# Patient Record
Sex: FEMALE | Race: WHITE
[De-identification: names, ages, dates, MRNs, and addresses within clinical notes are randomized per-mention and may not be internally consistent; named-entity substitution may affect disease eponyms.]

---

## 2020-08-05 ENCOUNTER — HOSPITAL ENCOUNTER (INPATIENT)
Dept: HOSPITAL 61 - 1 WEST ICU | Age: 70
LOS: 14 days | Discharge: HOME HEALTH | DRG: 177 | End: 2020-08-19
Attending: INTERNAL MEDICINE | Admitting: INTERNAL MEDICINE
Payer: COMMERCIAL

## 2020-08-05 VITALS — DIASTOLIC BLOOD PRESSURE: 53 MMHG | SYSTOLIC BLOOD PRESSURE: 90 MMHG

## 2020-08-05 VITALS — DIASTOLIC BLOOD PRESSURE: 51 MMHG | SYSTOLIC BLOOD PRESSURE: 109 MMHG

## 2020-08-05 VITALS — DIASTOLIC BLOOD PRESSURE: 51 MMHG | SYSTOLIC BLOOD PRESSURE: 98 MMHG

## 2020-08-05 VITALS — DIASTOLIC BLOOD PRESSURE: 52 MMHG | SYSTOLIC BLOOD PRESSURE: 100 MMHG

## 2020-08-05 VITALS — DIASTOLIC BLOOD PRESSURE: 56 MMHG | SYSTOLIC BLOOD PRESSURE: 93 MMHG

## 2020-08-05 VITALS — DIASTOLIC BLOOD PRESSURE: 55 MMHG | SYSTOLIC BLOOD PRESSURE: 107 MMHG

## 2020-08-05 VITALS — DIASTOLIC BLOOD PRESSURE: 67 MMHG | SYSTOLIC BLOOD PRESSURE: 93 MMHG

## 2020-08-05 VITALS — SYSTOLIC BLOOD PRESSURE: 102 MMHG | DIASTOLIC BLOOD PRESSURE: 50 MMHG

## 2020-08-05 VITALS — SYSTOLIC BLOOD PRESSURE: 111 MMHG | DIASTOLIC BLOOD PRESSURE: 59 MMHG

## 2020-08-05 VITALS — DIASTOLIC BLOOD PRESSURE: 54 MMHG | SYSTOLIC BLOOD PRESSURE: 100 MMHG

## 2020-08-05 VITALS — DIASTOLIC BLOOD PRESSURE: 56 MMHG | SYSTOLIC BLOOD PRESSURE: 107 MMHG

## 2020-08-05 VITALS — DIASTOLIC BLOOD PRESSURE: 57 MMHG | SYSTOLIC BLOOD PRESSURE: 109 MMHG

## 2020-08-05 VITALS — SYSTOLIC BLOOD PRESSURE: 84 MMHG | DIASTOLIC BLOOD PRESSURE: 56 MMHG

## 2020-08-05 VITALS — DIASTOLIC BLOOD PRESSURE: 54 MMHG | SYSTOLIC BLOOD PRESSURE: 106 MMHG

## 2020-08-05 VITALS — SYSTOLIC BLOOD PRESSURE: 106 MMHG | DIASTOLIC BLOOD PRESSURE: 50 MMHG

## 2020-08-05 VITALS — DIASTOLIC BLOOD PRESSURE: 57 MMHG | SYSTOLIC BLOOD PRESSURE: 114 MMHG

## 2020-08-05 VITALS — DIASTOLIC BLOOD PRESSURE: 52 MMHG | SYSTOLIC BLOOD PRESSURE: 103 MMHG

## 2020-08-05 VITALS — WEIGHT: 199.74 LBS | BODY MASS INDEX: 32.1 KG/M2 | HEIGHT: 66 IN

## 2020-08-05 VITALS — DIASTOLIC BLOOD PRESSURE: 47 MMHG | SYSTOLIC BLOOD PRESSURE: 97 MMHG

## 2020-08-05 VITALS — DIASTOLIC BLOOD PRESSURE: 58 MMHG | SYSTOLIC BLOOD PRESSURE: 111 MMHG

## 2020-08-05 DIAGNOSIS — U07.1: Primary | ICD-10-CM

## 2020-08-05 DIAGNOSIS — Z87.891: ICD-10-CM

## 2020-08-05 DIAGNOSIS — E66.9: ICD-10-CM

## 2020-08-05 DIAGNOSIS — E87.6: ICD-10-CM

## 2020-08-05 DIAGNOSIS — Z87.11: ICD-10-CM

## 2020-08-05 DIAGNOSIS — E43: ICD-10-CM

## 2020-08-05 DIAGNOSIS — J96.01: ICD-10-CM

## 2020-08-05 DIAGNOSIS — J12.89: ICD-10-CM

## 2020-08-05 DIAGNOSIS — B37.0: ICD-10-CM

## 2020-08-05 DIAGNOSIS — Z98.51: ICD-10-CM

## 2020-08-05 LAB
ALBUMIN SERPL-MCNC: 2.7 G/DL (ref 3.4–5)
ALBUMIN/GLOB SERPL: 0.7 {RATIO} (ref 1–1.7)
ALP SERPL-CCNC: 50 U/L (ref 46–116)
ALT SERPL-CCNC: 44 U/L (ref 14–59)
ANION GAP SERPL CALC-SCNC: 7 MMOL/L (ref 6–14)
AST SERPL-CCNC: 63 U/L (ref 15–37)
BASE EXCESS ABG: -3 MMOL/L (ref -3–3)
BASOPHILS # BLD AUTO: 0 X10^3/UL (ref 0–0.2)
BASOPHILS NFR BLD: 0 % (ref 0–3)
BILIRUB SERPL-MCNC: 0.3 MG/DL (ref 0.2–1)
BUN SERPL-MCNC: 21 MG/DL (ref 7–20)
BUN/CREAT SERPL: 23 (ref 6–20)
CALCIUM SERPL-MCNC: 8 MG/DL (ref 8.5–10.1)
CHLORIDE SERPL-SCNC: 108 MMOL/L (ref 98–107)
CO2 SERPL-SCNC: 27 MMOL/L (ref 21–32)
CREAT SERPL-MCNC: 0.9 MG/DL (ref 0.6–1)
EOSINOPHIL NFR BLD: 0 % (ref 0–3)
EOSINOPHIL NFR BLD: 0 X10^3/UL (ref 0–0.7)
ERYTHROCYTE [DISTWIDTH] IN BLOOD BY AUTOMATED COUNT: 13.9 % (ref 11.5–14.5)
GFR SERPLBLD BASED ON 1.73 SQ M-ARVRAT: 62.1 ML/MIN
GLOBULIN SER-MCNC: 3.7 G/DL (ref 2.2–3.8)
GLUCOSE SERPL-MCNC: 143 MG/DL (ref 70–99)
HCO3 BLDA-SCNC: 21 MMOL/L (ref 21–28)
HCT VFR BLD CALC: 38.5 % (ref 36–47)
HGB BLD-MCNC: 13 G/DL (ref 12–15.5)
LYMPHOCYTES # BLD: 0.6 X10^3/UL (ref 1–4.8)
LYMPHOCYTES NFR BLD AUTO: 14 % (ref 24–48)
MCH RBC QN AUTO: 30 PG (ref 25–35)
MCHC RBC AUTO-ENTMCNC: 34 G/DL (ref 31–37)
MCV RBC AUTO: 89 FL (ref 79–100)
MONO #: 0.3 X10^3/UL (ref 0–1.1)
MONOCYTES NFR BLD: 6 % (ref 0–9)
NEUT #: 3.7 X10^3/UL (ref 1.8–7.7)
NEUTROPHILS NFR BLD AUTO: 81 % (ref 31–73)
PCO2 BLDA: 33 MMHG (ref 35–46)
PLATELET # BLD AUTO: 297 X10^3/UL (ref 140–400)
PO2 BLDA: 64 MMHG (ref 65–108)
POTASSIUM SERPL-SCNC: 3.8 MMOL/L (ref 3.5–5.1)
PROT SERPL-MCNC: 6.4 G/DL (ref 6.4–8.2)
PROTHROMBIN TIME: 13.9 SEC (ref 11.7–14)
RBC # BLD AUTO: 4.34 X10^6/UL (ref 3.5–5.4)
SAO2 % BLDA: 92 % (ref 92–99)
SODIUM SERPL-SCNC: 142 MMOL/L (ref 136–145)
WBC # BLD AUTO: 4.6 X10^3/UL (ref 4–11)

## 2020-08-05 PROCEDURE — 86927 PLASMA FRESH FROZEN: CPT

## 2020-08-05 PROCEDURE — G0238 OTH RESP PROC, INDIV: HCPCS

## 2020-08-05 PROCEDURE — 99285 EMERGENCY DEPT VISIT HI MDM: CPT

## 2020-08-05 PROCEDURE — 81001 URINALYSIS AUTO W/SCOPE: CPT

## 2020-08-05 PROCEDURE — 85610 PROTHROMBIN TIME: CPT

## 2020-08-05 PROCEDURE — 94618 PULMONARY STRESS TESTING: CPT

## 2020-08-05 PROCEDURE — 86900 BLOOD TYPING SEROLOGIC ABO: CPT

## 2020-08-05 PROCEDURE — 83880 ASSAY OF NATRIURETIC PEPTIDE: CPT

## 2020-08-05 PROCEDURE — 80048 BASIC METABOLIC PNL TOTAL CA: CPT

## 2020-08-05 PROCEDURE — 36415 COLL VENOUS BLD VENIPUNCTURE: CPT

## 2020-08-05 PROCEDURE — 86850 RBC ANTIBODY SCREEN: CPT

## 2020-08-05 PROCEDURE — 82805 BLOOD GASES W/O2 SATURATION: CPT

## 2020-08-05 PROCEDURE — 86901 BLOOD TYPING SEROLOGIC RH(D): CPT

## 2020-08-05 PROCEDURE — 94760 N-INVAS EAR/PLS OXIMETRY 1: CPT

## 2020-08-05 PROCEDURE — XW13325 TRANSFUSION OF CONVALESCENT PLASMA (NONAUTOLOGOUS) INTO PERIPHERAL VEIN, PERCUTANEOUS APPROACH, NEW TECHNOLOGY GROUP 5: ICD-10-PCS | Performed by: INTERNAL MEDICINE

## 2020-08-05 PROCEDURE — 85379 FIBRIN DEGRADATION QUANT: CPT

## 2020-08-05 PROCEDURE — G0378 HOSPITAL OBSERVATION PER HR: HCPCS

## 2020-08-05 PROCEDURE — 85007 BL SMEAR W/DIFF WBC COUNT: CPT

## 2020-08-05 PROCEDURE — 87106 FUNGI IDENTIFICATION YEAST: CPT

## 2020-08-05 PROCEDURE — P9017 PLASMA 1 DONOR FRZ W/IN 8 HR: HCPCS

## 2020-08-05 PROCEDURE — 87040 BLOOD CULTURE FOR BACTERIA: CPT

## 2020-08-05 PROCEDURE — 94660 CPAP INITIATION&MGMT: CPT

## 2020-08-05 PROCEDURE — 85025 COMPLETE CBC W/AUTO DIFF WBC: CPT

## 2020-08-05 PROCEDURE — 71045 X-RAY EXAM CHEST 1 VIEW: CPT

## 2020-08-05 PROCEDURE — 87086 URINE CULTURE/COLONY COUNT: CPT

## 2020-08-05 PROCEDURE — 36600 WITHDRAWAL OF ARTERIAL BLOOD: CPT

## 2020-08-05 PROCEDURE — 80053 COMPREHEN METABOLIC PANEL: CPT

## 2020-08-05 PROCEDURE — XW033E5 INTRODUCTION OF REMDESIVIR ANTI-INFECTIVE INTO PERIPHERAL VEIN, PERCUTANEOUS APPROACH, NEW TECHNOLOGY GROUP 5: ICD-10-PCS | Performed by: INTERNAL MEDICINE

## 2020-08-05 RX ADMIN — METHYLPREDNISOLONE SODIUM SUCCINATE SCH MG: 40 INJECTION, POWDER, FOR SOLUTION INTRAMUSCULAR; INTRAVENOUS at 14:00

## 2020-08-05 RX ADMIN — WATER PRN ML: 1 INJECTION INTRAVENOUS at 16:44

## 2020-08-05 RX ADMIN — METHYLPREDNISOLONE SODIUM SUCCINATE SCH MG: 40 INJECTION, POWDER, FOR SOLUTION INTRAMUSCULAR; INTRAVENOUS at 12:12

## 2020-08-05 RX ADMIN — PIPERACILLIN SODIUM AND TAZOBACTAM SODIUM SCH MLS/HR: 3; .375 INJECTION, POWDER, LYOPHILIZED, FOR SOLUTION INTRAVENOUS at 18:00

## 2020-08-05 RX ADMIN — METHYLPREDNISOLONE SODIUM SUCCINATE SCH MG: 40 INJECTION, POWDER, FOR SOLUTION INTRAMUSCULAR; INTRAVENOUS at 21:51

## 2020-08-05 RX ADMIN — ZINC SULFATE CAP 220 MG (50 MG ELEMENTAL ZN) SCH MG: 220 (50 ZN) CAP at 12:12

## 2020-08-05 RX ADMIN — ENOXAPARIN SODIUM SCH MG: 40 INJECTION SUBCUTANEOUS at 21:51

## 2020-08-05 RX ADMIN — PIPERACILLIN SODIUM AND TAZOBACTAM SODIUM SCH MLS/HR: 3; .375 INJECTION, POWDER, LYOPHILIZED, FOR SOLUTION INTRAVENOUS at 12:12

## 2020-08-05 RX ADMIN — Medication SCH MLS/HR: at 13:03

## 2020-08-05 RX ADMIN — ENOXAPARIN SODIUM SCH MG: 40 INJECTION SUBCUTANEOUS at 12:12

## 2020-08-05 NOTE — CONS
DATE OF CONSULTATION:  08/05/2020



PULMONARY CONSULTATION



REASON FOR CONSULTATION:  Hypoxic respiratory failure, COVID pneumonia.



HISTORY OF PRESENT ILLNESS:  The patient is a 70-year-old female who works at

[employer] [facility] [employer] with no known past medical history.  She was admitted

at [facility] for worsening hypoxia after she was diagnosed with

COVID-19 pneumonia.  The patient was notified about the results on 07/31/2020

and was asked to be home quarantine for 14 days.  However, the last 5 days

before hospitalization at [facility], she had become

progressively weak, had fever and had chills as well.  The patient was placed on

a nonrebreather mask and she was hypoxic.  Her chest x-ray has shown diffuse

bilateral infiltrates.  Since [facility] ran out of resources and she

was waiting in the ER on a nonrebreather mask, they requested to accept her as a

transfer and Dr. Cope who is the hospitalist accepted her as a transfer.  At

present, she is comfortable.  She was transferred on nonrebreather mask.  I have

placed her on Vapotherm at 40 liters flow and 100% FiO2.  X-rays have been

ordered.  I have reviewed labs from outside facility.  Her lactic acid was 1.9. 

AST was elevated at 103.  Her chest x-ray reports were reviewed as well.



PAST MEDICAL HISTORY:  No chronic medical problem.



PAST SURGICAL HISTORY:  Perforated peptic ulcer surgery in 2006, history of

tubal ligation surgery.



FAMILY HISTORY:  Dyslipidemia.



SOCIAL HISTORY:  She no longer smokes.  She had minimal tobacco history.



ALLERGIES:  None.



CURRENT MEDICATIONS:  Reviewed as listed in the MRAD including Lovenox and IV

steroids.



REVIEW OF SYSTEMS:  Pertinent positive discussed in my present illness,

otherwise noncontributory.  A 12-point system review was obtained.



PHYSICAL EXAMINATION:  On examination, which was done visual due to COVID

pandemia.  Blood pressure stable, pulse ox 95% on current Vapotherm at 40 liters

and 100% FiO2.  She is in no obvious respiratory distress.  She is comfortable. 

Trace edema on the legs.



IMAGING:  Chest x-ray has been ordered by the x-rays from outside facility shows

bilateral patchy infiltrates.



LABORATORY DATA:  Labs from [facility] have been reviewed.



IMPRESSION:

1.  Acute hypoxic respiratory failure secondary to COVID-19 pneumonia and acute

lung injury.

2.  No significant tobacco history.

3.  Abnormal chest x-ray consistent with COVID-19 pneumonia.

4.  Mildly increased liver function tests.



RECOMMENDATIONS:

1.  Continue present Vapotherm at 40 liters flow and 100% FiO2.

2.  Monitor the course of treatment.  If needed, we may use BiPAP and a negative

flow room.

3.  Add empiric antibiotics.

4.  IV steroids since her A-a gradient is widened.

5.  Monitor for inflammatory markers.

6.  High dose DVT prophylaxis.

7.  Consider plasma 

8.  Discussed with Dr. Cope and discussed with RN and RT.  We will follow

along with you.  Discussed with the patient.



Critical care time 37 minutes, which includes review of the chart, labs and

data.

 



______________________________

MAURI GALLAGHER MD



DR:  DAYSI/jah  JOB#:  [id number] / [id number]

DD:  08/05/2020 10:48  DT:  08/05/2020 12:32

LEDY

## 2020-08-05 NOTE — CONS
DATE OF CONSULTATION:  



REQUESTING PHYSICIAN:  John Cope MD.



REASON FOR CONSULTATION:  COVID-19 positive.



HISTORY OF PRESENT ILLNESS:  This is a 70-year-old  female with a

history of peptic ulcer disease.  Other than that, she is essentially healthy,

she says.  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  The patient was diagnosed with

COVID-19 on 07/30/2020.  The patient was sent home and was quarantined, but then

she started having shortness of breath.  She had fever and hypoxia, hence came

in.  The patient came to the [place].  The patient was admitted and the oxygen

requirement went significantly up, hence she was transferred here for further

management.  The patient denies any nausea, vomiting or diarrhea.  Denies any

chest pain, abdominal pain, urinary symptoms or bowel symptoms.  The patient has

been put on steroids and Zosyn.



PAST MEDICAL HISTORY:  Positive for perforated ulcer surgery done in the past,

tubal ligation done.  Otherwise, she is healthy, she says.



SOCIAL HISTORY:  Negative for smoking, alcohol or illicit drug use.



ALLERGIES:  No known drug allergies.



CURRENT MEDICATIONS:  Reviewed.



REVIEW OF SYSTEMS:  As per HPI, all other systems reviewed and are negative.



PHYSICAL EXAMINATION:

GENERAL:  Alert and oriented female, not in distress.

VITAL SIGNS:  Stable, temperature 97.8, pulse 86, respirations 30, blood

pressure 84/56.  The patient is requiring 40% FiO2 with Vapotherm.

HEENT:  NAD.

NECK:  Supple, no JVP, no lymphadenopathy.

LUNGS:  Clear.

HEART:  S1, S2 regular.

ABDOMEN:  Benign.

EXTREMITIES:  No edema or cyanosis.

SKIN:  Unremarkable.

NEUROLOGIC:  The patient is alert, awake and appropriate.  No focal neurologic

deficit.



LABORATORY DATA:  Her AST is 103, ALT is 58.  WBC 7.1.  BUN and creatinine is

normal.  Chest x-ray showed bilateral pulmonary infiltrate.



IMPRESSION:

1.  COVID-19 positive.

2.  Bilateral pulmonary infiltrate.

3.  Hypoxemia.

4.  Obesity.



RECOMMENDATIONS:  Continue current management.  I did discuss with the patient

about convalescent plasma and remdesivir.  The patient is in agreement with

that.  We will start with the convalescent plasma and remdesivir.



Thank you very much, Dr. Cope, for giving me the opportunity to participate in

this patient's care.

 



______________________________

LING AYALA MD



DR:  DANA/jah  JOB#:  [id number] / [id number]

DD:  08/05/2020 11:49  DT:  08/05/2020 12:09

## 2020-08-05 NOTE — RAD
EXAM: CHEST AP ONLY

 

INDICATION: Reason: COVID 19 / Spl. Instructions:  / History: .

 

TECHNIQUE: Single view 

 

COMPARISON: None

 

FINDINGS:

 

The heart size is normal.

 

The great vessels appear unremarkable.

 

There is no hilar or mediastinal mass.

 

Lungs show multifocal bilateral airspace opacities, most confluent in the 

right upper lobe.

 

There is no pleural effusion or pneumothorax.

 

There are no significant osseous abnormalities.

 

IMPRESSION:

 

Bilateral pneumonia, more confluent in the right upper lobe.

 

 

Electronically signed by: Kashmir Devine MD (8/5/2020 11:06 AM) 

[id number]

## 2020-08-05 NOTE — PDOC1
History and Physical


Date of Admission


Date of Admission


DATE: 8/5/20 


TIME: 10:09





History of Present Illness


History of Present Illness


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 no known past medical history who is been transferred from [facility] for worsening hypoxia after diagnosis of SARSCOV2. 


On 7/30/2020 she began having symptoms of diarrhea and shortness of breath and 

went to the ED to have COVID-19 testing, she was notified of the results on 

7/31/2020 and asked to quarantine at home for 14 days.  Over the course the next

5 days she became progressively more weak had fever and chills and began having 

myalgias and worsening diarrhea with little bit of abdominal pain.  She returned

to the ED on 8/4/2020 at [facility] and was found to be hypoxic.


ABG on 8/4/2020 7.472/30 5.1/94.3 with HCO3 of 26.2 on nonrebreather 10 L labs 

on 8 4 sodium 135, K3.2, chloride 99, CO2 25, BUN 17, creatinine 1, glucose 115,

lactate 1.9, calcium 8, ferritin 1491, bilirubin 0.4, , ALT 5 8, AL K 

phosphatase 62 troponin negative, .6, total protein 6.3, albumin 2.7 

prealbumin 9, lipase 185, B12 995, procalcitonin 0.40, TSH 1.427, d-dimer 1.16, 

WBC 7.1, Hb 14.1, platelets 302 chest x-ray on 8 4 read as worsening 

interstitial and airspace opacities in the right upper lobe and left lung base 

and worsening interstitial opacities in the right lung base compatible with 

multifocal pneumonia


EKG appears normal sinus rhythm with heart rate approximately 85 bpm with normal

axis some inferior T wave changes that are nonspecific no ST segment changes.


Seen in our ICU transitioning from nonrebreather to Vapotherm with a significant

desaturation into the 70s during transition with quick improvement to 94% on 

Vapotherm.  She is still complaining of weakness and diarrhea to me





Past Medical History


Cardiovascular:  No pertinent hx





Past Surgical History


Past Surgical History


2006 perforated peptic ulcer surgery.  Tubal ligation surgery


Past Surgical History:  Tubal Ligation, Other (Perforated peptic ulcer)





Family History


Family History:  High Cholestrol





Social History


Smoke:  Quit (2006)


ALCOHOL:  none


Drugs:  None





Allergies


Allergies:  


Coded Allergies:  


     No Known Drug Allergies (Unverified , 8/5/20)





ROS


General:  YES: Chills, Fatigue, Malaise; 


   No: Night Sweats, Appetite, Other


PSYCHOLOGICAL ROS:  No: Anxiety, Behavioral Disorder, Concentration difficultie,

Decreased libido, Depression, Disorientation, Hallucinations, Hostility, 

Irritablity, Memory difficulties, Mood Swings, Obsessive thoughts, Physical 

abuse, Sexual abuse, Sleep disturbances, Suicidal ideation, Other


Eyes:  No Blurry vision, No Decreased vision, No Double vision, No Dry eyes, No 

Excessive tearing, No Eye Pain, No Itchy Eyes, No Loss of vision, No 

Photophobia, No Scotomata, No Uses contacts, No Uses glasses, No Other


HEENT:  No: Heacaches, Visual Changes, Hearing change, Nasal congestion, Nasal 

discharge, Oral lesions, Sinus pain, Sore Throat, Epistaxis, Sneezing, Snoring, 

Tinnitus, Vertigo, Vocal changes, Other


ALLERGY AND IMMUNOLOGY:  No: Hives, Insect Bite Sensitivity, Itchy/Watery Eyes, 

Nasal Congestion, Post Nasal Drip, Seasonal Allergies, Other


Hematological and Lymphatic:  YES: Blood Transfusions; 


   No: Bleeding Problems, Blood Clots, Brusing, Night Sweats, Pallor, Swollen 

Lymph Nodes, Other


ENDOCRINE:  No: Breast Changes, Galactorrhea, Hair Pattern Changes, Hot Flashes,

Malaise/lethargy, Mood Swings, Palpitations, Polydipsia/polyuria, Skin Changes, 

Temperature Intolerance, Unexpected Weight Changes, Other


Breast:  No New/Changing Breast Lumps, No Nipple changes, No Nipple discharge, 

No Other


Respiratory:  YES: Cough, Shortness of breath, SOB with excertion, Tachypnea, 

Wheezing; 


   No: Hemoptysis, Orthopnea, Pleuritic Pain, Sputum Changes, Stridor, Other


Cardiovascular:  No Chest Pain, No Palpitations, No Orthopnea, No Paroxysmal 

Noc. Dyspnea, No Edema, No Lt Headedness, No Other


Gastrointestinal:  Yes Nausea, Yes Abdominal Pain, Yes Diarrhea; 


   No Vomiting, No Constipation, No Melena, No Hematochezia, No Other


Genitourinary:  No Dysuria, No Frequency, No Incontinence, No Hematuria, No 

Retention, No Discharge, No Urgency, No Pain, No Flank Pain, No Other, No , No ,

No , No , No , No , No 


Musculoskeletal:  Yes Muscular Weakness; 


   No Gait Disturbance, No Joint Pain, No Joint Stiffness, No Joint Swelling, No

Muscle Pain, No Pain In:, No Swelling In:, No Other


Neurological:  No Behavorial Changes, No Bowel/Bladder ControlChng, No 

Confusion, No Dizziness, No Gait Disturbance, No Headaches, No Impaired 

Coord/balance, No Memory Loss, No Numbness/Tingling, No Seizures, No Speech 

Problems, No Tremors, No Visual Changes, No Weakness, No Other


Skin:  No Dry Skin, No Eczema, No Hair Changes, No Lumps, No Mole Changes, No 

Mottling, No Nail Changes, No Pruritus, No Rash, No Skin Lesion Changes, No 

Other, No Acne





Physical Exam


General:  Alert, Oriented X3, Cooperative, moderate distress


HEENT:  Atraumatic, PERRLA, EOMI, Mucous membr. moist/pink


Lungs:  Other (Bilateral rhonchi)


Heart:  S1S2, RRR, no thrills, no rubs, no gallops, no murmurs


Abdomen:  Normal bowel sounds, Soft, No tenderness, No hepatosplenomegaly, No 

masses


Rectal Exam:  not examined


Extremities:  No clubbing, No cyanosis, No edema, Normal pulses, No 

tenderness/swelling


Skin:  No rashes, No breakdown, No significant lesion


Neuro:  Normal gait, Normal speech, Strength at 5/5 X4 ext, Normal tone, 

Sensation intact, Cranial nerves 3-12 NL, Reflexes 2+


Psych/Mental Status:  Mental status NL, Mood NL





Images


Images


CXR: 8/4/2020 at [facility]





VTE Prophylaxis Ordered


VTE Prophylaxis Devices:  No


VTE Pharmacological Prophylaxi:  Yes





Assessment/Plan


Assessment/Plan


A/P:


Acute hypoxic respiratory failuresecondary to SARSCo.2 pneumonia.  Continue 

supportive therapy, steroids room to severe protocol initiated on 8 4 will 

continue here.  Consult ID and pulmonology


SARS- COV2 pneumonia -continue IV Solu-Medrol wean O2 as tolerated I discussed 

with pulmonology to move to a negative pressure room if her desaturations worsen

and she may need BiPAP.. Will try experimental drug to severe and convalescent 

plasma therapies with ID and pulmonology.


Hypokalemiawill replace, check magnesium level


Diarrhealikely COVID-19 related.  Will check for C. difficile per protocol if 

more than 3 bowel movements in a day


Transaminitis also likely related COVID-19, will monitor


Severe protein calorie malnutritionlikely related to above





FEN - General diet


PPX - lovenox


FULL CODE


Dispo - ICU for above


CC time 38 minutes





Justicifation of Admission Dx:


Justifications for Admission:


Justification of Admission Dx:  Yes


Respiratory Failure:  Severe Resp Distress











BLAIR ERICKSON MD         Aug 5, 2020 10:18

## 2020-08-05 NOTE — NUR
Pt arrived to  112 via EMS from [place] for covid +. Pt placed on bed and placed on ICU 
monitors. VSS. Vapotherm applied at 40L/100%. Pt does not seem to be in any distress. Dr. Wilson and Dr. Cope at bedside placing orders. Pt received Solumedrol and Zosyn. Pt 
consented to receiving Covid plasma. All questions answered. Call light at bedside for pt.

## 2020-08-06 VITALS — SYSTOLIC BLOOD PRESSURE: 107 MMHG | DIASTOLIC BLOOD PRESSURE: 44 MMHG

## 2020-08-06 VITALS — SYSTOLIC BLOOD PRESSURE: 119 MMHG | DIASTOLIC BLOOD PRESSURE: 61 MMHG

## 2020-08-06 VITALS — DIASTOLIC BLOOD PRESSURE: 55 MMHG | SYSTOLIC BLOOD PRESSURE: 101 MMHG

## 2020-08-06 VITALS — DIASTOLIC BLOOD PRESSURE: 60 MMHG | SYSTOLIC BLOOD PRESSURE: 117 MMHG

## 2020-08-06 VITALS — DIASTOLIC BLOOD PRESSURE: 50 MMHG | SYSTOLIC BLOOD PRESSURE: 108 MMHG

## 2020-08-06 VITALS — DIASTOLIC BLOOD PRESSURE: 42 MMHG | SYSTOLIC BLOOD PRESSURE: 97 MMHG

## 2020-08-06 VITALS — SYSTOLIC BLOOD PRESSURE: 112 MMHG | DIASTOLIC BLOOD PRESSURE: 56 MMHG

## 2020-08-06 VITALS — DIASTOLIC BLOOD PRESSURE: 50 MMHG | SYSTOLIC BLOOD PRESSURE: 105 MMHG

## 2020-08-06 VITALS — DIASTOLIC BLOOD PRESSURE: 55 MMHG | SYSTOLIC BLOOD PRESSURE: 113 MMHG

## 2020-08-06 VITALS — DIASTOLIC BLOOD PRESSURE: 47 MMHG | SYSTOLIC BLOOD PRESSURE: 110 MMHG

## 2020-08-06 VITALS — SYSTOLIC BLOOD PRESSURE: 116 MMHG | DIASTOLIC BLOOD PRESSURE: 58 MMHG

## 2020-08-06 VITALS — DIASTOLIC BLOOD PRESSURE: 67 MMHG | SYSTOLIC BLOOD PRESSURE: 94 MMHG

## 2020-08-06 VITALS — DIASTOLIC BLOOD PRESSURE: 52 MMHG | SYSTOLIC BLOOD PRESSURE: 110 MMHG

## 2020-08-06 VITALS — DIASTOLIC BLOOD PRESSURE: 47 MMHG | SYSTOLIC BLOOD PRESSURE: 113 MMHG

## 2020-08-06 VITALS — SYSTOLIC BLOOD PRESSURE: 109 MMHG | DIASTOLIC BLOOD PRESSURE: 49 MMHG

## 2020-08-06 VITALS — DIASTOLIC BLOOD PRESSURE: 60 MMHG | SYSTOLIC BLOOD PRESSURE: 129 MMHG

## 2020-08-06 VITALS — SYSTOLIC BLOOD PRESSURE: 110 MMHG | DIASTOLIC BLOOD PRESSURE: 49 MMHG

## 2020-08-06 VITALS — SYSTOLIC BLOOD PRESSURE: 107 MMHG | DIASTOLIC BLOOD PRESSURE: 56 MMHG

## 2020-08-06 VITALS — SYSTOLIC BLOOD PRESSURE: 109 MMHG | DIASTOLIC BLOOD PRESSURE: 52 MMHG

## 2020-08-06 VITALS — DIASTOLIC BLOOD PRESSURE: 60 MMHG | SYSTOLIC BLOOD PRESSURE: 119 MMHG

## 2020-08-06 VITALS — DIASTOLIC BLOOD PRESSURE: 49 MMHG | SYSTOLIC BLOOD PRESSURE: 95 MMHG

## 2020-08-06 VITALS — SYSTOLIC BLOOD PRESSURE: 112 MMHG | DIASTOLIC BLOOD PRESSURE: 59 MMHG

## 2020-08-06 VITALS — DIASTOLIC BLOOD PRESSURE: 51 MMHG | SYSTOLIC BLOOD PRESSURE: 103 MMHG

## 2020-08-06 LAB
BASE EXCESS STD BLDA CALC-SCNC: 0 MMOL/L (ref -3–3)
BASOPHILS # BLD AUTO: 0 X10^3/UL (ref 0–0.2)
BASOPHILS NFR BLD: 0 % (ref 0–3)
EOSINOPHIL NFR BLD: 0 % (ref 0–3)
EOSINOPHIL NFR BLD: 0 X10^3/UL (ref 0–0.7)
ERYTHROCYTE [DISTWIDTH] IN BLOOD BY AUTOMATED COUNT: 14.3 % (ref 11.5–14.5)
HCO3 BLDA-SCNC: 25 MMOL/L (ref 21–28)
HCT VFR BLD CALC: 37.9 % (ref 36–47)
HGB BLD-MCNC: 12.7 G/DL (ref 12–15.5)
LYMPHOCYTES # BLD: 0.7 X10^3/UL (ref 1–4.8)
LYMPHOCYTES NFR BLD AUTO: 8 % (ref 24–48)
MCH RBC QN AUTO: 30 PG (ref 25–35)
MCHC RBC AUTO-ENTMCNC: 34 G/DL (ref 31–37)
MCV RBC AUTO: 89 FL (ref 79–100)
METHGB MFR BLD: 0.3 % (ref 0–1.9)
MONO #: 0.5 X10^3/UL (ref 0–1.1)
MONOCYTES NFR BLD: 5 % (ref 0–9)
NEUT #: 7.5 X10^3/UL (ref 1.8–7.7)
NEUTROPHILS NFR BLD AUTO: 87 % (ref 31–73)
OXYHGB MFR BLD: 86.9 %
PCO2 BLDA: 40 MMHG (ref 35–46)
PLATELET # BLD AUTO: 355 X10^3/UL (ref 140–400)
PO2 BLDA: 53 MMHG (ref 65–108)
RBC # BLD AUTO: 4.27 X10^6/UL (ref 3.5–5.4)
SAO2 % BLDA: 87 % (ref 92–99)
WBC # BLD AUTO: 8.6 X10^3/UL (ref 4–11)

## 2020-08-06 RX ADMIN — METHYLPREDNISOLONE SODIUM SUCCINATE SCH MG: 40 INJECTION, POWDER, FOR SOLUTION INTRAMUSCULAR; INTRAVENOUS at 05:50

## 2020-08-06 RX ADMIN — Medication SCH CAP: at 20:29

## 2020-08-06 RX ADMIN — METHYLPREDNISOLONE SODIUM SUCCINATE SCH MG: 40 INJECTION, POWDER, FOR SOLUTION INTRAMUSCULAR; INTRAVENOUS at 14:42

## 2020-08-06 RX ADMIN — ENOXAPARIN SODIUM SCH MG: 40 INJECTION SUBCUTANEOUS at 09:28

## 2020-08-06 RX ADMIN — Medication SCH MLS/HR: at 13:27

## 2020-08-06 RX ADMIN — ZINC SULFATE CAP 220 MG (50 MG ELEMENTAL ZN) SCH MG: 220 (50 ZN) CAP at 09:28

## 2020-08-06 RX ADMIN — ENOXAPARIN SODIUM SCH MG: 40 INJECTION SUBCUTANEOUS at 20:31

## 2020-08-06 RX ADMIN — PIPERACILLIN SODIUM AND TAZOBACTAM SODIUM SCH MLS/HR: 3; .375 INJECTION, POWDER, LYOPHILIZED, FOR SOLUTION INTRAVENOUS at 05:50

## 2020-08-06 RX ADMIN — PIPERACILLIN SODIUM AND TAZOBACTAM SODIUM SCH MLS/HR: 3; .375 INJECTION, POWDER, LYOPHILIZED, FOR SOLUTION INTRAVENOUS at 17:33

## 2020-08-06 RX ADMIN — WATER PRN ML: 1 INJECTION INTRAVENOUS at 16:11

## 2020-08-06 RX ADMIN — METHYLPREDNISOLONE SODIUM SUCCINATE SCH MG: 40 INJECTION, POWDER, FOR SOLUTION INTRAMUSCULAR; INTRAVENOUS at 21:39

## 2020-08-06 RX ADMIN — PIPERACILLIN SODIUM AND TAZOBACTAM SODIUM SCH MLS/HR: 3; .375 INJECTION, POWDER, LYOPHILIZED, FOR SOLUTION INTRAVENOUS at 00:02

## 2020-08-06 RX ADMIN — PIPERACILLIN SODIUM AND TAZOBACTAM SODIUM SCH MLS/HR: 3; .375 INJECTION, POWDER, LYOPHILIZED, FOR SOLUTION INTRAVENOUS at 12:25

## 2020-08-06 RX ADMIN — WATER PRN ML: 1 INJECTION INTRAVENOUS at 04:47

## 2020-08-06 NOTE — NUR
SS following for discharge planning. SS reviewed pt chart and discussed with pt RN. Pt is 
from home with spouse and is currently on Vapotherm. COVID19 positive. Pt on IV Zosyn and 
Remdesivir. SS will continue to follow for discharge planning.

## 2020-08-06 NOTE — PDOC
PULMONARY PROGRESS NOTES


DATE: 8/6/20 


TIME: 09:26


Subjective


reports SOB at rest, non-productive cough 


nursing reports oxygen desaturation with minimal activity


Vitals





Vital Signs








  Date Time  Temp Pulse Resp B/P (MAP) Pulse Ox O2 Delivery O2 Flow Rate FiO2


 


8/6/20 07:48     96 Vapotherm 40.0 


 


8/6/20 07:00  50 30 101/55 (70)    


 


8/6/20 04:00 98.3       





 98.3       








Comments


Visual exam preformed pt. seen during COVID 19 Pandemic


Vapotherm 100% 


RRR, no edema 


No rash


ROS:  No Nausea, No Chest Pain, No Abdominal Pain, No Increase Cough


Labs





Laboratory Tests








Test


 8/5/20


11:10 8/5/20


11:25 8/6/20


05:30 8/6/20


07:55


 


White Blood Count


 4.6 x10^3/uL


(4.0-11.0) 


 8.6 x10^3/uL


(4.0-11.0) 





 


Red Blood Count


 4.34 x10^6/uL


(3.50-5.40) 


 4.27 x10^6/uL


(3.50-5.40) 





 


Hemoglobin


 13.0 g/dL


(12.0-15.5) 


 12.7 g/dL


(12.0-15.5) 





 


Hematocrit


 38.5 %


(36.0-47.0) 


 37.9 %


(36.0-47.0) 





 


Mean Corpuscular Volume 89 fL ()   89 fL ()  


 


Mean Corpuscular Hemoglobin 30 pg (25-35)   30 pg (25-35)  


 


Mean Corpuscular Hemoglobin


Concent 34 g/dL


(31-37) 


 34 g/dL


(31-37) 





 


Red Cell Distribution Width


 13.9 %


(11.5-14.5) 


 14.3 %


(11.5-14.5) 





 


Platelet Count


 297 x10^3/uL


(140-400) 


 355 x10^3/uL


(140-400) 





 


Neutrophils (%) (Auto) 81 % (31-73)   87 % (31-73)  


 


Lymphocytes (%) (Auto) 14 % (24-48)   8 % (24-48)  


 


Monocytes (%) (Auto) 6 % (0-9)   5 % (0-9)  


 


Eosinophils (%) (Auto) 0 % (0-3)   0 % (0-3)  


 


Basophils (%) (Auto) 0 % (0-3)   0 % (0-3)  


 


Neutrophils # (Auto)


 3.7 x10^3/uL


(1.8-7.7) 


 7.5 x10^3/uL


(1.8-7.7) 





 


Lymphocytes # (Auto)


 0.6 x10^3/uL


(1.0-4.8) 


 0.7 x10^3/uL


(1.0-4.8) 





 


Monocytes # (Auto)


 0.3 x10^3/uL


(0.0-1.1) 


 0.5 x10^3/uL


(0.0-1.1) 





 


Eosinophils # (Auto)


 0.0 x10^3/uL


(0.0-0.7) 


 0.0 x10^3/uL


(0.0-0.7) 





 


Basophils # (Auto)


 0.0 x10^3/uL


(0.0-0.2) 


 0.0 x10^3/uL


(0.0-0.2) 





 


Prothrombin Time


 13.9 SEC


(11.7-14.0) 


 


 





 


Prothromb Time International


Ratio 1.1 (0.8-1.1) 


 


 


 





 


Sodium Level


 142 mmol/L


(136-145) 


 


 





 


Potassium Level


 3.8 mmol/L


(3.5-5.1) 


 


 





 


Chloride Level


 108 mmol/L


() 


 


 





 


Carbon Dioxide Level


 27 mmol/L


(21-32) 


 


 





 


Anion Gap 7 (6-14)    


 


Blood Urea Nitrogen


 21 mg/dL


(7-20) 


 


 





 


Creatinine


 0.9 mg/dL


(0.6-1.0) 


 


 





 


Estimated GFR


(Cockcroft-Gault) 62.1 


 


 


 





 


BUN/Creatinine Ratio 23 (6-20)    


 


Glucose Level


 143 mg/dL


(70-99) 


 


 





 


Calcium Level


 8.0 mg/dL


(8.5-10.1) 


 


 





 


Total Bilirubin


 0.3 mg/dL


(0.2-1.0) 


 


 





 


Aspartate Amino Transf


(AST/SGOT) 63 U/L (15-37) 


 


 


 





 


Alanine Aminotransferase


(ALT/SGPT) 44 U/L (14-59) 


 


 


 





 


Alkaline Phosphatase


 50 U/L


() 


 


 





 


NT-Pro-B-Type Natriuretic


Peptide 968 pg/mL


(0-124) 


 


 





 


Total Protein


 6.4 g/dL


(6.4-8.2) 


 


 





 


Albumin


 2.7 g/dL


(3.4-5.0) 


 


 





 


Albumin/Globulin Ratio 0.7 (1.0-1.7)    


 


O2 Saturation  92 % (92-99)   87 % (92-99) 


 


Arterial Blood pH


 


 7.42


(7.35-7.45) 


 7.41


(7.35-7.45)


 


Arterial Blood pCO2 at


Patient Temp 


 33 mmHg


(35-46) 


 40 mmHg


(35-46)


 


Arterial Blood pO2 at Patient


Temp 


 64 mmHg


() 


 53 mmHg


()


 


Arterial Blood HCO3


 


 21 mmol/L


(21-28) 


 25 mmol/L


(21-28)


 


Arterial Blood Base Excess


 


 -3 mmol/L


(-3-3) 


 0 mmol/L


(-3-3)


 


FiO2  100% vapotherm   100% 


 


Oxyhemoglobin    86.9 % 


 


Methemoglobin


 


 


 


 0.3 %


(0.0-1.9)


 


Carbon Monoxide, Quantitative


 


 


 


 0.3 %


(0.0-1.9)








Laboratory Tests








Test


 8/5/20


11:10 8/5/20


11:25 8/6/20


05:30 8/6/20


07:55


 


White Blood Count


 4.6 x10^3/uL


(4.0-11.0) 


 8.6 x10^3/uL


(4.0-11.0) 





 


Red Blood Count


 4.34 x10^6/uL


(3.50-5.40) 


 4.27 x10^6/uL


(3.50-5.40) 





 


Hemoglobin


 13.0 g/dL


(12.0-15.5) 


 12.7 g/dL


(12.0-15.5) 





 


Hematocrit


 38.5 %


(36.0-47.0) 


 37.9 %


(36.0-47.0) 





 


Mean Corpuscular Volume 89 fL ()   89 fL ()  


 


Mean Corpuscular Hemoglobin 30 pg (25-35)   30 pg (25-35)  


 


Mean Corpuscular Hemoglobin


Concent 34 g/dL


(31-37) 


 34 g/dL


(31-37) 





 


Red Cell Distribution Width


 13.9 %


(11.5-14.5) 


 14.3 %


(11.5-14.5) 





 


Platelet Count


 297 x10^3/uL


(140-400) 


 355 x10^3/uL


(140-400) 





 


Neutrophils (%) (Auto) 81 % (31-73)   87 % (31-73)  


 


Lymphocytes (%) (Auto) 14 % (24-48)   8 % (24-48)  


 


Monocytes (%) (Auto) 6 % (0-9)   5 % (0-9)  


 


Eosinophils (%) (Auto) 0 % (0-3)   0 % (0-3)  


 


Basophils (%) (Auto) 0 % (0-3)   0 % (0-3)  


 


Neutrophils # (Auto)


 3.7 x10^3/uL


(1.8-7.7) 


 7.5 x10^3/uL


(1.8-7.7) 





 


Lymphocytes # (Auto)


 0.6 x10^3/uL


(1.0-4.8) 


 0.7 x10^3/uL


(1.0-4.8) 





 


Monocytes # (Auto)


 0.3 x10^3/uL


(0.0-1.1) 


 0.5 x10^3/uL


(0.0-1.1) 





 


Eosinophils # (Auto)


 0.0 x10^3/uL


(0.0-0.7) 


 0.0 x10^3/uL


(0.0-0.7) 





 


Basophils # (Auto)


 0.0 x10^3/uL


(0.0-0.2) 


 0.0 x10^3/uL


(0.0-0.2) 





 


Prothrombin Time


 13.9 SEC


(11.7-14.0) 


 


 





 


Prothromb Time International


Ratio 1.1 (0.8-1.1) 


 


 


 





 


Sodium Level


 142 mmol/L


(136-145) 


 


 





 


Potassium Level


 3.8 mmol/L


(3.5-5.1) 


 


 





 


Chloride Level


 108 mmol/L


() 


 


 





 


Carbon Dioxide Level


 27 mmol/L


(21-32) 


 


 





 


Anion Gap 7 (6-14)    


 


Blood Urea Nitrogen


 21 mg/dL


(7-20) 


 


 





 


Creatinine


 0.9 mg/dL


(0.6-1.0) 


 


 





 


Estimated GFR


(Cockcroft-Gault) 62.1 


 


 


 





 


BUN/Creatinine Ratio 23 (6-20)    


 


Glucose Level


 143 mg/dL


(70-99) 


 


 





 


Calcium Level


 8.0 mg/dL


(8.5-10.1) 


 


 





 


Total Bilirubin


 0.3 mg/dL


(0.2-1.0) 


 


 





 


Aspartate Amino Transf


(AST/SGOT) 63 U/L (15-37) 


 


 


 





 


Alanine Aminotransferase


(ALT/SGPT) 44 U/L (14-59) 


 


 


 





 


Alkaline Phosphatase


 50 U/L


() 


 


 





 


NT-Pro-B-Type Natriuretic


Peptide 968 pg/mL


(0-124) 


 


 





 


Total Protein


 6.4 g/dL


(6.4-8.2) 


 


 





 


Albumin


 2.7 g/dL


(3.4-5.0) 


 


 





 


Albumin/Globulin Ratio 0.7 (1.0-1.7)    


 


O2 Saturation  92 % (92-99)   87 % (92-99) 


 


Arterial Blood pH


 


 7.42


(7.35-7.45) 


 7.41


(7.35-7.45)


 


Arterial Blood pCO2 at


Patient Temp 


 33 mmHg


(35-46) 


 40 mmHg


(35-46)


 


Arterial Blood pO2 at Patient


Temp 


 64 mmHg


() 


 53 mmHg


()


 


Arterial Blood HCO3


 


 21 mmol/L


(21-28) 


 25 mmol/L


(21-28)


 


Arterial Blood Base Excess


 


 -3 mmol/L


(-3-3) 


 0 mmol/L


(-3-3)


 


FiO2  100% vapotherm   100% 


 


Oxyhemoglobin    86.9 % 


 


Methemoglobin


 


 


 


 0.3 %


(0.0-1.9)


 


Carbon Monoxide, Quantitative


 


 


 


 0.3 %


(0.0-1.9)








Comments


CXR


 


IMPRESSION:


 


Bilateral pneumonia, more confluent in the right upper lobe.





Impression


.


IMPRESSION:


1.  Acute hypoxic respiratory failure secondary to COVID-19 pneumonia and acute


lung injury.


2.  No significant tobacco history.


3.  Abnormal chest x-ray consistent with COVID-19 pneumonia.


4.  Mildly increased liver function tests.





Plan


.


RECOMMENDATIONS:


1.  Continue present Vapotherm at 40 liters flow and 100% FiO2.


2.  Monitor the course of treatment.  If needed, we may use BiPAP and a negative

flow room.


3.  Cont. antibiotics.


4. cont. IV steroids 


5.  Monitor for inflammatory markers, will check DDimer today 


6.  High dose DVT prophylaxis.


7.  S/P convalescent plasma on 8/5/2020 and remdesivir.


8.  Discussed with RN and RT.  





Critical care time 37 minutes, which includes review of the chart, labs and


data.











MAURI GALLAGHER MD                  Aug 6, 2020 09:31

## 2020-08-06 NOTE — PDOC
[employer] PROGRESS NOTE


Date of Service


DOS:


DATE: 8/6/20 


TIME: 07:54





Chief Complaint


Chief Complaint


A/P:


Acute hypoxic respiratory failuresecondary to SARSCo.2 pneumonia.  Continue 

supportive therapy, steroids room to severe protocol initiated on 8 4 will 

continue here.  Consult ID and pulmonology


SARS- COV2 pneumonia -continue IV Solu-Medrol wean O2 as tolerated I discussed 

with pulmonology to move to a negative pressure room if her desaturations worsen

and she may need BiPAP.. Will try experimental drug to severe and convalescent 

plasma therapies with ID and pulmonology.


Hypokalemiawill replace, check magnesium level


Diarrhealikely COVID-19 related.  Will check for C. difficile per protocol if 

more than 3 bowel movements in a day


Transaminitis also likely related COVID-19, will monitor


Severe protein calorie malnutritionlikely related to above





FEN - General diet


PPX - lovenox


FULL CODE


Dispo - ICU for above


CC time 38 minutes





History of Present Illness


History of Present Illness


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 


On 7/30/2020 she began having symptoms of diarrhea and shortness of breath and 

went to the ED to have COVID-19 testing, she was notified of the results on 

7/31/2020 and asked to quarantine at home for 14 days.  Over the course the next

5 days she became progressively more weak had fever and chills and began having 

myalgias and worsening diarrhea with little bit of abdominal pain.  She returned

to the ED on 8/4/2020 at [facility] and was found to be hypoxic.


ABG on 8/4/2020 7.472/30 5.1/94.3 with HCO3 of 26.2 on nonrebreather 10 L labs 

on 8 4 sodium 135, K3.2, chloride 99, CO2 25, BUN 17, creatinine 1, glucose 115,

lactate 1.9, calcium 8, ferritin 1491, bilirubin 0.4, , ALT 5 8, AL K 

phosphatase 62 troponin negative, .6, total protein 6.3, albumin 2.7 

prealbumin 9, lipase 185, B12 995, procalcitonin 0.40, TSH 1.427, d-dimer 1.16, 

WBC 7.1, Hb 14.1, platelets 302 chest x-ray on 8 4 read as worsening 

interstitial and airspace opacities in the right upper lobe and left lung base 

and worsening interstitial opacities in the right lung base compatible with 

multifocal pneumonia


EKG appears normal sinus rhythm with heart rate approximately 85 bpm with normal

axis some inferior T wave changes that are nonspecific no ST segment changes.





Seen in our ICU on Vapotherm.  She is still complaining of weakness and diarrhea

to me, 2 BM this morning. Afebrile. Still with cough today.





Vitals/I&O


Vitals/I&O:





                                   Vital Signs








  Date Time  Temp Pulse Resp B/P (MAP) Pulse Ox O2 Delivery O2 Flow Rate FiO2


 


8/6/20 06:00  61 26 112/56 (74) 92 Vapotherm  40.0 


 


8/6/20 04:00 98.3       





 98.3       














                                    I & O   


 


 8/5/20 8/5/20 8/6/20





 15:00 23:00 07:00


 


Intake Total 580 ml 748 ml 300 ml


 


Balance 580 ml 748 ml 300 ml











Physical Exam


General:  Alert, Oriented X3, Cooperative, moderate distress


Abdomen:  Normal bowel sounds, Soft, No tenderness, No hepatosplenomegaly, No 

masses


Extremities:  No clubbing, No cyanosis, No edema, Normal pulses, No 

tenderness/swelling


Skin:  No rashes, No breakdown, No significant lesion





Labs


Labs:





Laboratory Tests








Test


 8/5/20


11:10 8/5/20


11:25 8/6/20


05:30


 


White Blood Count


 4.6 x10^3/uL


(4.0-11.0) 


 8.6 x10^3/uL


(4.0-11.0)


 


Red Blood Count


 4.34 x10^6/uL


(3.50-5.40) 


 4.27 x10^6/uL


(3.50-5.40)


 


Hemoglobin


 13.0 g/dL


(12.0-15.5) 


 12.7 g/dL


(12.0-15.5)


 


Hematocrit


 38.5 %


(36.0-47.0) 


 37.9 %


(36.0-47.0)


 


Mean Corpuscular Volume 89 fL ()   89 fL () 


 


Mean Corpuscular Hemoglobin 30 pg (25-35)   30 pg (25-35) 


 


Mean Corpuscular Hemoglobin


Concent 34 g/dL


(31-37) 


 34 g/dL


(31-37)


 


Red Cell Distribution Width


 13.9 %


(11.5-14.5) 


 14.3 %


(11.5-14.5)


 


Platelet Count


 297 x10^3/uL


(140-400) 


 355 x10^3/uL


(140-400)


 


Neutrophils (%) (Auto) 81 % (31-73)   87 % (31-73) 


 


Lymphocytes (%) (Auto) 14 % (24-48)   8 % (24-48) 


 


Monocytes (%) (Auto) 6 % (0-9)   5 % (0-9) 


 


Eosinophils (%) (Auto) 0 % (0-3)   0 % (0-3) 


 


Basophils (%) (Auto) 0 % (0-3)   0 % (0-3) 


 


Neutrophils # (Auto)


 3.7 x10^3/uL


(1.8-7.7) 


 7.5 x10^3/uL


(1.8-7.7)


 


Lymphocytes # (Auto)


 0.6 x10^3/uL


(1.0-4.8) 


 0.7 x10^3/uL


(1.0-4.8)


 


Monocytes # (Auto)


 0.3 x10^3/uL


(0.0-1.1) 


 0.5 x10^3/uL


(0.0-1.1)


 


Eosinophils # (Auto)


 0.0 x10^3/uL


(0.0-0.7) 


 0.0 x10^3/uL


(0.0-0.7)


 


Basophils # (Auto)


 0.0 x10^3/uL


(0.0-0.2) 


 0.0 x10^3/uL


(0.0-0.2)


 


Prothrombin Time


 13.9 SEC


(11.7-14.0) 


 





 


Prothromb Time International


Ratio 1.1 (0.8-1.1) 


 


 





 


Sodium Level


 142 mmol/L


(136-145) 


 





 


Potassium Level


 3.8 mmol/L


(3.5-5.1) 


 





 


Chloride Level


 108 mmol/L


() 


 





 


Carbon Dioxide Level


 27 mmol/L


(21-32) 


 





 


Anion Gap 7 (6-14)   


 


Blood Urea Nitrogen


 21 mg/dL


(7-20) 


 





 


Creatinine


 0.9 mg/dL


(0.6-1.0) 


 





 


Estimated GFR


(Cockcroft-Gault) 62.1 


 


 





 


BUN/Creatinine Ratio 23 (6-20)   


 


Glucose Level


 143 mg/dL


(70-99) 


 





 


Calcium Level


 8.0 mg/dL


(8.5-10.1) 


 





 


Total Bilirubin


 0.3 mg/dL


(0.2-1.0) 


 





 


Aspartate Amino Transf


(AST/SGOT) 63 U/L (15-37) 


 


 





 


Alanine Aminotransferase


(ALT/SGPT) 44 U/L (14-59) 


 


 





 


Alkaline Phosphatase


 50 U/L


() 


 





 


NT-Pro-B-Type Natriuretic


Peptide 968 pg/mL


(0-124) 


 





 


Total Protein


 6.4 g/dL


(6.4-8.2) 


 





 


Albumin


 2.7 g/dL


(3.4-5.0) 


 





 


Albumin/Globulin Ratio 0.7 (1.0-1.7)   


 


O2 Saturation  92 % (92-99)  


 


Arterial Blood pH


 


 7.42


(7.35-7.45) 





 


Arterial Blood pCO2 at


Patient Temp 


 33 mmHg


(35-46) 





 


Arterial Blood pO2 at Patient


Temp 


 64 mmHg


() 





 


Arterial Blood HCO3


 


 21 mmol/L


(21-28) 





 


Arterial Blood Base Excess


 


 -3 mmol/L


(-3-3) 





 


FiO2  100% vapotherm  











Comment


Review of Relevant


I have reviewed the following items anum (where applicable) has been applied.


Medications:





Current Medications








 Medications


  (Trade)  Dose


 Ordered  Sig/Gaby


 Route


 PRN Reason  Start Time


 Stop Time Status Last Admin


Dose Admin


 


 Enoxaparin Sodium


  (Lovenox 40mg


 Syringe)  40 mg  Q12HR


 SQ


   8/5/20 10:30


    8/5/20 21:51





 


 Methylprednisolone


 Sodium Succinate


  (SOLU-Medrol


 40MG VIAL)  40 mg  Q8HRS


 IV


   8/5/20 10:30


    8/6/20 05:50





 


 Zinc Sulfate


  (Orazinc)  220 mg  DAILY


 PO


   8/5/20 11:00


    8/5/20 12:12





 


 Piperacillin Sod/


 Tazobactam Sod


 3.375 gm/Sodium


 Chloride  50 ml @ 


 100 mls/hr  Q6HRS


 IV


   8/5/20 12:00


    8/6/20 05:50





 


 Non-Formulary


 Medication 1 ea/


 Sodium Chloride  230 ml @ 


 460 mls/hr  Q24H


 IV


   8/5/20 13:00


 8/8/20 13:29  8/5/20 13:03





 


 Sterile Water


  (WATER for RESP)  1,000 ml  CONT  PRN


 INH


 VIA VAPOTHERM DEVICE  8/5/20 16:15


    8/6/20 04:47














Justicifation of Admission Dx:


Justifications for Admission:


Justification of Admission Dx:  Yes


Respiratory Failure:  Severe Resp Distress











BLAIR ERICKSON MD         Aug 6, 2020 07:55

## 2020-08-06 NOTE — PDOC
Infectious Disease Note


Subjective


Subjective


pt is feeling ok, still requiring a lot of o2





ROS


ROS


no n/v/d/fever





Vital Sign


Vital Signs





Vital Signs








  Date Time  Temp Pulse Resp B/P (MAP) Pulse Ox O2 Delivery O2 Flow Rate FiO2


 


8/6/20 07:48     96 Vapotherm 40.0 


 


8/6/20 06:00  61 26 112/56 (74)    


 


8/6/20 04:00 98.3       





 98.3       











Physical Exam


PHYSICAL EXAM


GENERAL:  Alert and oriented female, not in distress.


VITAL SIGNS:  Stable, 


 


HEENT:  NAD.


NECK:  Supple, no JVP, no lymphadenopathy.


LUNGS:  Clear.


HEART:  S1, S2 regular.


ABDOMEN:  Benign.


EXTREMITIES:  No edema or cyanosis.


SKIN:  Unremarkable.


NEUROLOGIC:  The patient is alert, awake and appropriate.  No focal neurologic


deficit.





Labs


Lab





Laboratory Tests








Test


 8/5/20


11:10 8/5/20


11:25 8/6/20


05:30 8/6/20


07:55


 


White Blood Count


 4.6 x10^3/uL


(4.0-11.0) 


 8.6 x10^3/uL


(4.0-11.0) 





 


Red Blood Count


 4.34 x10^6/uL


(3.50-5.40) 


 4.27 x10^6/uL


(3.50-5.40) 





 


Hemoglobin


 13.0 g/dL


(12.0-15.5) 


 12.7 g/dL


(12.0-15.5) 





 


Hematocrit


 38.5 %


(36.0-47.0) 


 37.9 %


(36.0-47.0) 





 


Mean Corpuscular Volume 89 fL ()   89 fL ()  


 


Mean Corpuscular Hemoglobin 30 pg (25-35)   30 pg (25-35)  


 


Mean Corpuscular Hemoglobin


Concent 34 g/dL


(31-37) 


 34 g/dL


(31-37) 





 


Red Cell Distribution Width


 13.9 %


(11.5-14.5) 


 14.3 %


(11.5-14.5) 





 


Platelet Count


 297 x10^3/uL


(140-400) 


 355 x10^3/uL


(140-400) 





 


Neutrophils (%) (Auto) 81 % (31-73)   87 % (31-73)  


 


Lymphocytes (%) (Auto) 14 % (24-48)   8 % (24-48)  


 


Monocytes (%) (Auto) 6 % (0-9)   5 % (0-9)  


 


Eosinophils (%) (Auto) 0 % (0-3)   0 % (0-3)  


 


Basophils (%) (Auto) 0 % (0-3)   0 % (0-3)  


 


Neutrophils # (Auto)


 3.7 x10^3/uL


(1.8-7.7) 


 7.5 x10^3/uL


(1.8-7.7) 





 


Lymphocytes # (Auto)


 0.6 x10^3/uL


(1.0-4.8) 


 0.7 x10^3/uL


(1.0-4.8) 





 


Monocytes # (Auto)


 0.3 x10^3/uL


(0.0-1.1) 


 0.5 x10^3/uL


(0.0-1.1) 





 


Eosinophils # (Auto)


 0.0 x10^3/uL


(0.0-0.7) 


 0.0 x10^3/uL


(0.0-0.7) 





 


Basophils # (Auto)


 0.0 x10^3/uL


(0.0-0.2) 


 0.0 x10^3/uL


(0.0-0.2) 





 


Prothrombin Time


 13.9 SEC


(11.7-14.0) 


 


 





 


Prothromb Time International


Ratio 1.1 (0.8-1.1) 


 


 


 





 


Sodium Level


 142 mmol/L


(136-145) 


 


 





 


Potassium Level


 3.8 mmol/L


(3.5-5.1) 


 


 





 


Chloride Level


 108 mmol/L


() 


 


 





 


Carbon Dioxide Level


 27 mmol/L


(21-32) 


 


 





 


Anion Gap 7 (6-14)    


 


Blood Urea Nitrogen


 21 mg/dL


(7-20) 


 


 





 


Creatinine


 0.9 mg/dL


(0.6-1.0) 


 


 





 


Estimated GFR


(Cockcroft-Gault) 62.1 


 


 


 





 


BUN/Creatinine Ratio 23 (6-20)    


 


Glucose Level


 143 mg/dL


(70-99) 


 


 





 


Calcium Level


 8.0 mg/dL


(8.5-10.1) 


 


 





 


Total Bilirubin


 0.3 mg/dL


(0.2-1.0) 


 


 





 


Aspartate Amino Transf


(AST/SGOT) 63 U/L (15-37) 


 


 


 





 


Alanine Aminotransferase


(ALT/SGPT) 44 U/L (14-59) 


 


 


 





 


Alkaline Phosphatase


 50 U/L


() 


 


 





 


NT-Pro-B-Type Natriuretic


Peptide 968 pg/mL


(0-124) 


 


 





 


Total Protein


 6.4 g/dL


(6.4-8.2) 


 


 





 


Albumin


 2.7 g/dL


(3.4-5.0) 


 


 





 


Albumin/Globulin Ratio 0.7 (1.0-1.7)    


 


O2 Saturation  92 % (92-99)   87 % (92-99) 


 


Arterial Blood pH


 


 7.42


(7.35-7.45) 


 7.41


(7.35-7.45)


 


Arterial Blood pCO2 at


Patient Temp 


 33 mmHg


(35-46) 


 40 mmHg


(35-46)


 


Arterial Blood pO2 at Patient


Temp 


 64 mmHg


() 


 53 mmHg


()


 


Arterial Blood HCO3


 


 21 mmol/L


(21-28) 


 25 mmol/L


(21-28)


 


Arterial Blood Base Excess


 


 -3 mmol/L


(-3-3) 


 0 mmol/L


(-3-3)


 


FiO2  100% vapotherm   100% 


 


Oxyhemoglobin    86.9 % 


 


Methemoglobin


 


 


 


 0.3 %


(0.0-1.9)


 


Carbon Monoxide, Quantitative


 


 


 


 0.3 %


(0.0-1.9)











Objective


Assessment


IMPRESSION:





1.  COVID-19 positive.


2.  Bilateral pulmonary infiltrate.


3.  Hypoxemia.


4.  Obesity.





Plan


Plan of Care


cont supportive care


covid con plasma given


on Remdesivir











LING AYALA MD                Aug 6, 2020 08:08

## 2020-08-07 VITALS — DIASTOLIC BLOOD PRESSURE: 57 MMHG | SYSTOLIC BLOOD PRESSURE: 119 MMHG

## 2020-08-07 VITALS — SYSTOLIC BLOOD PRESSURE: 105 MMHG | DIASTOLIC BLOOD PRESSURE: 52 MMHG

## 2020-08-07 VITALS — SYSTOLIC BLOOD PRESSURE: 114 MMHG | DIASTOLIC BLOOD PRESSURE: 54 MMHG

## 2020-08-07 VITALS — SYSTOLIC BLOOD PRESSURE: 117 MMHG | DIASTOLIC BLOOD PRESSURE: 60 MMHG

## 2020-08-07 VITALS — DIASTOLIC BLOOD PRESSURE: 67 MMHG | SYSTOLIC BLOOD PRESSURE: 132 MMHG

## 2020-08-07 VITALS — DIASTOLIC BLOOD PRESSURE: 50 MMHG | SYSTOLIC BLOOD PRESSURE: 112 MMHG

## 2020-08-07 VITALS — SYSTOLIC BLOOD PRESSURE: 115 MMHG | DIASTOLIC BLOOD PRESSURE: 54 MMHG

## 2020-08-07 VITALS — DIASTOLIC BLOOD PRESSURE: 68 MMHG | SYSTOLIC BLOOD PRESSURE: 140 MMHG

## 2020-08-07 VITALS — DIASTOLIC BLOOD PRESSURE: 69 MMHG | SYSTOLIC BLOOD PRESSURE: 139 MMHG

## 2020-08-07 VITALS — SYSTOLIC BLOOD PRESSURE: 120 MMHG | DIASTOLIC BLOOD PRESSURE: 62 MMHG

## 2020-08-07 VITALS — SYSTOLIC BLOOD PRESSURE: 125 MMHG | DIASTOLIC BLOOD PRESSURE: 63 MMHG

## 2020-08-07 VITALS — SYSTOLIC BLOOD PRESSURE: 112 MMHG | DIASTOLIC BLOOD PRESSURE: 53 MMHG

## 2020-08-07 VITALS — SYSTOLIC BLOOD PRESSURE: 130 MMHG | DIASTOLIC BLOOD PRESSURE: 63 MMHG

## 2020-08-07 VITALS — SYSTOLIC BLOOD PRESSURE: 115 MMHG | DIASTOLIC BLOOD PRESSURE: 56 MMHG

## 2020-08-07 VITALS — DIASTOLIC BLOOD PRESSURE: 60 MMHG | SYSTOLIC BLOOD PRESSURE: 119 MMHG

## 2020-08-07 VITALS — DIASTOLIC BLOOD PRESSURE: 57 MMHG | SYSTOLIC BLOOD PRESSURE: 126 MMHG

## 2020-08-07 VITALS — SYSTOLIC BLOOD PRESSURE: 122 MMHG | DIASTOLIC BLOOD PRESSURE: 53 MMHG

## 2020-08-07 VITALS — SYSTOLIC BLOOD PRESSURE: 125 MMHG | DIASTOLIC BLOOD PRESSURE: 66 MMHG

## 2020-08-07 VITALS — DIASTOLIC BLOOD PRESSURE: 53 MMHG | SYSTOLIC BLOOD PRESSURE: 107 MMHG

## 2020-08-07 VITALS — DIASTOLIC BLOOD PRESSURE: 62 MMHG | SYSTOLIC BLOOD PRESSURE: 132 MMHG

## 2020-08-07 VITALS — DIASTOLIC BLOOD PRESSURE: 61 MMHG | SYSTOLIC BLOOD PRESSURE: 112 MMHG

## 2020-08-07 VITALS — SYSTOLIC BLOOD PRESSURE: 119 MMHG | DIASTOLIC BLOOD PRESSURE: 56 MMHG

## 2020-08-07 VITALS — DIASTOLIC BLOOD PRESSURE: 67 MMHG | SYSTOLIC BLOOD PRESSURE: 119 MMHG

## 2020-08-07 VITALS — DIASTOLIC BLOOD PRESSURE: 52 MMHG | SYSTOLIC BLOOD PRESSURE: 118 MMHG

## 2020-08-07 LAB
% BANDS: 2 % (ref 0–9)
% LYMPHS: 4 % (ref 24–48)
% MONOS: 7 % (ref 0–10)
% SEGS: 87 % (ref 35–66)
ALBUMIN SERPL-MCNC: 2.4 G/DL (ref 3.4–5)
ALBUMIN/GLOB SERPL: 0.7 {RATIO} (ref 1–1.7)
ALP SERPL-CCNC: 67 U/L (ref 46–116)
ALT SERPL-CCNC: 40 U/L (ref 14–59)
ANION GAP SERPL CALC-SCNC: 9 MMOL/L (ref 6–14)
AST SERPL-CCNC: 41 U/L (ref 15–37)
BASE EXCESS ABG: 1 MMOL/L (ref -3–3)
BASOPHILS # BLD AUTO: 0 X10^3/UL (ref 0–0.2)
BASOPHILS NFR BLD: 0 % (ref 0–3)
BILIRUB SERPL-MCNC: 0.5 MG/DL (ref 0.2–1)
BUN SERPL-MCNC: 24 MG/DL (ref 7–20)
BUN/CREAT SERPL: 30 (ref 6–20)
CALCIUM SERPL-MCNC: 8 MG/DL (ref 8.5–10.1)
CHLORIDE SERPL-SCNC: 111 MMOL/L (ref 98–107)
CO2 SERPL-SCNC: 28 MMOL/L (ref 21–32)
CREAT SERPL-MCNC: 0.8 MG/DL (ref 0.6–1)
EOSINOPHIL NFR BLD: 0 % (ref 0–3)
EOSINOPHIL NFR BLD: 0 X10^3/UL (ref 0–0.7)
ERYTHROCYTE [DISTWIDTH] IN BLOOD BY AUTOMATED COUNT: 14.2 % (ref 11.5–14.5)
GFR SERPLBLD BASED ON 1.73 SQ M-ARVRAT: 71.1 ML/MIN
GLOBULIN SER-MCNC: 3.4 G/DL (ref 2.2–3.8)
GLUCOSE SERPL-MCNC: 120 MG/DL (ref 70–99)
HCO3 BLDA-SCNC: 25 MMOL/L (ref 21–28)
HCT VFR BLD CALC: 38.7 % (ref 36–47)
HGB BLD-MCNC: 12.9 G/DL (ref 12–15.5)
INSPIRATION SETTING TIME VENT: (no result)
LYMPHOCYTES # BLD: 0.6 X10^3/UL (ref 1–4.8)
LYMPHOCYTES NFR BLD AUTO: 6 % (ref 24–48)
MCH RBC QN AUTO: 30 PG (ref 25–35)
MCHC RBC AUTO-ENTMCNC: 33 G/DL (ref 31–37)
MCV RBC AUTO: 89 FL (ref 79–100)
MONO #: 0.6 X10^3/UL (ref 0–1.1)
MONOCYTES NFR BLD: 6 % (ref 0–9)
NEUT #: 8.8 X10^3/UL (ref 1.8–7.7)
NEUTROPHILS NFR BLD AUTO: 88 % (ref 31–73)
NRBC # BLD MANUAL: 1 10*3/UL
PCO2 BLDA: 37 MMHG (ref 35–46)
PLATELET # BLD AUTO: 377 X10^3/UL (ref 140–400)
PLATELET # BLD EST: ADEQUATE 10*3/UL
PO2 BLDA: 63 MMHG (ref 65–108)
POTASSIUM SERPL-SCNC: 3.6 MMOL/L (ref 3.5–5.1)
PROT SERPL-MCNC: 5.8 G/DL (ref 6.4–8.2)
RBC # BLD AUTO: 4.35 X10^6/UL (ref 3.5–5.4)
SAO2 % BLDA: 92 % (ref 92–99)
SODIUM SERPL-SCNC: 148 MMOL/L (ref 136–145)
WBC # BLD AUTO: 9.9 X10^3/UL (ref 4–11)

## 2020-08-07 PROCEDURE — 5A09457 ASSISTANCE WITH RESPIRATORY VENTILATION, 24-96 CONSECUTIVE HOURS, CONTINUOUS POSITIVE AIRWAY PRESSURE: ICD-10-PCS | Performed by: INTERNAL MEDICINE

## 2020-08-07 RX ADMIN — PIPERACILLIN SODIUM AND TAZOBACTAM SODIUM SCH MLS/HR: 3; .375 INJECTION, POWDER, LYOPHILIZED, FOR SOLUTION INTRAVENOUS at 00:12

## 2020-08-07 RX ADMIN — Medication SCH MLS/HR: at 14:11

## 2020-08-07 RX ADMIN — METHYLPREDNISOLONE SODIUM SUCCINATE SCH MG: 40 INJECTION, POWDER, FOR SOLUTION INTRAMUSCULAR; INTRAVENOUS at 14:14

## 2020-08-07 RX ADMIN — Medication SCH CAP: at 20:15

## 2020-08-07 RX ADMIN — Medication SCH CAP: at 08:26

## 2020-08-07 RX ADMIN — METHYLPREDNISOLONE SODIUM SUCCINATE SCH MG: 40 INJECTION, POWDER, FOR SOLUTION INTRAMUSCULAR; INTRAVENOUS at 21:33

## 2020-08-07 RX ADMIN — METHYLPREDNISOLONE SODIUM SUCCINATE SCH MG: 40 INJECTION, POWDER, FOR SOLUTION INTRAMUSCULAR; INTRAVENOUS at 06:12

## 2020-08-07 RX ADMIN — ENOXAPARIN SODIUM SCH MG: 40 INJECTION SUBCUTANEOUS at 08:27

## 2020-08-07 RX ADMIN — ZINC SULFATE CAP 220 MG (50 MG ELEMENTAL ZN) SCH MG: 220 (50 ZN) CAP at 08:26

## 2020-08-07 RX ADMIN — PIPERACILLIN SODIUM AND TAZOBACTAM SODIUM SCH MLS/HR: 3; .375 INJECTION, POWDER, LYOPHILIZED, FOR SOLUTION INTRAVENOUS at 06:10

## 2020-08-07 RX ADMIN — PIPERACILLIN SODIUM AND TAZOBACTAM SODIUM SCH MLS/HR: 3; .375 INJECTION, POWDER, LYOPHILIZED, FOR SOLUTION INTRAVENOUS at 19:11

## 2020-08-07 RX ADMIN — ENOXAPARIN SODIUM SCH MG: 40 INJECTION SUBCUTANEOUS at 20:15

## 2020-08-07 RX ADMIN — PIPERACILLIN SODIUM AND TAZOBACTAM SODIUM SCH MLS/HR: 3; .375 INJECTION, POWDER, LYOPHILIZED, FOR SOLUTION INTRAVENOUS at 12:54

## 2020-08-07 NOTE — PDOC
Infectious Disease Note


Subjective


Subjective


pt is feeling ok, still requiring a lot of o2





ROS


ROS


no n/v/d/fever





Vital Sign


Vital Signs





Vital Signs








  Date Time  Temp Pulse Resp B/P (MAP) Pulse Ox O2 Delivery O2 Flow Rate FiO2


 


8/7/20 07:00  55 22 112/53 (72) 96 BiPAP/CPAP  


 


8/7/20 04:00 97.4       





 97.4       


 


8/6/20 23:00       40.0 











Physical Exam


PHYSICAL EXAM


GENERAL:  Alert and oriented female, not in distress.


VITAL SIGNS:  Stable, 


 


HEENT:  NAD.


NECK:  Supple, no JVP, no lymphadenopathy.


LUNGS:  Clear.


HEART:  S1, S2 regular.


ABDOMEN:  Benign.


EXTREMITIES:  No edema or cyanosis.


SKIN:  Unremarkable.


NEUROLOGIC:  The patient is alert, awake and appropriate.  No focal neurologic


deficit.





Labs


Lab





Laboratory Tests








Test


 8/6/20


10:43 8/7/20


07:25


 


D-Dimer (Gabby)


 0.87 ug/mlFEU


(0.00-0.50) 





 


White Blood Count


 


 9.9 x10^3/uL


(4.0-11.0)


 


Red Blood Count


 


 4.35 x10^6/uL


(3.50-5.40)


 


Hemoglobin


 


 12.9 g/dL


(12.0-15.5)


 


Hematocrit


 


 38.7 %


(36.0-47.0)


 


Mean Corpuscular Volume  89 fL () 


 


Mean Corpuscular Hemoglobin  30 pg (25-35) 


 


Mean Corpuscular Hemoglobin


Concent 


 33 g/dL


(31-37)


 


Red Cell Distribution Width


 


 14.2 %


(11.5-14.5)


 


Platelet Count


 


 377 x10^3/uL


(140-400)


 


Neutrophils (%) (Auto)  88 % (31-73) 


 


Lymphocytes (%) (Auto)  6 % (24-48) 


 


Monocytes (%) (Auto)  6 % (0-9) 


 


Eosinophils (%) (Auto)  0 % (0-3) 


 


Basophils (%) (Auto)  0 % (0-3) 


 


Neutrophils # (Auto)


 


 8.8 x10^3/uL


(1.8-7.7)


 


Lymphocytes # (Auto)


 


 0.6 x10^3/uL


(1.0-4.8)


 


Monocytes # (Auto)


 


 0.6 x10^3/uL


(0.0-1.1)


 


Eosinophils # (Auto)


 


 0.0 x10^3/uL


(0.0-0.7)


 


Basophils # (Auto)


 


 0.0 x10^3/uL


(0.0-0.2)











Objective


Assessment


IMPRESSION:





1.  COVID-19 positive.


2.  Bilateral pulmonary infiltrate.


3.  Hypoxemia.


4.  Obesity.





Plan


Plan of Care


cont supportive care


covid con plasma given


on Remdesivir


steroids











LING AYALA MD                Aug 7, 2020 08:33

## 2020-08-07 NOTE — PDOC
[employer] PROGRESS NOTE


Date of Service


DOS:


DATE: 8/7/20 


TIME: 08:02





Chief Complaint


Chief Complaint


A/P:


Acute hypoxic respiratory failuresecondary to SARSCo.2 pneumonia.  Continue 

supportive therapy, steroids room to severe protocol initiated on 8 4 will 

continue here.  Consult ID and pulmonology


SARS- COV2 pneumonia -continue IV Solu-Medrol wean O2 as tolerated I discussed 

with pulmonology to move to a negative pressure room if her desaturations worsen

and she may need BiPAP.. Will try experimental drug to severe and convalescent 

plasma therapies with ID and pulmonology.


Hypokalemiawill replace, check magnesium level


Diarrhealikely COVID-19 related.  Will check for C. difficile per protocol if 

more than 3 bowel movements in a day


Transaminitis also likely related COVID-19, will monitor


Severe protein calorie malnutritionlikely related to above





FEN - General diet


PPX - lovenox


FULL CODE


Dispo - ICU for above


CC time 38 minutes





History of Present Illness


History of Present Illness


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 


On 7/30/2020 she began having symptoms of diarrhea and shortness of breath and 

went to the ED to have COVID-19 testing, she was notified of the results on 

7/31/2020 and asked to quarantine at home for 14 days.  Over the course the next

5 days she became progressively more weak had fever and chills and began having 

myalgias and worsening diarrhea with little bit of abdominal pain.  She returned

to the ED on 8/4/2020 at [facility] and was found to be hypoxic.


ABG on 8/4/2020 7.472/30 5.1/94.3 with HCO3 of 26.2 on nonrebreather 10 L labs 

on 8 4 sodium 135, K3.2, chloride 99, CO2 25, BUN 17, creatinine 1, glucose 115,

lactate 1.9, calcium 8, ferritin 1491, bilirubin 0.4, , ALT 5 8, AL K 

phosphatase 62 troponin negative, .6, total protein 6.3, albumin 2.7 

prealbumin 9, lipase 185, B12 995, procalcitonin 0.40, TSH 1.427, d-dimer 1.16, 

WBC 7.1, Hb 14.1, platelets 302 chest x-ray on 8 4 read as worsening 

interstitial and airspace opacities in the right upper lobe and left lung base 

and worsening interstitial opacities in the right lung base compatible with 

multifocal pneumonia


EKG appears normal sinus rhythm with heart rate approximately 85 bpm with normal

axis some inferior T wave changes that are nonspecific no ST segment changes.





8/6: Seen in our ICU on Vapotherm.  She is still complaining of weakness and 

diarrhea to me, 2 BM this morning. Afebrile. Still with cough today. O2 53 on 

Vapotherm. Remdesivir and convalescent FFP given





O2 63 on ABG this morning after BIPAP overnight. She feels a bit improved, 

appetite improved. No diarrhea yet today. Still weak with myalgias.





Vitals/I&O


Vitals/I&O:





                                   Vital Signs








  Date Time  Temp Pulse Resp B/P (MAP) Pulse Ox O2 Delivery O2 Flow Rate FiO2


 


8/7/20 07:00  55 22 112/53 (72) 96 BiPAP/CPAP  


 


8/7/20 04:00 97.4       





 97.4       


 


8/6/20 23:00       40.0 














                                    I & O   


 


 8/6/20 8/6/20 8/7/20





 15:00 23:00 07:00


 


Intake Total 720 ml 480 ml 0 ml


 


Output Total 440 ml 250 ml 0 ml


 


Balance 280 ml 230 ml 0 ml











Physical Exam


Physical Exam:


GENERAL:  Alert and oriented female, not in distress.


VITAL SIGNS:  Stable, 


 


HEENT:  NAD.


NECK:  Supple, no JVP, no lymphadenopathy.


LUNGS:  Clear.


HEART:  S1, S2 regular.


ABDOMEN:  Benign.


EXTREMITIES:  No edema or cyanosis.


SKIN:  Unremarkable.


NEUROLOGIC:  The patient is alert, awake and appropriate.  No focal neurologic


deficit.


General:  Alert, Oriented X3, Cooperative, moderate distress


Abdomen:  Normal bowel sounds, Soft, No tenderness, No hepatosplenomegaly, No 

masses


Extremities:  No clubbing, No cyanosis, No edema, Normal pulses, No 

tenderness/swelling


Skin:  No rashes, No breakdown, No significant lesion





Labs


Labs:





Laboratory Tests








Test


 8/6/20


10:43


 


D-Dimer (Gabby)


 0.87 ug/mlFEU


(0.00-0.50)











Comment


Review of Relevant


I have reviewed the following items anum (where applicable) has been applied.


Medications:





Current Medications








 Medications


  (Trade)  Dose


 Ordered  Sig/Gaby


 Route


 PRN Reason  Start Time


 Stop Time Status Last Admin


Dose Admin


 


 Lactobacillus


 Rhamnosus


  (Culturelle)  1 cap  BID


 PO


   8/6/20 21:00


    8/6/20 20:29














Justicifation of Admission Dx:


Justifications for Admission:


Justification of Admission Dx:  Yes


Respiratory Failure:  Severe Resp Distress











BLAIR ERICKSON MD         Aug 7, 2020 08:03

## 2020-08-07 NOTE — PDOC
PULMONARY PROGRESS NOTES


DATE: 8/7/20 


TIME: 11:55


Subjective


on BIPAP, 100%FIO2


nursing reports oxygen desaturation with minimal activity


Vitals





Vital Signs








  Date Time  Temp Pulse Resp B/P (MAP) Pulse Ox O2 Delivery O2 Flow Rate FiO2


 


8/7/20 11:50     98 BiPAP/CPAP  


 


8/7/20 11:00  63 26 105/52 (69)    


 


8/7/20 08:00 97.6       





 97.6       


 


8/6/20 23:00       40.0 








Comments


Visual exam preformed pt. seen during COVID 19 Pandemic


Vapotherm 100% 


RRR, no edema 


No rash


ROS:  No Nausea, No Chest Pain, No Abdominal Pain, No Increase Cough


Labs





Laboratory Tests








Test


 8/6/20


05:30 8/6/20


07:55 8/6/20


10:43 8/7/20


07:25


 


White Blood Count


 8.6 x10^3/uL


(4.0-11.0) 


 


 9.9 x10^3/uL


(4.0-11.0)


 


Red Blood Count


 4.27 x10^6/uL


(3.50-5.40) 


 


 4.35 x10^6/uL


(3.50-5.40)


 


Hemoglobin


 12.7 g/dL


(12.0-15.5) 


 


 12.9 g/dL


(12.0-15.5)


 


Hematocrit


 37.9 %


(36.0-47.0) 


 


 38.7 %


(36.0-47.0)


 


Mean Corpuscular Volume 89 fL ()    89 fL () 


 


Mean Corpuscular Hemoglobin 30 pg (25-35)    30 pg (25-35) 


 


Mean Corpuscular Hemoglobin


Concent 34 g/dL


(31-37) 


 


 33 g/dL


(31-37)


 


Red Cell Distribution Width


 14.3 %


(11.5-14.5) 


 


 14.2 %


(11.5-14.5)


 


Platelet Count


 355 x10^3/uL


(140-400) 


 


 377 x10^3/uL


(140-400)


 


Neutrophils (%) (Auto) 87 % (31-73)    88 % (31-73) 


 


Lymphocytes (%) (Auto) 8 % (24-48)    6 % (24-48) 


 


Monocytes (%) (Auto) 5 % (0-9)    6 % (0-9) 


 


Eosinophils (%) (Auto) 0 % (0-3)    0 % (0-3) 


 


Basophils (%) (Auto) 0 % (0-3)    0 % (0-3) 


 


Neutrophils # (Auto)


 7.5 x10^3/uL


(1.8-7.7) 


 


 8.8 x10^3/uL


(1.8-7.7)


 


Lymphocytes # (Auto)


 0.7 x10^3/uL


(1.0-4.8) 


 


 0.6 x10^3/uL


(1.0-4.8)


 


Monocytes # (Auto)


 0.5 x10^3/uL


(0.0-1.1) 


 


 0.6 x10^3/uL


(0.0-1.1)


 


Eosinophils # (Auto)


 0.0 x10^3/uL


(0.0-0.7) 


 


 0.0 x10^3/uL


(0.0-0.7)


 


Basophils # (Auto)


 0.0 x10^3/uL


(0.0-0.2) 


 


 0.0 x10^3/uL


(0.0-0.2)


 


O2 Saturation  87 % (92-99)   


 


Arterial Blood pH


 


 7.41


(7.35-7.45) 


 





 


Arterial Blood pCO2 at


Patient Temp 


 40 mmHg


(35-46) 


 





 


Arterial Blood pO2 at Patient


Temp 


 53 mmHg


() 


 





 


Arterial Blood HCO3


 


 25 mmol/L


(21-28) 


 





 


Arterial Blood Base Excess


 


 0 mmol/L


(-3-3) 


 





 


Oxyhemoglobin  86.9 %   


 


Methemoglobin


 


 0.3 %


(0.0-1.9) 


 





 


Carbon Monoxide, Quantitative


 


 0.3 %


(0.0-1.9) 


 





 


FiO2  100%   


 


D-Dimer (Gabby)


 


 


 0.87 ug/mlFEU


(0.00-0.50) 





 


Segmented Neutrophils %    87 % (35-66) 


 


Band Neutrophils %    2 % (0-9) 


 


Lymphocytes %    4 % (24-48) 


 


Monocytes %    7 % (0-10) 


 


Nucleated Red Blood Cells    1 


 


Platelet Estimate


 


 


 


 Adequate


(ADEQUATE)


 


Sodium Level


 


 


 


 148 mmol/L


(136-145)


 


Potassium Level


 


 


 


 3.6 mmol/L


(3.5-5.1)


 


Chloride Level


 


 


 


 111 mmol/L


()


 


Carbon Dioxide Level


 


 


 


 28 mmol/L


(21-32)


 


Anion Gap    9 (6-14) 


 


Blood Urea Nitrogen


 


 


 


 24 mg/dL


(7-20)


 


Creatinine


 


 


 


 0.8 mg/dL


(0.6-1.0)


 


Estimated GFR


(Cockcroft-Gault) 


 


 


 71.1 





 


BUN/Creatinine Ratio    30 (6-20) 


 


Glucose Level


 


 


 


 120 mg/dL


(70-99)


 


Calcium Level


 


 


 


 8.0 mg/dL


(8.5-10.1)


 


Total Bilirubin


 


 


 


 0.5 mg/dL


(0.2-1.0)


 


Aspartate Amino Transf


(AST/SGOT) 


 


 


 41 U/L (15-37) 





 


Alanine Aminotransferase


(ALT/SGPT) 


 


 


 40 U/L (14-59) 





 


Alkaline Phosphatase


 


 


 


 67 U/L


()


 


Total Protein


 


 


 


 5.8 g/dL


(6.4-8.2)


 


Albumin


 


 


 


 2.4 g/dL


(3.4-5.0)


 


Albumin/Globulin Ratio    0.7 (1.0-1.7) 


 


Test


 8/7/20


08:00 


 


 





 


O2 Saturation 92 % (92-99)    


 


Arterial Blood pH


 7.44


(7.35-7.45) 


 


 





 


Arterial Blood pCO2 at


Patient Temp 37 mmHg


(35-46) 


 


 





 


Arterial Blood pO2 at Patient


Temp 63 mmHg


() 


 


 





 


Arterial Blood HCO3


 25 mmol/L


(21-28) 


 


 





 


Arterial Blood Base Excess


 1 mmol/L


(-3-3) 


 


 





 


FiO2 Bipap 90%    








Laboratory Tests








Test


 8/7/20


07:25 8/7/20


08:00


 


White Blood Count


 9.9 x10^3/uL


(4.0-11.0) 





 


Red Blood Count


 4.35 x10^6/uL


(3.50-5.40) 





 


Hemoglobin


 12.9 g/dL


(12.0-15.5) 





 


Hematocrit


 38.7 %


(36.0-47.0) 





 


Mean Corpuscular Volume 89 fL ()  


 


Mean Corpuscular Hemoglobin 30 pg (25-35)  


 


Mean Corpuscular Hemoglobin


Concent 33 g/dL


(31-37) 





 


Red Cell Distribution Width


 14.2 %


(11.5-14.5) 





 


Platelet Count


 377 x10^3/uL


(140-400) 





 


Neutrophils (%) (Auto) 88 % (31-73)  


 


Lymphocytes (%) (Auto) 6 % (24-48)  


 


Monocytes (%) (Auto) 6 % (0-9)  


 


Eosinophils (%) (Auto) 0 % (0-3)  


 


Basophils (%) (Auto) 0 % (0-3)  


 


Neutrophils # (Auto)


 8.8 x10^3/uL


(1.8-7.7) 





 


Lymphocytes # (Auto)


 0.6 x10^3/uL


(1.0-4.8) 





 


Monocytes # (Auto)


 0.6 x10^3/uL


(0.0-1.1) 





 


Eosinophils # (Auto)


 0.0 x10^3/uL


(0.0-0.7) 





 


Basophils # (Auto)


 0.0 x10^3/uL


(0.0-0.2) 





 


Segmented Neutrophils % 87 % (35-66)  


 


Band Neutrophils % 2 % (0-9)  


 


Lymphocytes % 4 % (24-48)  


 


Monocytes % 7 % (0-10)  


 


Nucleated Red Blood Cells 1  


 


Platelet Estimate


 Adequate


(ADEQUATE) 





 


Sodium Level


 148 mmol/L


(136-145) 





 


Potassium Level


 3.6 mmol/L


(3.5-5.1) 





 


Chloride Level


 111 mmol/L


() 





 


Carbon Dioxide Level


 28 mmol/L


(21-32) 





 


Anion Gap 9 (6-14)  


 


Blood Urea Nitrogen


 24 mg/dL


(7-20) 





 


Creatinine


 0.8 mg/dL


(0.6-1.0) 





 


Estimated GFR


(Cockcroft-Gault) 71.1 


 





 


BUN/Creatinine Ratio 30 (6-20)  


 


Glucose Level


 120 mg/dL


(70-99) 





 


Calcium Level


 8.0 mg/dL


(8.5-10.1) 





 


Total Bilirubin


 0.5 mg/dL


(0.2-1.0) 





 


Aspartate Amino Transf


(AST/SGOT) 41 U/L (15-37) 


 





 


Alanine Aminotransferase


(ALT/SGPT) 40 U/L (14-59) 


 





 


Alkaline Phosphatase


 67 U/L


() 





 


Total Protein


 5.8 g/dL


(6.4-8.2) 





 


Albumin


 2.4 g/dL


(3.4-5.0) 





 


Albumin/Globulin Ratio 0.7 (1.0-1.7)  


 


O2 Saturation  92 % (92-99) 


 


Arterial Blood pH


 


 7.44


(7.35-7.45)


 


Arterial Blood pCO2 at


Patient Temp 


 37 mmHg


(35-46)


 


Arterial Blood pO2 at Patient


Temp 


 63 mmHg


()


 


Arterial Blood HCO3


 


 25 mmol/L


(21-28)


 


Arterial Blood Base Excess


 


 1 mmol/L


(-3-3)


 


FiO2  Bipap 90% 








Comments


CXR


 


IMPRESSION:


 


Bilateral pneumonia, more confluent in the right upper lobe.





Impression


.


IMPRESSION:


1.  Acute hypoxic respiratory failure secondary to COVID-19 pneumonia and acute


lung injury.


2.  No significant tobacco history.


3.  Abnormal chest x-ray consistent with COVID-19 pneumonia.


4.  Mildly increased liver function tests.





Plan


.


RECOMMENDATIONS:


1.  Continue present BIPAP ,100% FiO2.


2.  Monitor the course of treatment.  


3.  Cont. antibiotics.


4. cont. IV steroids 


5.  Monitor for inflammatory markers, D-Dimer 0.87


6.  High dose DVT prophylaxis.


7.  S/P convalescent plasma on 8/5/2020 and remdesivir.


8.  Discussed with RN and RT.  





Critical care time 30 minutes, which includes review of the chart, labs and


data.











MAURI GALLAGHER MD                  Aug 7, 2020 11:58

## 2020-08-08 VITALS — SYSTOLIC BLOOD PRESSURE: 123 MMHG | DIASTOLIC BLOOD PRESSURE: 72 MMHG

## 2020-08-08 VITALS — DIASTOLIC BLOOD PRESSURE: 64 MMHG | SYSTOLIC BLOOD PRESSURE: 124 MMHG

## 2020-08-08 VITALS — SYSTOLIC BLOOD PRESSURE: 132 MMHG | DIASTOLIC BLOOD PRESSURE: 63 MMHG

## 2020-08-08 VITALS — SYSTOLIC BLOOD PRESSURE: 125 MMHG | DIASTOLIC BLOOD PRESSURE: 66 MMHG

## 2020-08-08 VITALS — SYSTOLIC BLOOD PRESSURE: 133 MMHG | DIASTOLIC BLOOD PRESSURE: 64 MMHG

## 2020-08-08 VITALS — DIASTOLIC BLOOD PRESSURE: 59 MMHG | SYSTOLIC BLOOD PRESSURE: 139 MMHG

## 2020-08-08 VITALS — SYSTOLIC BLOOD PRESSURE: 136 MMHG | DIASTOLIC BLOOD PRESSURE: 64 MMHG

## 2020-08-08 VITALS — DIASTOLIC BLOOD PRESSURE: 51 MMHG | SYSTOLIC BLOOD PRESSURE: 113 MMHG

## 2020-08-08 VITALS — SYSTOLIC BLOOD PRESSURE: 129 MMHG | DIASTOLIC BLOOD PRESSURE: 62 MMHG

## 2020-08-08 VITALS — SYSTOLIC BLOOD PRESSURE: 135 MMHG | DIASTOLIC BLOOD PRESSURE: 66 MMHG

## 2020-08-08 VITALS — DIASTOLIC BLOOD PRESSURE: 58 MMHG | SYSTOLIC BLOOD PRESSURE: 136 MMHG

## 2020-08-08 VITALS — DIASTOLIC BLOOD PRESSURE: 54 MMHG | SYSTOLIC BLOOD PRESSURE: 127 MMHG

## 2020-08-08 VITALS — DIASTOLIC BLOOD PRESSURE: 55 MMHG | SYSTOLIC BLOOD PRESSURE: 151 MMHG

## 2020-08-08 VITALS — DIASTOLIC BLOOD PRESSURE: 66 MMHG | SYSTOLIC BLOOD PRESSURE: 139 MMHG

## 2020-08-08 VITALS — DIASTOLIC BLOOD PRESSURE: 60 MMHG | SYSTOLIC BLOOD PRESSURE: 141 MMHG

## 2020-08-08 VITALS — DIASTOLIC BLOOD PRESSURE: 62 MMHG | SYSTOLIC BLOOD PRESSURE: 129 MMHG

## 2020-08-08 VITALS — DIASTOLIC BLOOD PRESSURE: 64 MMHG | SYSTOLIC BLOOD PRESSURE: 141 MMHG

## 2020-08-08 VITALS — SYSTOLIC BLOOD PRESSURE: 139 MMHG | DIASTOLIC BLOOD PRESSURE: 77 MMHG

## 2020-08-08 VITALS — SYSTOLIC BLOOD PRESSURE: 132 MMHG | DIASTOLIC BLOOD PRESSURE: 61 MMHG

## 2020-08-08 VITALS — SYSTOLIC BLOOD PRESSURE: 125 MMHG | DIASTOLIC BLOOD PRESSURE: 69 MMHG

## 2020-08-08 VITALS — SYSTOLIC BLOOD PRESSURE: 122 MMHG | DIASTOLIC BLOOD PRESSURE: 62 MMHG

## 2020-08-08 VITALS — SYSTOLIC BLOOD PRESSURE: 111 MMHG | DIASTOLIC BLOOD PRESSURE: 76 MMHG

## 2020-08-08 LAB
ALBUMIN SERPL-MCNC: 2.3 G/DL (ref 3.4–5)
ALBUMIN/GLOB SERPL: 0.7 {RATIO} (ref 1–1.7)
ALP SERPL-CCNC: 64 U/L (ref 46–116)
ALT SERPL-CCNC: 39 U/L (ref 14–59)
ANION GAP SERPL CALC-SCNC: 7 MMOL/L (ref 6–14)
AST SERPL-CCNC: 33 U/L (ref 15–37)
BASE EXCESS ABG: 2 MMOL/L (ref -3–3)
BASOPHILS # BLD AUTO: 0 X10^3/UL (ref 0–0.2)
BASOPHILS NFR BLD: 0 % (ref 0–3)
BILIRUB SERPL-MCNC: 0.5 MG/DL (ref 0.2–1)
BUN SERPL-MCNC: 22 MG/DL (ref 7–20)
BUN/CREAT SERPL: 37 (ref 6–20)
CALCIUM SERPL-MCNC: 7.8 MG/DL (ref 8.5–10.1)
CHLORIDE SERPL-SCNC: 110 MMOL/L (ref 98–107)
CO2 SERPL-SCNC: 28 MMOL/L (ref 21–32)
CREAT SERPL-MCNC: 0.6 MG/DL (ref 0.6–1)
EOSINOPHIL NFR BLD: 0 % (ref 0–3)
EOSINOPHIL NFR BLD: 0 X10^3/UL (ref 0–0.7)
ERYTHROCYTE [DISTWIDTH] IN BLOOD BY AUTOMATED COUNT: 13.8 % (ref 11.5–14.5)
GFR SERPLBLD BASED ON 1.73 SQ M-ARVRAT: 99.1 ML/MIN
GLOBULIN SER-MCNC: 3.2 G/DL (ref 2.2–3.8)
GLUCOSE SERPL-MCNC: 125 MG/DL (ref 70–99)
HCO3 BLDA-SCNC: 25 MMOL/L (ref 21–28)
HCT VFR BLD CALC: 38.7 % (ref 36–47)
HGB BLD-MCNC: 12.7 G/DL (ref 12–15.5)
INSPIRATION SETTING TIME VENT: 70
LYMPHOCYTES # BLD: 0.5 X10^3/UL (ref 1–4.8)
LYMPHOCYTES NFR BLD AUTO: 5 % (ref 24–48)
MCH RBC QN AUTO: 29 PG (ref 25–35)
MCHC RBC AUTO-ENTMCNC: 33 G/DL (ref 31–37)
MCV RBC AUTO: 89 FL (ref 79–100)
MONO #: 0.4 X10^3/UL (ref 0–1.1)
MONOCYTES NFR BLD: 4 % (ref 0–9)
NEUT #: 9.3 X10^3/UL (ref 1.8–7.7)
NEUTROPHILS NFR BLD AUTO: 91 % (ref 31–73)
PCO2 BLDA: 35 MMHG (ref 35–46)
PLATELET # BLD AUTO: 402 X10^3/UL (ref 140–400)
PO2 BLDA: 69 MMHG (ref 65–108)
POTASSIUM SERPL-SCNC: 3.6 MMOL/L (ref 3.5–5.1)
PROT SERPL-MCNC: 5.5 G/DL (ref 6.4–8.2)
RBC # BLD AUTO: 4.33 X10^6/UL (ref 3.5–5.4)
SAO2 % BLDA: 94 % (ref 92–99)
SODIUM SERPL-SCNC: 145 MMOL/L (ref 136–145)
WBC # BLD AUTO: 10.2 X10^3/UL (ref 4–11)

## 2020-08-08 RX ADMIN — PIPERACILLIN SODIUM AND TAZOBACTAM SODIUM SCH MLS/HR: 3; .375 INJECTION, POWDER, LYOPHILIZED, FOR SOLUTION INTRAVENOUS at 17:56

## 2020-08-08 RX ADMIN — Medication SCH CAP: at 09:39

## 2020-08-08 RX ADMIN — ZINC SULFATE CAP 220 MG (50 MG ELEMENTAL ZN) SCH MG: 220 (50 ZN) CAP at 09:39

## 2020-08-08 RX ADMIN — Medication SCH CAP: at 20:32

## 2020-08-08 RX ADMIN — PIPERACILLIN SODIUM AND TAZOBACTAM SODIUM SCH MLS/HR: 3; .375 INJECTION, POWDER, LYOPHILIZED, FOR SOLUTION INTRAVENOUS at 00:06

## 2020-08-08 RX ADMIN — METHYLPREDNISOLONE SODIUM SUCCINATE SCH MG: 40 INJECTION, POWDER, FOR SOLUTION INTRAMUSCULAR; INTRAVENOUS at 05:56

## 2020-08-08 RX ADMIN — METHYLPREDNISOLONE SODIUM SUCCINATE SCH MG: 40 INJECTION, POWDER, FOR SOLUTION INTRAMUSCULAR; INTRAVENOUS at 20:33

## 2020-08-08 RX ADMIN — ENOXAPARIN SODIUM SCH MG: 40 INJECTION SUBCUTANEOUS at 20:33

## 2020-08-08 RX ADMIN — Medication SCH MLS/HR: at 14:30

## 2020-08-08 RX ADMIN — ENOXAPARIN SODIUM SCH MG: 40 INJECTION SUBCUTANEOUS at 09:39

## 2020-08-08 RX ADMIN — PIPERACILLIN SODIUM AND TAZOBACTAM SODIUM SCH MLS/HR: 3; .375 INJECTION, POWDER, LYOPHILIZED, FOR SOLUTION INTRAVENOUS at 05:56

## 2020-08-08 RX ADMIN — PIPERACILLIN SODIUM AND TAZOBACTAM SODIUM SCH MLS/HR: 3; .375 INJECTION, POWDER, LYOPHILIZED, FOR SOLUTION INTRAVENOUS at 12:12

## 2020-08-08 NOTE — PDOC
PULMONARY PROGRESS NOTES


DATE: 8/8/20 


TIME: 06:39


Subjective


on BIPAP, 70%FIO2, sob better, has occ cough, no pain


gets sob easily


Vitals





Vital Signs








  Date Time  Temp Pulse Resp B/P (MAP) Pulse Ox O2 Delivery O2 Flow Rate FiO2


 


8/8/20 06:00  47 20 124/64 (84) 96 BiPAP/CPAP  


 


8/8/20 04:00 97.9       





 97.9       








Comments


on bipap 


appears comfortable


no paradoxical abd  motion


no audible wheezing


alert


RRR, 


no edema 


No rash


ROS:  No Nausea, No Chest Pain, No Abdominal Pain, No Increase Cough


Labs





Laboratory Tests








Test


 8/6/20


07:55 8/6/20


10:43 8/7/20


07:25 8/7/20


08:00


 


O2 Saturation 87 % (92-99)    92 % (92-99) 


 


Arterial Blood pH


 7.41


(7.35-7.45) 


 


 7.44


(7.35-7.45)


 


Arterial Blood pCO2 at


Patient Temp 40 mmHg


(35-46) 


 


 37 mmHg


(35-46)


 


Arterial Blood pO2 at Patient


Temp 53 mmHg


() 


 


 63 mmHg


()


 


Arterial Blood HCO3


 25 mmol/L


(21-28) 


 


 25 mmol/L


(21-28)


 


Arterial Blood Base Excess


 0 mmol/L


(-3-3) 


 


 1 mmol/L


(-3-3)


 


Oxyhemoglobin 86.9 %    


 


Methemoglobin


 0.3 %


(0.0-1.9) 


 


 





 


Carbon Monoxide, Quantitative


 0.3 %


(0.0-1.9) 


 


 





 


FiO2 100%    Bipap 90% 


 


D-Dimer (Gabby)


 


 0.87 ug/mlFEU


(0.00-0.50) 


 





 


White Blood Count


 


 


 9.9 x10^3/uL


(4.0-11.0) 





 


Red Blood Count


 


 


 4.35 x10^6/uL


(3.50-5.40) 





 


Hemoglobin


 


 


 12.9 g/dL


(12.0-15.5) 





 


Hematocrit


 


 


 38.7 %


(36.0-47.0) 





 


Mean Corpuscular Volume   89 fL ()  


 


Mean Corpuscular Hemoglobin   30 pg (25-35)  


 


Mean Corpuscular Hemoglobin


Concent 


 


 33 g/dL


(31-37) 





 


Red Cell Distribution Width


 


 


 14.2 %


(11.5-14.5) 





 


Platelet Count


 


 


 377 x10^3/uL


(140-400) 





 


Neutrophils (%) (Auto)   88 % (31-73)  


 


Lymphocytes (%) (Auto)   6 % (24-48)  


 


Monocytes (%) (Auto)   6 % (0-9)  


 


Eosinophils (%) (Auto)   0 % (0-3)  


 


Basophils (%) (Auto)   0 % (0-3)  


 


Neutrophils # (Auto)


 


 


 8.8 x10^3/uL


(1.8-7.7) 





 


Lymphocytes # (Auto)


 


 


 0.6 x10^3/uL


(1.0-4.8) 





 


Monocytes # (Auto)


 


 


 0.6 x10^3/uL


(0.0-1.1) 





 


Eosinophils # (Auto)


 


 


 0.0 x10^3/uL


(0.0-0.7) 





 


Basophils # (Auto)


 


 


 0.0 x10^3/uL


(0.0-0.2) 





 


Segmented Neutrophils %   87 % (35-66)  


 


Band Neutrophils %   2 % (0-9)  


 


Lymphocytes %   4 % (24-48)  


 


Monocytes %   7 % (0-10)  


 


Nucleated Red Blood Cells   1  


 


Platelet Estimate


 


 


 Adequate


(ADEQUATE) 





 


Sodium Level


 


 


 148 mmol/L


(136-145) 





 


Potassium Level


 


 


 3.6 mmol/L


(3.5-5.1) 





 


Chloride Level


 


 


 111 mmol/L


() 





 


Carbon Dioxide Level


 


 


 28 mmol/L


(21-32) 





 


Anion Gap   9 (6-14)  


 


Blood Urea Nitrogen


 


 


 24 mg/dL


(7-20) 





 


Creatinine


 


 


 0.8 mg/dL


(0.6-1.0) 





 


Estimated GFR


(Cockcroft-Gault) 


 


 71.1 


 





 


BUN/Creatinine Ratio   30 (6-20)  


 


Glucose Level


 


 


 120 mg/dL


(70-99) 





 


Calcium Level


 


 


 8.0 mg/dL


(8.5-10.1) 





 


Total Bilirubin


 


 


 0.5 mg/dL


(0.2-1.0) 





 


Aspartate Amino Transf


(AST/SGOT) 


 


 41 U/L (15-37) 


 





 


Alanine Aminotransferase


(ALT/SGPT) 


 


 40 U/L (14-59) 


 





 


Alkaline Phosphatase


 


 


 67 U/L


() 





 


Total Protein


 


 


 5.8 g/dL


(6.4-8.2) 





 


Albumin


 


 


 2.4 g/dL


(3.4-5.0) 





 


Albumin/Globulin Ratio   0.7 (1.0-1.7)  


 


Test


 8/8/20


04:00 


 


 





 


White Blood Count


 10.2 x10^3/uL


(4.0-11.0) 


 


 





 


Red Blood Count


 4.33 x10^6/uL


(3.50-5.40) 


 


 





 


Hemoglobin


 12.7 g/dL


(12.0-15.5) 


 


 





 


Hematocrit


 38.7 %


(36.0-47.0) 


 


 





 


Mean Corpuscular Volume 89 fL ()    


 


Mean Corpuscular Hemoglobin 29 pg (25-35)    


 


Mean Corpuscular Hemoglobin


Concent 33 g/dL


(31-37) 


 


 





 


Red Cell Distribution Width


 13.8 %


(11.5-14.5) 


 


 





 


Platelet Count


 402 x10^3/uL


(140-400) 


 


 





 


Neutrophils (%) (Auto) 91 % (31-73)    


 


Lymphocytes (%) (Auto) 5 % (24-48)    


 


Monocytes (%) (Auto) 4 % (0-9)    


 


Eosinophils (%) (Auto) 0 % (0-3)    


 


Basophils (%) (Auto) 0 % (0-3)    


 


Neutrophils # (Auto)


 9.3 x10^3/uL


(1.8-7.7) 


 


 





 


Lymphocytes # (Auto)


 0.5 x10^3/uL


(1.0-4.8) 


 


 





 


Monocytes # (Auto)


 0.4 x10^3/uL


(0.0-1.1) 


 


 





 


Eosinophils # (Auto)


 0.0 x10^3/uL


(0.0-0.7) 


 


 





 


Basophils # (Auto)


 0.0 x10^3/uL


(0.0-0.2) 


 


 





 


Sodium Level


 145 mmol/L


(136-145) 


 


 





 


Potassium Level


 3.6 mmol/L


(3.5-5.1) 


 


 





 


Chloride Level


 110 mmol/L


() 


 


 





 


Carbon Dioxide Level


 28 mmol/L


(21-32) 


 


 





 


Anion Gap 7 (6-14)    


 


Blood Urea Nitrogen


 22 mg/dL


(7-20) 


 


 





 


Creatinine


 0.6 mg/dL


(0.6-1.0) 


 


 





 


Estimated GFR


(Cockcroft-Gault) 99.1 


 


 


 





 


BUN/Creatinine Ratio 37 (6-20)    


 


Glucose Level


 125 mg/dL


(70-99) 


 


 





 


Calcium Level


 7.8 mg/dL


(8.5-10.1) 


 


 





 


Total Bilirubin


 0.5 mg/dL


(0.2-1.0) 


 


 





 


Aspartate Amino Transf


(AST/SGOT) 33 U/L (15-37) 


 


 


 





 


Alanine Aminotransferase


(ALT/SGPT) 39 U/L (14-59) 


 


 


 





 


Alkaline Phosphatase


 64 U/L


() 


 


 





 


Total Protein


 5.5 g/dL


(6.4-8.2) 


 


 





 


Albumin


 2.3 g/dL


(3.4-5.0) 


 


 





 


Albumin/Globulin Ratio 0.7 (1.0-1.7)    








Laboratory Tests








Test


 8/7/20


07:25 8/7/20


08:00 8/8/20


04:00


 


White Blood Count


 9.9 x10^3/uL


(4.0-11.0) 


 10.2 x10^3/uL


(4.0-11.0)


 


Red Blood Count


 4.35 x10^6/uL


(3.50-5.40) 


 4.33 x10^6/uL


(3.50-5.40)


 


Hemoglobin


 12.9 g/dL


(12.0-15.5) 


 12.7 g/dL


(12.0-15.5)


 


Hematocrit


 38.7 %


(36.0-47.0) 


 38.7 %


(36.0-47.0)


 


Mean Corpuscular Volume 89 fL ()   89 fL () 


 


Mean Corpuscular Hemoglobin 30 pg (25-35)   29 pg (25-35) 


 


Mean Corpuscular Hemoglobin


Concent 33 g/dL


(31-37) 


 33 g/dL


(31-37)


 


Red Cell Distribution Width


 14.2 %


(11.5-14.5) 


 13.8 %


(11.5-14.5)


 


Platelet Count


 377 x10^3/uL


(140-400) 


 402 x10^3/uL


(140-400)


 


Neutrophils (%) (Auto) 88 % (31-73)   91 % (31-73) 


 


Lymphocytes (%) (Auto) 6 % (24-48)   5 % (24-48) 


 


Monocytes (%) (Auto) 6 % (0-9)   4 % (0-9) 


 


Eosinophils (%) (Auto) 0 % (0-3)   0 % (0-3) 


 


Basophils (%) (Auto) 0 % (0-3)   0 % (0-3) 


 


Neutrophils # (Auto)


 8.8 x10^3/uL


(1.8-7.7) 


 9.3 x10^3/uL


(1.8-7.7)


 


Lymphocytes # (Auto)


 0.6 x10^3/uL


(1.0-4.8) 


 0.5 x10^3/uL


(1.0-4.8)


 


Monocytes # (Auto)


 0.6 x10^3/uL


(0.0-1.1) 


 0.4 x10^3/uL


(0.0-1.1)


 


Eosinophils # (Auto)


 0.0 x10^3/uL


(0.0-0.7) 


 0.0 x10^3/uL


(0.0-0.7)


 


Basophils # (Auto)


 0.0 x10^3/uL


(0.0-0.2) 


 0.0 x10^3/uL


(0.0-0.2)


 


Segmented Neutrophils % 87 % (35-66)   


 


Band Neutrophils % 2 % (0-9)   


 


Lymphocytes % 4 % (24-48)   


 


Monocytes % 7 % (0-10)   


 


Nucleated Red Blood Cells 1   


 


Platelet Estimate


 Adequate


(ADEQUATE) 


 





 


Sodium Level


 148 mmol/L


(136-145) 


 145 mmol/L


(136-145)


 


Potassium Level


 3.6 mmol/L


(3.5-5.1) 


 3.6 mmol/L


(3.5-5.1)


 


Chloride Level


 111 mmol/L


() 


 110 mmol/L


()


 


Carbon Dioxide Level


 28 mmol/L


(21-32) 


 28 mmol/L


(21-32)


 


Anion Gap 9 (6-14)   7 (6-14) 


 


Blood Urea Nitrogen


 24 mg/dL


(7-20) 


 22 mg/dL


(7-20)


 


Creatinine


 0.8 mg/dL


(0.6-1.0) 


 0.6 mg/dL


(0.6-1.0)


 


Estimated GFR


(Cockcroft-Gault) 71.1 


 


 99.1 





 


BUN/Creatinine Ratio 30 (6-20)   37 (6-20) 


 


Glucose Level


 120 mg/dL


(70-99) 


 125 mg/dL


(70-99)


 


Calcium Level


 8.0 mg/dL


(8.5-10.1) 


 7.8 mg/dL


(8.5-10.1)


 


Total Bilirubin


 0.5 mg/dL


(0.2-1.0) 


 0.5 mg/dL


(0.2-1.0)


 


Aspartate Amino Transf


(AST/SGOT) 41 U/L (15-37) 


 


 33 U/L (15-37) 





 


Alanine Aminotransferase


(ALT/SGPT) 40 U/L (14-59) 


 


 39 U/L (14-59) 





 


Alkaline Phosphatase


 67 U/L


() 


 64 U/L


()


 


Total Protein


 5.8 g/dL


(6.4-8.2) 


 5.5 g/dL


(6.4-8.2)


 


Albumin


 2.4 g/dL


(3.4-5.0) 


 2.3 g/dL


(3.4-5.0)


 


Albumin/Globulin Ratio 0.7 (1.0-1.7)   0.7 (1.0-1.7) 


 


O2 Saturation  92 % (92-99)  


 


Arterial Blood pH


 


 7.44


(7.35-7.45) 





 


Arterial Blood pCO2 at


Patient Temp 


 37 mmHg


(35-46) 





 


Arterial Blood pO2 at Patient


Temp 


 63 mmHg


() 





 


Arterial Blood HCO3


 


 25 mmol/L


(21-28) 





 


Arterial Blood Base Excess


 


 1 mmol/L


(-3-3) 





 


FiO2  Bipap 90%  








Comments


CXR, reviewed 


 


Bilateral pneumonia, more confluent in the right upper lobe.





Impression


.


IMPRESSION:


1.  Acute hypoxic respiratory failure secondary to COVID-19 pneumonia and acute


lung injury.


2.  No significant tobacco history.


3.  Abnormal chest x-ray consistent with COVID-19 pneumonia.


4.  Mildly increased liver function tests.


5. hypoxemia





Plan


.


RECOMMENDATIONS:


1.  Continue present BIPAP, setting reviewed, titrate FiO2 to keep sat 92%. 

vapotherm as tolerated


2.  Monitor the course of treatment.  


3.  Cont. antibiotics.


4. change solumedrol to 40 bid 


5.  Monitor for inflammatory markers, D-Dimer 0.87


6.  High dose DVT prophylaxis.


7.  S/P convalescent plasma on 8/5/2020 and remdesivir.


8.  Discussed with RN and RT.  





critically ill 


Critical care time 30 minutes, which includes review of the chart, labs and


data.  no overlap











GORAN GONZALEZ MD                Aug 8, 2020 06:41

## 2020-08-08 NOTE — PDOC
[facility] PROGRESS NOTE


Date of Service


DOS:


DATE: 8/8/20 


TIME: 10:19





Chief Complaint


Chief Complaint


A/P:


Acute hypoxic respiratory failuresecondary to SARSCo.2 pneumonia.  Continue 

supportive therapy, steroids room to severe protocol initiated on 8 4 will 

continue here.  Consult ID and pulmonology


SARS- COV2 pneumonia -continue IV Solu-Medrol wean O2 as tolerated I discussed 

with pulmonology to move to a negative pressure room if her desaturations worsen

and she may need BiPAP.. Will try experimental drug to severe and convalescent 

plasma therapies with ID and pulmonology.


Hypokalemiawill replace, check magnesium level


Diarrhealikely COVID-19 related.  Will check for C. difficile per protocol if 

more than 3 bowel movements in a day


Transaminitis also likely related COVID-19, will monitor


Severe protein calorie malnutritionlikely related to above





FEN - General diet


PPX - lovenox


FULL CODE


Dispo - ICU for above


CC time 38 minutes





History of Present Illness


History of Present Illness


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 


On 7/30/2020 she began having symptoms of diarrhea and shortness of breath and 

went to the ED to have COVID-19 testing, she was notified of the results on 

7/31/2020 and asked to quarantine at home for 14 days.  Over the course the next

5 days she became progressively more weak had fever and chills and began having 

myalgias and worsening diarrhea with little bit of abdominal pain.  She returned

to the ED on 8/4/2020 at [facility] and was found to be hypoxic.


ABG on 8/4/2020 7.472/30 5.1/94.3 with HCO3 of 26.2 on nonrebreather 10 L labs 

on 8 4 sodium 135, K3.2, chloride 99, CO2 25, BUN 17, creatinine 1, glucose 115,

lactate 1.9, calcium 8, ferritin 1491, bilirubin 0.4, , ALT 5 8, AL K 

phosphatase 62 troponin negative, .6, total protein 6.3, albumin 2.7 

prealbumin 9, lipase 185, B12 995, procalcitonin 0.40, TSH 1.427, d-dimer 1.16, 

WBC 7.1, Hb 14.1, platelets 302 chest x-ray on 8 4 read as worsening 

interstitial and airspace opacities in the right upper lobe and left lung base 

and worsening interstitial opacities in the right lung base compatible with 

multifocal pneumonia


EKG appears normal sinus rhythm with heart rate approximately 85 bpm with normal

axis some inferior T wave changes that are nonspecific no ST segment changes.





8/6: Seen in our ICU on Vapotherm.  She is still complaining of weakness and 

diarrhea to me, 2 BM this morning. Afebrile. Still with cough today. O2 53 on 

Vapotherm. Remdesivir and convalescent FFP given


8/7: O2 63 on ABG this morning after BIPAP overnight. She feels a bit improved, 

appetite improved. No diarrhea yet today. Still weak with myalgias.





Less short of breath, on BiPAP with FiO2 70%.  No chest pain or wheezing, no 

fevers.  Still with some loose stools.  LFTs normal today.  WBC 10.2, Hb 12.7, 

platelets 4 2,  5K3.6 BUN 22, CR 0.6 ABG 7.48, 35, 69 BiPAP 70%.





Vitals/I&O


Vitals/I&O:





                                   Vital Signs








  Date Time  Temp Pulse Resp B/P (MAP) Pulse Ox O2 Delivery O2 Flow Rate FiO2


 


8/8/20 09:00  57 26 125/66 (85) 97 BiPAP/CPAP  


 


8/8/20 08:00 98.0       





 98.0       














                                    I & O   


 


 8/7/20 8/7/20 8/8/20





 15:00 23:00 07:00


 


Intake Total 795 ml 250 ml 


 


Output Total 200 ml  


 


Balance 595 ml 250 ml 











Physical Exam


Physical Exam:


GENERAL:  Propped up in bed, alert, calm, on BiPAP


HEENT:  PERRL. Oral cavity dry 


NECK:  Supple


LUNGS:  Diminished aeration, no accessory muscle use 


HEART:  S1, S2 regular.


ABDOMEN:  Soft and nontender 


EXTREMITIES:  No edema or cyanosis. SCDs bilaterally 


SKIN:  No signs of rash.


NEUROLOGIC:  Alert, awake and appropriate.  No focal neurologic deficit.


PIV


General:  Alert, Oriented X3, Cooperative, moderate distress


Heart:  Other (Rhonchi bilaterally)


Abdomen:  Normal bowel sounds, Soft, No tenderness, No hepatosplenomegaly, No 

masses


Extremities:  No clubbing, No cyanosis, No edema, Normal pulses, No 

tenderness/swelling


Skin:  No rashes, No breakdown, No significant lesion





Labs


Labs:





Laboratory Tests








Test


 8/8/20


04:00 8/8/20


08:35


 


White Blood Count


 10.2 x10^3/uL


(4.0-11.0) 





 


Red Blood Count


 4.33 x10^6/uL


(3.50-5.40) 





 


Hemoglobin


 12.7 g/dL


(12.0-15.5) 





 


Hematocrit


 38.7 %


(36.0-47.0) 





 


Mean Corpuscular Volume 89 fL ()  


 


Mean Corpuscular Hemoglobin 29 pg (25-35)  


 


Mean Corpuscular Hemoglobin


Concent 33 g/dL


(31-37) 





 


Red Cell Distribution Width


 13.8 %


(11.5-14.5) 





 


Platelet Count


 402 x10^3/uL


(140-400) 





 


Neutrophils (%) (Auto) 91 % (31-73)  


 


Lymphocytes (%) (Auto) 5 % (24-48)  


 


Monocytes (%) (Auto) 4 % (0-9)  


 


Eosinophils (%) (Auto) 0 % (0-3)  


 


Basophils (%) (Auto) 0 % (0-3)  


 


Neutrophils # (Auto)


 9.3 x10^3/uL


(1.8-7.7) 





 


Lymphocytes # (Auto)


 0.5 x10^3/uL


(1.0-4.8) 





 


Monocytes # (Auto)


 0.4 x10^3/uL


(0.0-1.1) 





 


Eosinophils # (Auto)


 0.0 x10^3/uL


(0.0-0.7) 





 


Basophils # (Auto)


 0.0 x10^3/uL


(0.0-0.2) 





 


Sodium Level


 145 mmol/L


(136-145) 





 


Potassium Level


 3.6 mmol/L


(3.5-5.1) 





 


Chloride Level


 110 mmol/L


() 





 


Carbon Dioxide Level


 28 mmol/L


(21-32) 





 


Anion Gap 7 (6-14)  


 


Blood Urea Nitrogen


 22 mg/dL


(7-20) 





 


Creatinine


 0.6 mg/dL


(0.6-1.0) 





 


Estimated GFR


(Cockcroft-Gault) 99.1 


 





 


BUN/Creatinine Ratio 37 (6-20)  


 


Glucose Level


 125 mg/dL


(70-99) 





 


Calcium Level


 7.8 mg/dL


(8.5-10.1) 





 


Total Bilirubin


 0.5 mg/dL


(0.2-1.0) 





 


Aspartate Amino Transf


(AST/SGOT) 33 U/L (15-37) 


 





 


Alanine Aminotransferase


(ALT/SGPT) 39 U/L (14-59) 


 





 


Alkaline Phosphatase


 64 U/L


() 





 


Total Protein


 5.5 g/dL


(6.4-8.2) 





 


Albumin


 2.3 g/dL


(3.4-5.0) 





 


Albumin/Globulin Ratio 0.7 (1.0-1.7)  


 


O2 Saturation  94 % (92-99) 


 


Arterial Blood pH


 


 7.48


(7.35-7.45)


 


Arterial Blood pCO2 at


Patient Temp 


 35 mmHg


(35-46)


 


Arterial Blood pO2 at Patient


Temp 


 69 mmHg


()


 


Arterial Blood HCO3


 


 25 mmol/L


(21-28)


 


Arterial Blood Base Excess


 


 2 mmol/L


(-3-3)


 


FiO2  70 











Comment


Review of Relevant


I have reviewed the following items anum (where applicable) has been applied.





Justicifation of Admission Dx:


Justifications for Admission:


Justification of Admission Dx:  Yes


Respiratory Failure:  Severe Resp Distress











BLAIR ERICKSON MD         Aug 8, 2020 10:21

## 2020-08-08 NOTE — PDOC
Infectious Disease Note


Subjective


Subjective


Less SOA on BiPAP FiO2 70%


Denies chest discomfort or wheezing


Denies fever/chills/body aches


Denies N/V


Loose stools





ROS


ROS


as mentioned above





Vital Sign


Vital Signs





Vital Signs








  Date Time  Temp Pulse Resp B/P (MAP) Pulse Ox O2 Delivery O2 Flow Rate FiO2


 


8/8/20 08:00 98.0 76 34 111/76 (88) 98 BiPAP/CPAP  





 98.0       











Physical Exam


PHYSICAL EXAM


GENERAL:  Propped up in bed, alert, calm, on BiPAP


HEENT:  PERRL. Oral cavity dry 


NECK:  Supple


LUNGS:  Diminished aeration, no accessory muscle use 


HEART:  S1, S2 regular.


ABDOMEN:  Soft and nontender 


EXTREMITIES:  No edema or cyanosis. SCDs bilaterally 


SKIN:  No signs of rash.


NEUROLOGIC:  Alert, awake and appropriate.  No focal neurologic deficit.


PIV





Labs


Lab





Laboratory Tests








Test


 8/8/20


04:00


 


White Blood Count


 10.2 x10^3/uL


(4.0-11.0)


 


Red Blood Count


 4.33 x10^6/uL


(3.50-5.40)


 


Hemoglobin


 12.7 g/dL


(12.0-15.5)


 


Hematocrit


 38.7 %


(36.0-47.0)


 


Mean Corpuscular Volume 89 fL () 


 


Mean Corpuscular Hemoglobin 29 pg (25-35) 


 


Mean Corpuscular Hemoglobin


Concent 33 g/dL


(31-37)


 


Red Cell Distribution Width


 13.8 %


(11.5-14.5)


 


Platelet Count


 402 x10^3/uL


(140-400)


 


Neutrophils (%) (Auto) 91 % (31-73) 


 


Lymphocytes (%) (Auto) 5 % (24-48) 


 


Monocytes (%) (Auto) 4 % (0-9) 


 


Eosinophils (%) (Auto) 0 % (0-3) 


 


Basophils (%) (Auto) 0 % (0-3) 


 


Neutrophils # (Auto)


 9.3 x10^3/uL


(1.8-7.7)


 


Lymphocytes # (Auto)


 0.5 x10^3/uL


(1.0-4.8)


 


Monocytes # (Auto)


 0.4 x10^3/uL


(0.0-1.1)


 


Eosinophils # (Auto)


 0.0 x10^3/uL


(0.0-0.7)


 


Basophils # (Auto)


 0.0 x10^3/uL


(0.0-0.2)


 


Sodium Level


 145 mmol/L


(136-145)


 


Potassium Level


 3.6 mmol/L


(3.5-5.1)


 


Chloride Level


 110 mmol/L


()


 


Carbon Dioxide Level


 28 mmol/L


(21-32)


 


Anion Gap 7 (6-14) 


 


Blood Urea Nitrogen


 22 mg/dL


(7-20)


 


Creatinine


 0.6 mg/dL


(0.6-1.0)


 


Estimated GFR


(Cockcroft-Gault) 99.1 





 


BUN/Creatinine Ratio 37 (6-20) 


 


Glucose Level


 125 mg/dL


(70-99)


 


Calcium Level


 7.8 mg/dL


(8.5-10.1)


 


Total Bilirubin


 0.5 mg/dL


(0.2-1.0)


 


Aspartate Amino Transf


(AST/SGOT) 33 U/L (15-37) 





 


Alanine Aminotransferase


(ALT/SGPT) 39 U/L (14-59) 





 


Alkaline Phosphatase


 64 U/L


()


 


Total Protein


 5.5 g/dL


(6.4-8.2)


 


Albumin


 2.3 g/dL


(3.4-5.0)


 


Albumin/Globulin Ratio 0.7 (1.0-1.7) 





8/5. Chest x-ray:


IMPRESSION:


Bilateral pneumonia, more confluent in the right upper lobe.





Objective


Assessment


COVID-19 positive. s/p convalescent plasma 


Bilateral pulmonary infiltrate.


Hypoxemia. On BiPAP 


Obesity.





Plan


Plan of Care


Remdesivir, steroids


Convalescent plasma given


Continue Zosyn (started 8/5)


Maintain aspiration precautions


Airborne isolation  for positive COVID





Critically ill


Attending Co-Sign


The patient was seen and interviewed as well as examined at the bedside. The 

chart was reviewed. The case was discussed. Agree with the plan of care.











KARLA QUINN         Aug 8, 2020 08:47


LING AYALA MD                Aug 8, 2020 12:34

## 2020-08-09 VITALS — DIASTOLIC BLOOD PRESSURE: 54 MMHG | SYSTOLIC BLOOD PRESSURE: 116 MMHG

## 2020-08-09 VITALS — SYSTOLIC BLOOD PRESSURE: 117 MMHG | DIASTOLIC BLOOD PRESSURE: 60 MMHG

## 2020-08-09 VITALS — SYSTOLIC BLOOD PRESSURE: 106 MMHG | DIASTOLIC BLOOD PRESSURE: 60 MMHG

## 2020-08-09 VITALS — SYSTOLIC BLOOD PRESSURE: 118 MMHG | DIASTOLIC BLOOD PRESSURE: 56 MMHG

## 2020-08-09 VITALS — SYSTOLIC BLOOD PRESSURE: 134 MMHG | DIASTOLIC BLOOD PRESSURE: 72 MMHG

## 2020-08-09 VITALS — DIASTOLIC BLOOD PRESSURE: 58 MMHG | SYSTOLIC BLOOD PRESSURE: 124 MMHG

## 2020-08-09 VITALS — DIASTOLIC BLOOD PRESSURE: 64 MMHG | SYSTOLIC BLOOD PRESSURE: 143 MMHG

## 2020-08-09 VITALS — SYSTOLIC BLOOD PRESSURE: 121 MMHG | DIASTOLIC BLOOD PRESSURE: 49 MMHG

## 2020-08-09 VITALS — DIASTOLIC BLOOD PRESSURE: 59 MMHG | SYSTOLIC BLOOD PRESSURE: 145 MMHG

## 2020-08-09 VITALS — SYSTOLIC BLOOD PRESSURE: 118 MMHG | DIASTOLIC BLOOD PRESSURE: 73 MMHG

## 2020-08-09 VITALS — SYSTOLIC BLOOD PRESSURE: 133 MMHG | DIASTOLIC BLOOD PRESSURE: 65 MMHG

## 2020-08-09 VITALS — DIASTOLIC BLOOD PRESSURE: 64 MMHG | SYSTOLIC BLOOD PRESSURE: 133 MMHG

## 2020-08-09 VITALS — SYSTOLIC BLOOD PRESSURE: 121 MMHG | DIASTOLIC BLOOD PRESSURE: 54 MMHG

## 2020-08-09 VITALS — DIASTOLIC BLOOD PRESSURE: 54 MMHG | SYSTOLIC BLOOD PRESSURE: 122 MMHG

## 2020-08-09 VITALS — DIASTOLIC BLOOD PRESSURE: 66 MMHG | SYSTOLIC BLOOD PRESSURE: 144 MMHG

## 2020-08-09 VITALS — SYSTOLIC BLOOD PRESSURE: 143 MMHG | DIASTOLIC BLOOD PRESSURE: 64 MMHG

## 2020-08-09 VITALS — SYSTOLIC BLOOD PRESSURE: 127 MMHG | DIASTOLIC BLOOD PRESSURE: 67 MMHG

## 2020-08-09 VITALS — DIASTOLIC BLOOD PRESSURE: 69 MMHG | SYSTOLIC BLOOD PRESSURE: 114 MMHG

## 2020-08-09 VITALS — DIASTOLIC BLOOD PRESSURE: 60 MMHG | SYSTOLIC BLOOD PRESSURE: 119 MMHG

## 2020-08-09 VITALS — SYSTOLIC BLOOD PRESSURE: 132 MMHG | DIASTOLIC BLOOD PRESSURE: 61 MMHG

## 2020-08-09 LAB
ALBUMIN SERPL-MCNC: 2 G/DL (ref 3.4–5)
ALBUMIN/GLOB SERPL: 0.6 {RATIO} (ref 1–1.7)
ALP SERPL-CCNC: 60 U/L (ref 46–116)
ALT SERPL-CCNC: 34 U/L (ref 14–59)
ANION GAP SERPL CALC-SCNC: 8 MMOL/L (ref 6–14)
APTT PPP: YELLOW S
AST SERPL-CCNC: 31 U/L (ref 15–37)
BACTERIA #/AREA URNS HPF: (no result) /HPF
BASOPHILS # BLD AUTO: 0 X10^3/UL (ref 0–0.2)
BASOPHILS NFR BLD: 0 % (ref 0–3)
BILIRUB SERPL-MCNC: 0.7 MG/DL (ref 0.2–1)
BILIRUB UR QL STRIP: NEGATIVE
BUN SERPL-MCNC: 19 MG/DL (ref 7–20)
BUN/CREAT SERPL: 32 (ref 6–20)
CALCIUM SERPL-MCNC: 7.8 MG/DL (ref 8.5–10.1)
CHLORIDE SERPL-SCNC: 109 MMOL/L (ref 98–107)
CO2 SERPL-SCNC: 28 MMOL/L (ref 21–32)
CREAT SERPL-MCNC: 0.6 MG/DL (ref 0.6–1)
EOSINOPHIL NFR BLD: 0 % (ref 0–3)
EOSINOPHIL NFR BLD: 0 X10^3/UL (ref 0–0.7)
ERYTHROCYTE [DISTWIDTH] IN BLOOD BY AUTOMATED COUNT: 13.7 % (ref 11.5–14.5)
FIBRINOGEN PPP-MCNC: CLEAR MG/DL
GFR SERPLBLD BASED ON 1.73 SQ M-ARVRAT: 99.1 ML/MIN
GLOBULIN SER-MCNC: 3.2 G/DL (ref 2.2–3.8)
GLUCOSE SERPL-MCNC: 78 MG/DL (ref 70–99)
HCT VFR BLD CALC: 38 % (ref 36–47)
HGB BLD-MCNC: 12.4 G/DL (ref 12–15.5)
LYMPHOCYTES # BLD: 0.8 X10^3/UL (ref 1–4.8)
LYMPHOCYTES NFR BLD AUTO: 7 % (ref 24–48)
MCH RBC QN AUTO: 29 PG (ref 25–35)
MCHC RBC AUTO-ENTMCNC: 33 G/DL (ref 31–37)
MCV RBC AUTO: 89 FL (ref 79–100)
MONO #: 0.5 X10^3/UL (ref 0–1.1)
MONOCYTES NFR BLD: 4 % (ref 0–9)
NEUT #: 10.1 X10^3/UL (ref 1.8–7.7)
NEUTROPHILS NFR BLD AUTO: 89 % (ref 31–73)
NITRITE UR QL STRIP: NEGATIVE
PH UR STRIP: 5.5 [PH]
PLATELET # BLD AUTO: 374 X10^3/UL (ref 140–400)
POTASSIUM SERPL-SCNC: 3.3 MMOL/L (ref 3.5–5.1)
PROT SERPL-MCNC: 5.2 G/DL (ref 6.4–8.2)
PROT UR STRIP-MCNC: 30 MG/DL
RBC # BLD AUTO: 4.3 X10^6/UL (ref 3.5–5.4)
RBC #/AREA URNS HPF: 0 /HPF (ref 0–2)
SODIUM SERPL-SCNC: 145 MMOL/L (ref 136–145)
SQUAMOUS #/AREA URNS LPF: (no result) /LPF
UROBILINOGEN UR-MCNC: 0.2 MG/DL
WBC # BLD AUTO: 11.4 X10^3/UL (ref 4–11)
WBC #/AREA URNS HPF: (no result) /HPF (ref 0–4)
YEAST #/AREA URNS HPF: PRESENT /HPF

## 2020-08-09 RX ADMIN — LINEZOLID SCH MG: 600 TABLET, FILM COATED ORAL at 20:56

## 2020-08-09 RX ADMIN — ZINC SULFATE CAP 220 MG (50 MG ELEMENTAL ZN) SCH MG: 220 (50 ZN) CAP at 07:59

## 2020-08-09 RX ADMIN — WATER PRN ML: 1 INJECTION INTRAVENOUS at 13:07

## 2020-08-09 RX ADMIN — ENOXAPARIN SODIUM SCH MG: 40 INJECTION SUBCUTANEOUS at 20:56

## 2020-08-09 RX ADMIN — POTASSIUM CHLORIDE SCH MLS/HR: 7.46 INJECTION, SOLUTION INTRAVENOUS at 09:42

## 2020-08-09 RX ADMIN — Medication SCH CAP: at 20:56

## 2020-08-09 RX ADMIN — LINEZOLID SCH MG: 600 TABLET, FILM COATED ORAL at 11:11

## 2020-08-09 RX ADMIN — PIPERACILLIN SODIUM AND TAZOBACTAM SODIUM SCH MLS/HR: 3; .375 INJECTION, POWDER, LYOPHILIZED, FOR SOLUTION INTRAVENOUS at 23:38

## 2020-08-09 RX ADMIN — POTASSIUM CHLORIDE SCH MLS/HR: 7.46 INJECTION, SOLUTION INTRAVENOUS at 10:54

## 2020-08-09 RX ADMIN — PIPERACILLIN SODIUM AND TAZOBACTAM SODIUM SCH MLS/HR: 3; .375 INJECTION, POWDER, LYOPHILIZED, FOR SOLUTION INTRAVENOUS at 07:35

## 2020-08-09 RX ADMIN — PIPERACILLIN SODIUM AND TAZOBACTAM SODIUM SCH MLS/HR: 3; .375 INJECTION, POWDER, LYOPHILIZED, FOR SOLUTION INTRAVENOUS at 03:21

## 2020-08-09 RX ADMIN — PIPERACILLIN SODIUM AND TAZOBACTAM SODIUM SCH MLS/HR: 3; .375 INJECTION, POWDER, LYOPHILIZED, FOR SOLUTION INTRAVENOUS at 17:16

## 2020-08-09 RX ADMIN — Medication SCH CAP: at 07:59

## 2020-08-09 RX ADMIN — PIPERACILLIN SODIUM AND TAZOBACTAM SODIUM SCH MLS/HR: 3; .375 INJECTION, POWDER, LYOPHILIZED, FOR SOLUTION INTRAVENOUS at 12:51

## 2020-08-09 RX ADMIN — METHYLPREDNISOLONE SODIUM SUCCINATE SCH MG: 40 INJECTION, POWDER, FOR SOLUTION INTRAMUSCULAR; INTRAVENOUS at 08:00

## 2020-08-09 RX ADMIN — ENOXAPARIN SODIUM SCH MG: 40 INJECTION SUBCUTANEOUS at 07:59

## 2020-08-09 RX ADMIN — METHYLPREDNISOLONE SODIUM SUCCINATE SCH MG: 40 INJECTION, POWDER, FOR SOLUTION INTRAMUSCULAR; INTRAVENOUS at 20:56

## 2020-08-09 NOTE — PDOC
PULMONARY PROGRESS NOTES


DATE: 8/9/20 


TIME: 06:34


Subjective


on BIPAP, 50%FIO2, sob better, has occ cough, no pain, has fever


gets sob easily


Vitals





Vital Signs








  Date Time  Temp Pulse Resp B/P (MAP) Pulse Ox O2 Delivery O2 Flow Rate FiO2


 


8/9/20 06:00  76 21  96 BiPAP/CPAP  


 


8/9/20 04:00 99.5       





 99.5       








Comments


on bipap 


appears comfortable


no paradoxical abd  motion


no audible wheezing


alert


RRR, 


no edema 


No rash


ROS:  No Nausea, No Chest Pain, No Abdominal Pain, No Increase Cough


Labs





Laboratory Tests








Test


 8/7/20


07:25 8/7/20


08:00 8/8/20


04:00 8/8/20


08:35


 


White Blood Count


 9.9 x10^3/uL


(4.0-11.0) 


 10.2 x10^3/uL


(4.0-11.0) 





 


Red Blood Count


 4.35 x10^6/uL


(3.50-5.40) 


 4.33 x10^6/uL


(3.50-5.40) 





 


Hemoglobin


 12.9 g/dL


(12.0-15.5) 


 12.7 g/dL


(12.0-15.5) 





 


Hematocrit


 38.7 %


(36.0-47.0) 


 38.7 %


(36.0-47.0) 





 


Mean Corpuscular Volume 89 fL ()   89 fL ()  


 


Mean Corpuscular Hemoglobin 30 pg (25-35)   29 pg (25-35)  


 


Mean Corpuscular Hemoglobin


Concent 33 g/dL


(31-37) 


 33 g/dL


(31-37) 





 


Red Cell Distribution Width


 14.2 %


(11.5-14.5) 


 13.8 %


(11.5-14.5) 





 


Platelet Count


 377 x10^3/uL


(140-400) 


 402 x10^3/uL


(140-400) 





 


Neutrophils (%) (Auto) 88 % (31-73)   91 % (31-73)  


 


Lymphocytes (%) (Auto) 6 % (24-48)   5 % (24-48)  


 


Monocytes (%) (Auto) 6 % (0-9)   4 % (0-9)  


 


Eosinophils (%) (Auto) 0 % (0-3)   0 % (0-3)  


 


Basophils (%) (Auto) 0 % (0-3)   0 % (0-3)  


 


Neutrophils # (Auto)


 8.8 x10^3/uL


(1.8-7.7) 


 9.3 x10^3/uL


(1.8-7.7) 





 


Lymphocytes # (Auto)


 0.6 x10^3/uL


(1.0-4.8) 


 0.5 x10^3/uL


(1.0-4.8) 





 


Monocytes # (Auto)


 0.6 x10^3/uL


(0.0-1.1) 


 0.4 x10^3/uL


(0.0-1.1) 





 


Eosinophils # (Auto)


 0.0 x10^3/uL


(0.0-0.7) 


 0.0 x10^3/uL


(0.0-0.7) 





 


Basophils # (Auto)


 0.0 x10^3/uL


(0.0-0.2) 


 0.0 x10^3/uL


(0.0-0.2) 





 


Segmented Neutrophils % 87 % (35-66)    


 


Band Neutrophils % 2 % (0-9)    


 


Lymphocytes % 4 % (24-48)    


 


Monocytes % 7 % (0-10)    


 


Nucleated Red Blood Cells 1    


 


Platelet Estimate


 Adequate


(ADEQUATE) 


 


 





 


Sodium Level


 148 mmol/L


(136-145) 


 145 mmol/L


(136-145) 





 


Potassium Level


 3.6 mmol/L


(3.5-5.1) 


 3.6 mmol/L


(3.5-5.1) 





 


Chloride Level


 111 mmol/L


() 


 110 mmol/L


() 





 


Carbon Dioxide Level


 28 mmol/L


(21-32) 


 28 mmol/L


(21-32) 





 


Anion Gap 9 (6-14)   7 (6-14)  


 


Blood Urea Nitrogen


 24 mg/dL


(7-20) 


 22 mg/dL


(7-20) 





 


Creatinine


 0.8 mg/dL


(0.6-1.0) 


 0.6 mg/dL


(0.6-1.0) 





 


Estimated GFR


(Cockcroft-Gault) 71.1 


 


 99.1 


 





 


BUN/Creatinine Ratio 30 (6-20)   37 (6-20)  


 


Glucose Level


 120 mg/dL


(70-99) 


 125 mg/dL


(70-99) 





 


Calcium Level


 8.0 mg/dL


(8.5-10.1) 


 7.8 mg/dL


(8.5-10.1) 





 


Total Bilirubin


 0.5 mg/dL


(0.2-1.0) 


 0.5 mg/dL


(0.2-1.0) 





 


Aspartate Amino Transf


(AST/SGOT) 41 U/L (15-37) 


 


 33 U/L (15-37) 


 





 


Alanine Aminotransferase


(ALT/SGPT) 40 U/L (14-59) 


 


 39 U/L (14-59) 


 





 


Alkaline Phosphatase


 67 U/L


() 


 64 U/L


() 





 


Total Protein


 5.8 g/dL


(6.4-8.2) 


 5.5 g/dL


(6.4-8.2) 





 


Albumin


 2.4 g/dL


(3.4-5.0) 


 2.3 g/dL


(3.4-5.0) 





 


Albumin/Globulin Ratio 0.7 (1.0-1.7)   0.7 (1.0-1.7)  


 


O2 Saturation  92 % (92-99)   94 % (92-99) 


 


Arterial Blood pH


 


 7.44


(7.35-7.45) 


 7.48


(7.35-7.45)


 


Arterial Blood pCO2 at


Patient Temp 


 37 mmHg


(35-46) 


 35 mmHg


(35-46)


 


Arterial Blood pO2 at Patient


Temp 


 63 mmHg


() 


 69 mmHg


()


 


Arterial Blood HCO3


 


 25 mmol/L


(21-28) 


 25 mmol/L


(21-28)


 


Arterial Blood Base Excess


 


 1 mmol/L


(-3-3) 


 2 mmol/L


(-3-3)


 


FiO2  Bipap 90%   70 


 


Test


 8/9/20


04:30 


 


 





 


White Blood Count


 11.4 x10^3/uL


(4.0-11.0) 


 


 





 


Red Blood Count


 4.30 x10^6/uL


(3.50-5.40) 


 


 





 


Hemoglobin


 12.4 g/dL


(12.0-15.5) 


 


 





 


Hematocrit


 38.0 %


(36.0-47.0) 


 


 





 


Mean Corpuscular Volume 89 fL ()    


 


Mean Corpuscular Hemoglobin 29 pg (25-35)    


 


Mean Corpuscular Hemoglobin


Concent 33 g/dL


(31-37) 


 


 





 


Red Cell Distribution Width


 13.7 %


(11.5-14.5) 


 


 





 


Platelet Count


 374 x10^3/uL


(140-400) 


 


 





 


Neutrophils (%) (Auto) 89 % (31-73)    


 


Lymphocytes (%) (Auto) 7 % (24-48)    


 


Monocytes (%) (Auto) 4 % (0-9)    


 


Eosinophils (%) (Auto) 0 % (0-3)    


 


Basophils (%) (Auto) 0 % (0-3)    


 


Neutrophils # (Auto)


 10.1 x10^3/uL


(1.8-7.7) 


 


 





 


Lymphocytes # (Auto)


 0.8 x10^3/uL


(1.0-4.8) 


 


 





 


Monocytes # (Auto)


 0.5 x10^3/uL


(0.0-1.1) 


 


 





 


Eosinophils # (Auto)


 0.0 x10^3/uL


(0.0-0.7) 


 


 





 


Basophils # (Auto)


 0.0 x10^3/uL


(0.0-0.2) 


 


 





 


Sodium Level


 145 mmol/L


(136-145) 


 


 





 


Potassium Level


 3.3 mmol/L


(3.5-5.1) 


 


 





 


Chloride Level


 109 mmol/L


() 


 


 





 


Carbon Dioxide Level


 28 mmol/L


(21-32) 


 


 





 


Anion Gap 8 (6-14)    


 


Blood Urea Nitrogen


 19 mg/dL


(7-20) 


 


 





 


Creatinine


 0.6 mg/dL


(0.6-1.0) 


 


 





 


Estimated GFR


(Cockcroft-Gault) 99.1 


 


 


 





 


BUN/Creatinine Ratio 32 (6-20)    


 


Glucose Level


 78 mg/dL


(70-99) 


 


 





 


Calcium Level


 7.8 mg/dL


(8.5-10.1) 


 


 





 


Total Bilirubin


 0.7 mg/dL


(0.2-1.0) 


 


 





 


Aspartate Amino Transf


(AST/SGOT) 31 U/L (15-37) 


 


 


 





 


Alanine Aminotransferase


(ALT/SGPT) 34 U/L (14-59) 


 


 


 





 


Alkaline Phosphatase


 60 U/L


() 


 


 





 


Total Protein


 5.2 g/dL


(6.4-8.2) 


 


 





 


Albumin


 2.0 g/dL


(3.4-5.0) 


 


 





 


Albumin/Globulin Ratio 0.6 (1.0-1.7)    








Laboratory Tests








Test


 8/8/20


08:35 8/9/20


04:30


 


O2 Saturation 94 % (92-99)  


 


Arterial Blood pH


 7.48


(7.35-7.45) 





 


Arterial Blood pCO2 at


Patient Temp 35 mmHg


(35-46) 





 


Arterial Blood pO2 at Patient


Temp 69 mmHg


() 





 


Arterial Blood HCO3


 25 mmol/L


(21-28) 





 


Arterial Blood Base Excess


 2 mmol/L


(-3-3) 





 


FiO2 70  


 


White Blood Count


 


 11.4 x10^3/uL


(4.0-11.0)


 


Red Blood Count


 


 4.30 x10^6/uL


(3.50-5.40)


 


Hemoglobin


 


 12.4 g/dL


(12.0-15.5)


 


Hematocrit


 


 38.0 %


(36.0-47.0)


 


Mean Corpuscular Volume  89 fL () 


 


Mean Corpuscular Hemoglobin  29 pg (25-35) 


 


Mean Corpuscular Hemoglobin


Concent 


 33 g/dL


(31-37)


 


Red Cell Distribution Width


 


 13.7 %


(11.5-14.5)


 


Platelet Count


 


 374 x10^3/uL


(140-400)


 


Neutrophils (%) (Auto)  89 % (31-73) 


 


Lymphocytes (%) (Auto)  7 % (24-48) 


 


Monocytes (%) (Auto)  4 % (0-9) 


 


Eosinophils (%) (Auto)  0 % (0-3) 


 


Basophils (%) (Auto)  0 % (0-3) 


 


Neutrophils # (Auto)


 


 10.1 x10^3/uL


(1.8-7.7)


 


Lymphocytes # (Auto)


 


 0.8 x10^3/uL


(1.0-4.8)


 


Monocytes # (Auto)


 


 0.5 x10^3/uL


(0.0-1.1)


 


Eosinophils # (Auto)


 


 0.0 x10^3/uL


(0.0-0.7)


 


Basophils # (Auto)


 


 0.0 x10^3/uL


(0.0-0.2)


 


Sodium Level


 


 145 mmol/L


(136-145)


 


Potassium Level


 


 3.3 mmol/L


(3.5-5.1)


 


Chloride Level


 


 109 mmol/L


()


 


Carbon Dioxide Level


 


 28 mmol/L


(21-32)


 


Anion Gap  8 (6-14) 


 


Blood Urea Nitrogen


 


 19 mg/dL


(7-20)


 


Creatinine


 


 0.6 mg/dL


(0.6-1.0)


 


Estimated GFR


(Cockcroft-Gault) 


 99.1 





 


BUN/Creatinine Ratio  32 (6-20) 


 


Glucose Level


 


 78 mg/dL


(70-99)


 


Calcium Level


 


 7.8 mg/dL


(8.5-10.1)


 


Total Bilirubin


 


 0.7 mg/dL


(0.2-1.0)


 


Aspartate Amino Transf


(AST/SGOT) 


 31 U/L (15-37) 





 


Alanine Aminotransferase


(ALT/SGPT) 


 34 U/L (14-59) 





 


Alkaline Phosphatase


 


 60 U/L


()


 


Total Protein


 


 5.2 g/dL


(6.4-8.2)


 


Albumin


 


 2.0 g/dL


(3.4-5.0)


 


Albumin/Globulin Ratio  0.6 (1.0-1.7) 








Comments


CXR, reviewed 


 


Bilateral pneumonia, more confluent in the right upper lobe.





Impression


.


IMPRESSION:


1.  Acute hypoxic respiratory failure secondary to COVID-19 pneumonia and acute


lung injury.


2.  No significant tobacco history.


3.  Abnormal chest x-ray consistent with COVID-19 pneumonia.


4.  Mildly increased liver function tests.


5. hypoxemia


6. fever





Plan


.


RECOMMENDATIONS:


1.  Continue present BIPAP, setting reviewed, titrate FiO2 to keep sat 92%. 

vapotherm as tolerated


2.  Monitor the course of treatment.  


3.  Cont. antibiotics. Continue Zosyn (started 8/5), add Zyvox 


4. change solumedrol to 40 bid 


5.  Monitor for inflammatory markers, D-Dimer 0.87


6.  High dose DVT prophylaxis.


7.  S/P convalescent plasma on 8/5/2020 and remdesivir.


8.  Discussed with RN and RT.











GORAN GONZALEZ MD                Aug 9, 2020 06:34

## 2020-08-09 NOTE — PDOC
Infectious Disease Note


Subjective


Subjective


Fever 100.2 (axillary), dry cough and mild aches 


Remains on BiPAP, FiO2 down to 40%


Denies chest discomfort or wheezing


Denies chills 


Denies N/V/diarrhea





ROS


ROS


as mentioned above





Vital Sign


Vital Signs





Vital Signs








  Date Time  Temp Pulse Resp B/P (MAP) Pulse Ox O2 Delivery O2 Flow Rate FiO2


 


8/9/20 08:00 100.2 72 25 121/49 (73) 95 BiPAP/CPAP  





 100.2       











Physical Exam


PHYSICAL EXAM


GENERAL:  Propped up in bed, alert, calm, on BiPAP


HEENT:  PERRL. Oral cavity dry 


NECK:  Supple


LUNGS:  Diminished aeration, no accessory muscle use 


HEART:  S1, S2 regular.


ABDOMEN:  Soft and nontender 


EXTREMITIES:  No edema or cyanosis. SCDs bilaterally 


SKIN:  No signs of rash.


NEUROLOGIC:  Alert and oriented, 


PIV





Labs


Lab





Laboratory Tests








Test


 8/9/20


04:30


 


White Blood Count


 11.4 x10^3/uL


(4.0-11.0)


 


Red Blood Count


 4.30 x10^6/uL


(3.50-5.40)


 


Hemoglobin


 12.4 g/dL


(12.0-15.5)


 


Hematocrit


 38.0 %


(36.0-47.0)


 


Mean Corpuscular Volume 89 fL () 


 


Mean Corpuscular Hemoglobin 29 pg (25-35) 


 


Mean Corpuscular Hemoglobin


Concent 33 g/dL


(31-37)


 


Red Cell Distribution Width


 13.7 %


(11.5-14.5)


 


Platelet Count


 374 x10^3/uL


(140-400)


 


Neutrophils (%) (Auto) 89 % (31-73) 


 


Lymphocytes (%) (Auto) 7 % (24-48) 


 


Monocytes (%) (Auto) 4 % (0-9) 


 


Eosinophils (%) (Auto) 0 % (0-3) 


 


Basophils (%) (Auto) 0 % (0-3) 


 


Neutrophils # (Auto)


 10.1 x10^3/uL


(1.8-7.7)


 


Lymphocytes # (Auto)


 0.8 x10^3/uL


(1.0-4.8)


 


Monocytes # (Auto)


 0.5 x10^3/uL


(0.0-1.1)


 


Eosinophils # (Auto)


 0.0 x10^3/uL


(0.0-0.7)


 


Basophils # (Auto)


 0.0 x10^3/uL


(0.0-0.2)


 


Sodium Level


 145 mmol/L


(136-145)


 


Potassium Level


 3.3 mmol/L


(3.5-5.1)


 


Chloride Level


 109 mmol/L


()


 


Carbon Dioxide Level


 28 mmol/L


(21-32)


 


Anion Gap 8 (6-14) 


 


Blood Urea Nitrogen


 19 mg/dL


(7-20)


 


Creatinine


 0.6 mg/dL


(0.6-1.0)


 


Estimated GFR


(Cockcroft-Gault) 99.1 





 


BUN/Creatinine Ratio 32 (6-20) 


 


Glucose Level


 78 mg/dL


(70-99)


 


Calcium Level


 7.8 mg/dL


(8.5-10.1)


 


Total Bilirubin


 0.7 mg/dL


(0.2-1.0)


 


Aspartate Amino Transf


(AST/SGOT) 31 U/L (15-37) 





 


Alanine Aminotransferase


(ALT/SGPT) 34 U/L (14-59) 





 


Alkaline Phosphatase


 60 U/L


()


 


Total Protein


 5.2 g/dL


(6.4-8.2)


 


Albumin


 2.0 g/dL


(3.4-5.0)


 


Albumin/Globulin Ratio 0.6 (1.0-1.7) 











Objective


Assessment


New fever


Leukocytosis, mild - on steroids 


COVID-19 positive. s/p convalescent plasma and Remdesivir. 


Bilateral pulmonary infiltrate.


Hypoxemia. On BiPAP 


Obesity.





Plan


Plan of Care


Monitor temp 


BC and UA C&S


Repeat CBC in am 


Continue Zosyn (started 8/5), add Zyvox 


Steroids


Convalescent plasma and Remdesivir given


Probiotics 


Maintain aspiration precautions


Airborne isolation for positive COVID





Critically ill


Attending Co-Sign


The patient was seen and interviewed as well as examined at the bedside. The 

chart was reviewed. The case was discussed. Agree with the plan of care.











KARLA QUINN         Aug 9, 2020 09:00


LING AYALA MD                Aug 9, 2020 10:35

## 2020-08-09 NOTE — PDOC
[facility] PROGRESS NOTE


Date of Service


DOS:


DATE: 8/9/20 


TIME: 09:21





Chief Complaint


Chief Complaint


A/P:


Acute hypoxic respiratory failuresecondary to SARSCo.2 pneumonia.  Continue 

supportive therapy, steroids room to severe protocol initiated on 8 4 will 

continue here.  Consult ID and pulmonology


SARS- COV2 pneumonia -continue IV Solu-Medrol wean O2 as tolerated I discussed 

with pulmonology to move to a negative pressure room if her desaturations worsen

and she may need BiPAP.. Will try experimental drug to severe and convalescent 

plasma therapies with ID and pulmonology.


Hypokalemiawill replace, check magnesium level


Diarrhealikely COVID-19 related.  Will check for C. difficile per protocol if 

more than 3 bowel movements in a day


Transaminitis also likely related COVID-19, will monitor


Severe protein calorie malnutritionlikely related to above





FEN - General diet


PPX - lovenox


FULL CODE


Dispo - ICU for above


CC time 38 minutes





History of Present Illness


History of Present Illness


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 


On 7/30/2020 she began having symptoms of diarrhea and shortness of breath and 

went to the ED to have COVID-19 testing, she was notified of the results on 

7/31/2020 and asked to quarantine at home for 14 days.  Over the course the next

5 days she became progressively more weak had fever and chills and began having 

myalgias and worsening diarrhea with little bit of abdominal pain.  She returned

to the ED on 8/4/2020 at [facility] and was found to be hypoxic.


ABG on 8/4/2020 7.472/30 5.1/94.3 with HCO3 of 26.2 on nonrebreather 10 L labs 

on 8 4 sodium 135, K3.2, chloride 99, CO2 25, BUN 17, creatinine 1, glucose 115,

lactate 1.9, calcium 8, ferritin 1491, bilirubin 0.4, , ALT 5 8, AL K 

phosphatase 62 troponin negative, .6, total protein 6.3, albumin 2.7 

prealbumin 9, lipase 185, B12 995, procalcitonin 0.40, TSH 1.427, d-dimer 1.16, 

WBC 7.1, Hb 14.1, platelets 302 chest x-ray on 8 4 read as worsening 

interstitial and airspace opacities in the right upper lobe and left lung base 

and worsening interstitial opacities in the right lung base compatible with 

multifocal pneumonia


EKG appears normal sinus rhythm with heart rate approximately 85 bpm with normal

axis some inferior T wave changes that are nonspecific no ST segment changes.





8/6: Seen in our ICU on Vapotherm.  She is still complaining of weakness and 

diarrhea to me, 2 BM this morning. Afebrile. Still with cough today. O2 53 on 

Vapotherm. Remdesivir and convalescent FFP given


8/7: O2 63 on ABG this morning after BIPAP overnight. She feels a bit improved, 

appetite improved. No diarrhea yet today. Still weak with myalgias.


8/8:Less short of breath, on BiPAP with FiO2 70%.  No chest pain or wheezing, no

fevers.  Still with some loose stools.  LFTs normal today.  WBC 10.2, Hb 12.7, 

platelets 4 2,  5K3.6 BUN 22, CR 0.6 ABG 7.48, 35, 69 BiPAP 70%.





Febrile 1 1.2 F.  Seen on BiPAP 14/6 with 50% FiO2.  She is very comfortable 

with this.  Has not had a bowel movement a day and a half.  Asking a 70 bedpan 

right now though.  Appetite is decreased.





Plan:


Already on Zosyn, have discussed with ID adding Zyvox would be appropriate.





Vitals/I&O


Vitals/I&O:





                                   Vital Signs








  Date Time  Temp Pulse Resp B/P (MAP) Pulse Ox O2 Delivery O2 Flow Rate FiO2


 


8/9/20 09:00  72 25 132/61 (84) 96 BiPAP/CPAP  


 


8/9/20 08:00 100.2       





 100.2       














                                    I & O   


 


 8/8/20 8/8/20 8/9/20





 15:00 23:00 07:00


 


Intake Total 450 ml 100 ml 


 


Output Total 175 ml 300 ml 


 


Balance 275 ml -200 ml 











Physical Exam


Physical Exam:


GENERAL:  Propped up in bed, alert, calm, on BiPAP


HEENT:  PERRL. Oral cavity dry 


NECK:  Supple


LUNGS:  Diminished aeration, no accessory muscle use 


HEART:  S1, S2 regular.


ABDOMEN:  Soft and nontender 


EXTREMITIES:  No edema or cyanosis. SCDs bilaterally 


SKIN:  No signs of rash.


NEUROLOGIC:  Alert and oriented, 


PIV


General:  Alert, Oriented X3, Cooperative, moderate distress


Heart:  Other (Rhonchi bilaterally)


Abdomen:  Normal bowel sounds, Soft, No tenderness, No hepatosplenomegaly, No 

masses


Extremities:  No clubbing, No cyanosis, No edema, Normal pulses, No 

tenderness/swelling


Skin:  No rashes, No breakdown, No significant lesion





Labs


Labs:





Laboratory Tests








Test


 8/9/20


04:30


 


White Blood Count


 11.4 x10^3/uL


(4.0-11.0)


 


Red Blood Count


 4.30 x10^6/uL


(3.50-5.40)


 


Hemoglobin


 12.4 g/dL


(12.0-15.5)


 


Hematocrit


 38.0 %


(36.0-47.0)


 


Mean Corpuscular Volume 89 fL () 


 


Mean Corpuscular Hemoglobin 29 pg (25-35) 


 


Mean Corpuscular Hemoglobin


Concent 33 g/dL


(31-37)


 


Red Cell Distribution Width


 13.7 %


(11.5-14.5)


 


Platelet Count


 374 x10^3/uL


(140-400)


 


Neutrophils (%) (Auto) 89 % (31-73) 


 


Lymphocytes (%) (Auto) 7 % (24-48) 


 


Monocytes (%) (Auto) 4 % (0-9) 


 


Eosinophils (%) (Auto) 0 % (0-3) 


 


Basophils (%) (Auto) 0 % (0-3) 


 


Neutrophils # (Auto)


 10.1 x10^3/uL


(1.8-7.7)


 


Lymphocytes # (Auto)


 0.8 x10^3/uL


(1.0-4.8)


 


Monocytes # (Auto)


 0.5 x10^3/uL


(0.0-1.1)


 


Eosinophils # (Auto)


 0.0 x10^3/uL


(0.0-0.7)


 


Basophils # (Auto)


 0.0 x10^3/uL


(0.0-0.2)


 


Sodium Level


 145 mmol/L


(136-145)


 


Potassium Level


 3.3 mmol/L


(3.5-5.1)


 


Chloride Level


 109 mmol/L


()


 


Carbon Dioxide Level


 28 mmol/L


(21-32)


 


Anion Gap 8 (6-14) 


 


Blood Urea Nitrogen


 19 mg/dL


(7-20)


 


Creatinine


 0.6 mg/dL


(0.6-1.0)


 


Estimated GFR


(Cockcroft-Gault) 99.1 





 


BUN/Creatinine Ratio 32 (6-20) 


 


Glucose Level


 78 mg/dL


(70-99)


 


Calcium Level


 7.8 mg/dL


(8.5-10.1)


 


Total Bilirubin


 0.7 mg/dL


(0.2-1.0)


 


Aspartate Amino Transf


(AST/SGOT) 31 U/L (15-37) 





 


Alanine Aminotransferase


(ALT/SGPT) 34 U/L (14-59) 





 


Alkaline Phosphatase


 60 U/L


()


 


Total Protein


 5.2 g/dL


(6.4-8.2)


 


Albumin


 2.0 g/dL


(3.4-5.0)


 


Albumin/Globulin Ratio 0.6 (1.0-1.7) 











Comment


Review of Relevant


I have reviewed the following items anum (where applicable) has been applied.


Medications:





Current Medications








 Medications


  (Trade)  Dose


 Ordered  Sig/Gaby


 Route


 PRN Reason  Start Time


 Stop Time Status Last Admin


Dose Admin


 


 Methylprednisolone


 Sodium Succinate


  (SOLU-Medrol


 40MG VIAL)  40 mg  Q12HR


 IV


   8/8/20 21:00


    8/9/20 08:00














Justicifation of Admission Dx:


Justifications for Admission:


Justification of Admission Dx:  Yes


Respiratory Failure:  Severe Resp Distress











BLAIR ERICKSON MD         Aug 9, 2020 09:22

## 2020-08-10 VITALS — DIASTOLIC BLOOD PRESSURE: 72 MMHG | SYSTOLIC BLOOD PRESSURE: 142 MMHG

## 2020-08-10 VITALS — SYSTOLIC BLOOD PRESSURE: 135 MMHG | DIASTOLIC BLOOD PRESSURE: 75 MMHG

## 2020-08-10 VITALS — DIASTOLIC BLOOD PRESSURE: 57 MMHG | SYSTOLIC BLOOD PRESSURE: 142 MMHG

## 2020-08-10 VITALS — DIASTOLIC BLOOD PRESSURE: 64 MMHG | SYSTOLIC BLOOD PRESSURE: 117 MMHG

## 2020-08-10 VITALS — SYSTOLIC BLOOD PRESSURE: 124 MMHG | DIASTOLIC BLOOD PRESSURE: 63 MMHG

## 2020-08-10 VITALS — DIASTOLIC BLOOD PRESSURE: 66 MMHG | SYSTOLIC BLOOD PRESSURE: 124 MMHG

## 2020-08-10 VITALS — SYSTOLIC BLOOD PRESSURE: 150 MMHG | DIASTOLIC BLOOD PRESSURE: 71 MMHG

## 2020-08-10 VITALS — DIASTOLIC BLOOD PRESSURE: 66 MMHG | SYSTOLIC BLOOD PRESSURE: 134 MMHG

## 2020-08-10 VITALS — SYSTOLIC BLOOD PRESSURE: 142 MMHG | DIASTOLIC BLOOD PRESSURE: 72 MMHG

## 2020-08-10 VITALS — DIASTOLIC BLOOD PRESSURE: 64 MMHG | SYSTOLIC BLOOD PRESSURE: 140 MMHG

## 2020-08-10 VITALS — SYSTOLIC BLOOD PRESSURE: 117 MMHG | DIASTOLIC BLOOD PRESSURE: 46 MMHG

## 2020-08-10 VITALS — DIASTOLIC BLOOD PRESSURE: 60 MMHG | SYSTOLIC BLOOD PRESSURE: 116 MMHG

## 2020-08-10 VITALS — SYSTOLIC BLOOD PRESSURE: 124 MMHG | DIASTOLIC BLOOD PRESSURE: 59 MMHG

## 2020-08-10 VITALS — DIASTOLIC BLOOD PRESSURE: 55 MMHG | SYSTOLIC BLOOD PRESSURE: 99 MMHG

## 2020-08-10 VITALS — SYSTOLIC BLOOD PRESSURE: 134 MMHG | DIASTOLIC BLOOD PRESSURE: 68 MMHG

## 2020-08-10 VITALS — DIASTOLIC BLOOD PRESSURE: 55 MMHG | SYSTOLIC BLOOD PRESSURE: 122 MMHG

## 2020-08-10 VITALS — DIASTOLIC BLOOD PRESSURE: 67 MMHG | SYSTOLIC BLOOD PRESSURE: 141 MMHG

## 2020-08-10 VITALS — SYSTOLIC BLOOD PRESSURE: 138 MMHG | DIASTOLIC BLOOD PRESSURE: 66 MMHG

## 2020-08-10 VITALS — SYSTOLIC BLOOD PRESSURE: 123 MMHG | DIASTOLIC BLOOD PRESSURE: 58 MMHG

## 2020-08-10 VITALS — DIASTOLIC BLOOD PRESSURE: 80 MMHG | SYSTOLIC BLOOD PRESSURE: 131 MMHG

## 2020-08-10 VITALS — DIASTOLIC BLOOD PRESSURE: 63 MMHG | SYSTOLIC BLOOD PRESSURE: 133 MMHG

## 2020-08-10 VITALS — SYSTOLIC BLOOD PRESSURE: 136 MMHG | DIASTOLIC BLOOD PRESSURE: 68 MMHG

## 2020-08-10 VITALS — DIASTOLIC BLOOD PRESSURE: 58 MMHG | SYSTOLIC BLOOD PRESSURE: 119 MMHG

## 2020-08-10 LAB
ALBUMIN SERPL-MCNC: 2 G/DL (ref 3.4–5)
ALBUMIN/GLOB SERPL: 0.7 {RATIO} (ref 1–1.7)
ALP SERPL-CCNC: 59 U/L (ref 46–116)
ALT SERPL-CCNC: 31 U/L (ref 14–59)
ANION GAP SERPL CALC-SCNC: 4 MMOL/L (ref 6–14)
AST SERPL-CCNC: 26 U/L (ref 15–37)
BASE EXCESS ABG: 3 MMOL/L (ref -3–3)
BASOPHILS # BLD AUTO: 0 X10^3/UL (ref 0–0.2)
BASOPHILS NFR BLD: 0 % (ref 0–3)
BILIRUB SERPL-MCNC: 0.6 MG/DL (ref 0.2–1)
BUN SERPL-MCNC: 21 MG/DL (ref 7–20)
BUN/CREAT SERPL: 35 (ref 6–20)
CALCIUM SERPL-MCNC: 7.7 MG/DL (ref 8.5–10.1)
CHLORIDE SERPL-SCNC: 107 MMOL/L (ref 98–107)
CO2 SERPL-SCNC: 31 MMOL/L (ref 21–32)
CREAT SERPL-MCNC: 0.6 MG/DL (ref 0.6–1)
EOSINOPHIL NFR BLD: 0 % (ref 0–3)
EOSINOPHIL NFR BLD: 0 X10^3/UL (ref 0–0.7)
ERYTHROCYTE [DISTWIDTH] IN BLOOD BY AUTOMATED COUNT: 13.6 % (ref 11.5–14.5)
GFR SERPLBLD BASED ON 1.73 SQ M-ARVRAT: 99.1 ML/MIN
GLOBULIN SER-MCNC: 2.9 G/DL (ref 2.2–3.8)
GLUCOSE SERPL-MCNC: 123 MG/DL (ref 70–99)
HCO3 BLDA-SCNC: 27 MMOL/L (ref 21–28)
HCT VFR BLD CALC: 37.6 % (ref 36–47)
HGB BLD-MCNC: 12.4 G/DL (ref 12–15.5)
INSPIRATION SETTING TIME VENT: 50
LYMPHOCYTES # BLD: 0.5 X10^3/UL (ref 1–4.8)
LYMPHOCYTES NFR BLD AUTO: 5 % (ref 24–48)
MCH RBC QN AUTO: 29 PG (ref 25–35)
MCHC RBC AUTO-ENTMCNC: 33 G/DL (ref 31–37)
MCV RBC AUTO: 89 FL (ref 79–100)
MONO #: 0.4 X10^3/UL (ref 0–1.1)
MONOCYTES NFR BLD: 4 % (ref 0–9)
NEUT #: 9.4 X10^3/UL (ref 1.8–7.7)
NEUTROPHILS NFR BLD AUTO: 91 % (ref 31–73)
PCO2 BLDA: 36 MMHG (ref 35–46)
PLATELET # BLD AUTO: 321 X10^3/UL (ref 140–400)
PO2 BLDA: 52 MMHG (ref 65–108)
POTASSIUM SERPL-SCNC: 3.9 MMOL/L (ref 3.5–5.1)
PROT SERPL-MCNC: 4.9 G/DL (ref 6.4–8.2)
RBC # BLD AUTO: 4.24 X10^6/UL (ref 3.5–5.4)
SAO2 % BLDA: 88 % (ref 92–99)
SODIUM SERPL-SCNC: 142 MMOL/L (ref 136–145)
WBC # BLD AUTO: 10.3 X10^3/UL (ref 4–11)

## 2020-08-10 PROCEDURE — 5A09357 ASSISTANCE WITH RESPIRATORY VENTILATION, LESS THAN 24 CONSECUTIVE HOURS, CONTINUOUS POSITIVE AIRWAY PRESSURE: ICD-10-PCS | Performed by: INTERNAL MEDICINE

## 2020-08-10 RX ADMIN — Medication SCH CAP: at 08:20

## 2020-08-10 RX ADMIN — METHYLPREDNISOLONE SODIUM SUCCINATE SCH MG: 40 INJECTION, POWDER, FOR SOLUTION INTRAMUSCULAR; INTRAVENOUS at 08:22

## 2020-08-10 RX ADMIN — ENOXAPARIN SODIUM SCH MG: 40 INJECTION SUBCUTANEOUS at 08:20

## 2020-08-10 RX ADMIN — BENZONATATE SCH MG: 100 CAPSULE, LIQUID FILLED ORAL at 12:17

## 2020-08-10 RX ADMIN — LINEZOLID SCH MG: 600 TABLET, FILM COATED ORAL at 20:39

## 2020-08-10 RX ADMIN — PIPERACILLIN SODIUM AND TAZOBACTAM SODIUM SCH MLS/HR: 3; .375 INJECTION, POWDER, LYOPHILIZED, FOR SOLUTION INTRAVENOUS at 12:17

## 2020-08-10 RX ADMIN — BENZONATATE SCH MG: 100 CAPSULE, LIQUID FILLED ORAL at 20:39

## 2020-08-10 RX ADMIN — PIPERACILLIN SODIUM AND TAZOBACTAM SODIUM SCH MLS/HR: 3; .375 INJECTION, POWDER, LYOPHILIZED, FOR SOLUTION INTRAVENOUS at 05:47

## 2020-08-10 RX ADMIN — METHYLPREDNISOLONE SODIUM SUCCINATE SCH MG: 40 INJECTION, POWDER, FOR SOLUTION INTRAMUSCULAR; INTRAVENOUS at 20:39

## 2020-08-10 RX ADMIN — ZINC SULFATE CAP 220 MG (50 MG ELEMENTAL ZN) SCH MG: 220 (50 ZN) CAP at 08:20

## 2020-08-10 RX ADMIN — BENZONATATE SCH MG: 100 CAPSULE, LIQUID FILLED ORAL at 12:18

## 2020-08-10 RX ADMIN — Medication SCH CAP: at 20:39

## 2020-08-10 RX ADMIN — ENOXAPARIN SODIUM SCH MG: 40 INJECTION SUBCUTANEOUS at 20:38

## 2020-08-10 RX ADMIN — LINEZOLID SCH MG: 600 TABLET, FILM COATED ORAL at 12:16

## 2020-08-10 RX ADMIN — MULTIPLE VITAMINS W/ MINERALS TAB SCH TAB: TAB at 15:00

## 2020-08-10 RX ADMIN — WATER PRN ML: 1 INJECTION INTRAVENOUS at 12:06

## 2020-08-10 RX ADMIN — PIPERACILLIN SODIUM AND TAZOBACTAM SODIUM SCH MLS/HR: 3; .375 INJECTION, POWDER, LYOPHILIZED, FOR SOLUTION INTRAVENOUS at 18:21

## 2020-08-10 NOTE — PDOC
[facility] PROGRESS NOTE


Date of Service


DOS:


DATE: 8/10/20 


TIME: 12:28





Chief Complaint


Chief Complaint


A/P:


Acute hypoxic respiratory failuresecondary to SARSCo.2 pneumonia.  Continue 

supportive therapy, steroids room to severe protocol initiated on 8 4 will 

continue here.  Consult ID and pulmonology


SARS- COV2 pneumonia -continue IV Solu-Medrol wean O2 as tolerated I discussed 

with pulmonology to move to a negative pressure room if her desaturations worsen

and she may need BiPAP.. Will try experimental drug to severe and convalescent 

plasma therapies with ID and pulmonology.


Hypokalemiawill replace, check magnesium level


Diarrhealikely COVID-19 related.  Will check for C. difficile per protocol if 

more than 3 bowel movements in a day


Transaminitis also likely related COVID-19, will monitor


Severe protein calorie malnutritionlikely related to above





FEN - General diet


PPX - lovenox


FULL CODE


Dispo - ICU for above


CC time 38 minutes





History of Present Illness


History of Present Illness


08/10/20


Patient seen and examined in ICU


Discussed with RN


Patient seems to be improving 


She has a bit of a cough


Patient has received treatment with plasma and remdesivir





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 


On 7/30/2020 she began having symptoms of diarrhea and shortness of breath and 

went to the ED to have COVID-19 testing, she was notified of the results on 

7/31/2020 and asked to quarantine at home for 14 days.  Over the course the next

5 days she became progressively more weak had fever and chills and began having 

myalgias and worsening diarrhea with little bit of abdominal pain.  She returned

to the ED on 8/4/2020 at [facility] and was found to be hypoxic.


ABG on 8/4/2020 7.472/30 5.1/94.3 with HCO3 of 26.2 on nonrebreather 10 L labs 

on 8 4 sodium 135, K3.2, chloride 99, CO2 25, BUN 17, creatinine 1, glucose 115,

lactate 1.9, calcium 8, ferritin 1491, bilirubin 0.4, , ALT 5 8, AL K 

phosphatase 62 troponin negative, .6, total protein 6.3, albumin 2.7 

prealbumin 9, lipase 185, B12 995, procalcitonin 0.40, TSH 1.427, d-dimer 1.16, 

WBC 7.1, Hb 14.1, platelets 302 chest x-ray on 8 4 read as worsening 

interstitial and airspace opacities in the right upper lobe and left lung base 

and worsening interstitial opacities in the right lung base compatible with 

multifocal pneumonia


EKG appears normal sinus rhythm with heart rate approximately 85 bpm with normal

axis some inferior T wave changes that are nonspecific no ST segment changes.





8/6: Seen in our ICU on Vapotherm.  She is still complaining of weakness and 

diarrhea to me, 2 BM this morning. Afebrile. Still with cough today. O2 53 on 

Vapotherm. Remdesivir and convalescent FFP given


8/7: O2 63 on ABG this morning after BIPAP overnight. She feels a bit improved, 

appetite improved. No diarrhea yet today. Still weak with myalgias.


8/8:Less short of breath, on BiPAP with FiO2 70%.  No chest pain or wheezing, no

fevers.  Still with some loose stools.  LFTs normal today.  WBC 10.2, Hb 12.7, 

platelets 4 2,  5K3.6 BUN 22, CR 0.6 ABG 7.48, 35, 69 BiPAP 70%.





Febrile 1 1.2 F.  Seen on BiPAP 14/6 with 50% FiO2.  She is very comfortable 

with this.  Has not had a bowel movement a day and a half.  Asking a 70 bedpan 

right now though.  Appetite is decreased.





Plan:


Already on Zosyn, have discussed with ID adding Zyvox would be appropriate.





Vitals/I&O


Vitals/I&O:





                                   Vital Signs








  Date Time  Temp Pulse Resp B/P (MAP) Pulse Ox O2 Delivery O2 Flow Rate FiO2


 


8/10/20 12:11      Bi-pap  


 


8/10/20 12:11  73 27 99/55 (70) 95   


 


8/10/20 11:57       40.0 


 


8/10/20 11:06 98.6       





 98.6       














                                    I & O   


 


 8/9/20 8/9/20 8/10/20





 15:00 23:00 07:00


 


Intake Total 150 ml 400 ml 


 


Output Total 250 ml 150 ml 


 


Balance -100 ml 250 ml 











Physical Exam


Physical Exam:


GENERAL:  Propped up in bed, alert, calm, on BiPAP


HEENT:  PERRL. Oral cavity dry 


NECK:  Supple


LUNGS:  Diminished aeration, no accessory muscle use 


HEART:  S1, S2 regular.


ABDOMEN:  Soft and nontender 


EXTREMITIES:  No edema or cyanosis. SCDs bilaterally 


SKIN:  No signs of rash.


NEUROLOGIC:  Alert and oriented, 


PIV


General:  Alert, Oriented X3, Cooperative, moderate distress


Heart:  Other (Rhonchi bilaterally)


Abdomen:  Normal bowel sounds, Soft, No tenderness, No hepatosplenomegaly, No 

masses


Extremities:  No clubbing, No cyanosis, No edema, Normal pulses, No 

tenderness/swelling


Skin:  No rashes, No breakdown, No significant lesion





Labs


Labs:





Laboratory Tests








Test


 8/9/20


14:15 8/10/20


04:05 8/10/20


07:30


 


Urine Collection Type Unknown   


 


Urine Color Yellow   


 


Urine Clarity Clear   


 


Urine pH 5.5 (<5.0-8.0)   


 


Urine Specific Gravity


 >=1.030


(1.000-1.030) 


 





 


Urine Protein


 30 mg/dL


(NEG-TRACE) 


 





 


Urine Glucose (UA)


 250 mg/dL


(NEG) 


 





 


Urine Ketones (Stick)


 Trace mg/dL


(NEG) 


 





 


Urine Blood Trace (NEG)   


 


Urine Nitrite Negative (NEG)   


 


Urine Bilirubin Negative (NEG)   


 


Urine Urobilinogen Dipstick


 0.2 mg/dL (0.2


mg/dL) 


 





 


Urine Leukocyte Esterase Small (NEG)   


 


Urine RBC 0 /HPF (0-2)   


 


Urine WBC Occ /HPF (0-4)   


 


Urine Squamous Epithelial


Cells Mod /LPF 


 


 





 


Urine Bacteria


 Few /HPF


(0-FEW) 


 





 


Urine Yeast Present /HPF   


 


White Blood Count


 


 10.3 x10^3/uL


(4.0-11.0) 





 


Red Blood Count


 


 4.24 x10^6/uL


(3.50-5.40) 





 


Hemoglobin


 


 12.4 g/dL


(12.0-15.5) 





 


Hematocrit


 


 37.6 %


(36.0-47.0) 





 


Mean Corpuscular Volume  89 fL ()  


 


Mean Corpuscular Hemoglobin  29 pg (25-35)  


 


Mean Corpuscular Hemoglobin


Concent 


 33 g/dL


(31-37) 





 


Red Cell Distribution Width


 


 13.6 %


(11.5-14.5) 





 


Platelet Count


 


 321 x10^3/uL


(140-400) 





 


Neutrophils (%) (Auto)  91 % (31-73)  


 


Lymphocytes (%) (Auto)  5 % (24-48)  


 


Monocytes (%) (Auto)  4 % (0-9)  


 


Eosinophils (%) (Auto)  0 % (0-3)  


 


Basophils (%) (Auto)  0 % (0-3)  


 


Neutrophils # (Auto)


 


 9.4 x10^3/uL


(1.8-7.7) 





 


Lymphocytes # (Auto)


 


 0.5 x10^3/uL


(1.0-4.8) 





 


Monocytes # (Auto)


 


 0.4 x10^3/uL


(0.0-1.1) 





 


Eosinophils # (Auto)


 


 0.0 x10^3/uL


(0.0-0.7) 





 


Basophils # (Auto)


 


 0.0 x10^3/uL


(0.0-0.2) 





 


Sodium Level


 


 142 mmol/L


(136-145) 





 


Potassium Level


 


 3.9 mmol/L


(3.5-5.1) 





 


Chloride Level


 


 107 mmol/L


() 





 


Carbon Dioxide Level


 


 31 mmol/L


(21-32) 





 


Anion Gap  4 (6-14)  


 


Blood Urea Nitrogen


 


 21 mg/dL


(7-20) 





 


Creatinine


 


 0.6 mg/dL


(0.6-1.0) 





 


Estimated GFR


(Cockcroft-Gault) 


 99.1 


 





 


BUN/Creatinine Ratio  35 (6-20)  


 


Glucose Level


 


 123 mg/dL


(70-99) 





 


Calcium Level


 


 7.7 mg/dL


(8.5-10.1) 





 


Total Bilirubin


 


 0.6 mg/dL


(0.2-1.0) 





 


Aspartate Amino Transf


(AST/SGOT) 


 26 U/L (15-37) 


 





 


Alanine Aminotransferase


(ALT/SGPT) 


 31 U/L (14-59) 


 





 


Alkaline Phosphatase


 


 59 U/L


() 





 


Total Protein


 


 4.9 g/dL


(6.4-8.2) 





 


Albumin


 


 2.0 g/dL


(3.4-5.0) 





 


Albumin/Globulin Ratio  0.7 (1.0-1.7)  


 


O2 Saturation   88 % (92-99) 


 


Arterial Blood pH


 


 


 7.49


(7.35-7.45)


 


Arterial Blood pCO2 at


Patient Temp 


 


 36 mmHg


(35-46)


 


Arterial Blood pO2 at Patient


Temp 


 


 52 mmHg


()


 


Arterial Blood HCO3


 


 


 27 mmol/L


(21-28)


 


Arterial Blood Base Excess


 


 


 3 mmol/L


(-3-3)


 


FiO2   50 











Assessment and Plan


Assessmemt and Plan


Assessment:


A/P:


Acute hypoxic respiratory failuresecondary to SARSCo.2 pneumonia.  Continue 

supportive therapy, steroids room to severe protocol initiated on 8 4 will 

continue here.  Consult ID and pulmonology


SARS- COV2 pneumonia -continue IV Solu-Medrol wean O2 as tolerated I discussed 

with pulmonology to move to a negative pressure room if her desaturations worsen

and she may need BiPAP.. Will try experimental drug to severe and convalescent 

plasma therapies with ID and pulmonology.


Hypokalemiawill replace, check magnesium level


Diarrhealikely COVID-19 related.  Will check for C. difficile per protocol if 

more than 3 bowel movements in a day


Transaminitis also likely related COVID-19, will monitor


Severe protein calorie malnutritionlikely related to above





Plan:


ICU monitoring


Cardiac monitoring


Full code 


Tessalon 200mg PO


DVT prophylaxis 


Appreciate subspecialist input





Comment


Review of Relevant


I have reviewed the following items anum (where applicable) has been applied.


Medications:





Current Medications








 Medications


  (Trade)  Dose


 Ordered  Sig/Gaby


 Route


 PRN Reason  Start Time


 Stop Time Status Last Admin


Dose Admin


 


 Benzonatate


  (Tessalon Perle)  200 mg  Q8HRS


 PO


   8/10/20 12:00


    8/10/20 12:17














Justicifation of Admission Dx:


Justifications for Admission:


Justification of Admission Dx:  Yes


Respiratory Failure:  Severe Resp Distress











VELASQUEZ ZIMMER III DO           Aug 10, 2020 12:36

## 2020-08-10 NOTE — NUR
SS following up with discharge planning. SS reviewed pt chart and discussed with pt RN. Pt 
is currently on BIPAP HS and Vapotherm during the day. Pt on IV Zosyn. COVID19 positive. SS 
will continue to follow for discharge planning.

## 2020-08-10 NOTE — PDOC
Infectious Disease Note


Subjective:


Subjective


Pt feels better


t max 100.2


Remains on BiPAP 


some cough , 


Denies chest discomfort or wheezing


Denies chills 


Denies N/V/diarrhea/abdo pain





Vital Signs:


Vital Signs





Vital Signs








  Date Time  Temp Pulse Resp B/P (MAP) Pulse Ox O2 Delivery O2 Flow Rate FiO2


 


8/10/20 07:18     93 BiPAP/CPAP  


 


8/10/20 07:13  61 25 134/66 (88)    


 


8/10/20 04:00 97.9       





 97.9       


 


8/9/20 17:00       40.0 











Physical Exam:


PHYSICAL EXAM


GENERAL:  Propped up in bed, alert, calm, on BiPAP


HEENT:  PERRL. Oral cavity dry 


NECK:  Supple


LUNGS:  Diminished aeration, no accessory muscle use 


HEART:  S1, S2 regular.


ABDOMEN:  Soft and nontender 


EXTREMITIES:  No edema or cyanosis. SCDs bilaterally 


SKIN:  No signs of rash.


NEUROLOGIC:  Alert and oriented, 


PIV





Medications:


Inpatient Meds:





Current Medications








 Medications


  (Trade)  Dose


 Ordered  Sig/Gaby  Start Time


 Stop Time Status Last Admin


Dose Admin


 


 Acetaminophen


  (Tylenol)  650 mg  PRN Q4HRS  PRN  8/5/20 10:30


    8/9/20 07:59


650 MG


 


 Enoxaparin Sodium


  (Lovenox 40mg


 Syringe)  40 mg  Q12HR  8/5/20 10:30


    8/10/20 08:20


40 MG


 


 Guaifenesin


  (Robitussin Dm)  10 ml  PRN Q6HRS  PRN  8/5/20 10:45


     





 


 Lactobacillus


 Rhamnosus


  (Culturelle)  1 cap  BID  8/6/20 21:00


    8/10/20 08:20


1 CAP


 


 Linezolid


  (Zyvox)  600 mg  BID  8/9/20 10:00


    8/9/20 20:56


600 MG


 


 Methylprednisolone


 Sodium Succinate


  (SOLU-Medrol


 40MG VIAL)  40 mg  Q12HR  8/8/20 21:00


    8/10/20 08:22


40 MG


 


 Non-Formulary


 Medication 1 ea/


 Sodium Chloride  230 ml @ 


 460 mls/hr  Q24H  8/5/20 13:00


 8/8/20 13:29 DC 8/8/20 14:30


460 MLS/HR


 


 Ondansetron HCl


  (Zofran)  4 mg  PRN Q4HRS  PRN  8/5/20 10:30


     





 


 Piperacillin Sod/


 Tazobactam Sod


  (Zosyn Per


 Pharmacy)  1 each  PRN DAILY  PRN  8/5/20 11:00


     





 


 Piperacillin Sod/


 Tazobactam Sod


 3.375 gm/Sodium


 Chloride  50 ml @ 


 100 mls/hr  Q6HRS  8/5/20 12:00


    8/10/20 05:47


100 MLS/HR


 


 Potassium


 Chloride/Water  100 ml @ 


 100 mls/hr  Q1H  8/9/20 10:00


 8/9/20 11:59 DC 8/9/20 10:54


100 MLS/HR


 


 Sterile Water


  (WATER for RESP)  1,000 ml  CONT  PRN  8/5/20 16:15


    8/9/20 13:07


1,000 ML


 


 Zinc Sulfate


  (Orazinc)  220 mg  DAILY  8/5/20 11:00


    8/10/20 08:20


220 MG


 


 Zolpidem Tartrate


  (Ambien)  5 mg  PRN QHS  PRN  8/5/20 10:30


     














Labs:


Lab





Laboratory Tests








Test


 8/9/20


14:15 8/10/20


04:05 8/10/20


07:30


 


Urine Collection Type Unknown   


 


Urine Color Yellow   


 


Urine Clarity Clear   


 


Urine pH 5.5 (<5.0-8.0)   


 


Urine Specific Gravity


 >=1.030


(1.000-1.030) 


 





 


Urine Protein


 30 mg/dL


(NEG-TRACE) 


 





 


Urine Glucose (UA)


 250 mg/dL


(NEG) 


 





 


Urine Ketones (Stick)


 Trace mg/dL


(NEG) 


 





 


Urine Blood Trace (NEG)   


 


Urine Nitrite Negative (NEG)   


 


Urine Bilirubin Negative (NEG)   


 


Urine Urobilinogen Dipstick


 0.2 mg/dL (0.2


mg/dL) 


 





 


Urine Leukocyte Esterase Small (NEG)   


 


Urine RBC 0 /HPF (0-2)   


 


Urine WBC Occ /HPF (0-4)   


 


Urine Squamous Epithelial


Cells Mod /LPF 


 


 





 


Urine Bacteria


 Few /HPF


(0-FEW) 


 





 


Urine Yeast Present /HPF   


 


White Blood Count


 


 10.3 x10^3/uL


(4.0-11.0) 





 


Red Blood Count


 


 4.24 x10^6/uL


(3.50-5.40) 





 


Hemoglobin


 


 12.4 g/dL


(12.0-15.5) 





 


Hematocrit


 


 37.6 %


(36.0-47.0) 





 


Mean Corpuscular Volume  89 fL ()  


 


Mean Corpuscular Hemoglobin  29 pg (25-35)  


 


Mean Corpuscular Hemoglobin


Concent 


 33 g/dL


(31-37) 





 


Red Cell Distribution Width


 


 13.6 %


(11.5-14.5) 





 


Platelet Count


 


 321 x10^3/uL


(140-400) 





 


Neutrophils (%) (Auto)  91 % (31-73)  


 


Lymphocytes (%) (Auto)  5 % (24-48)  


 


Monocytes (%) (Auto)  4 % (0-9)  


 


Eosinophils (%) (Auto)  0 % (0-3)  


 


Basophils (%) (Auto)  0 % (0-3)  


 


Neutrophils # (Auto)


 


 9.4 x10^3/uL


(1.8-7.7) 





 


Lymphocytes # (Auto)


 


 0.5 x10^3/uL


(1.0-4.8) 





 


Monocytes # (Auto)


 


 0.4 x10^3/uL


(0.0-1.1) 





 


Eosinophils # (Auto)


 


 0.0 x10^3/uL


(0.0-0.7) 





 


Basophils # (Auto)


 


 0.0 x10^3/uL


(0.0-0.2) 





 


Sodium Level


 


 142 mmol/L


(136-145) 





 


Potassium Level


 


 3.9 mmol/L


(3.5-5.1) 





 


Chloride Level


 


 107 mmol/L


() 





 


Carbon Dioxide Level


 


 31 mmol/L


(21-32) 





 


Anion Gap  4 (6-14)  


 


Blood Urea Nitrogen


 


 21 mg/dL


(7-20) 





 


Creatinine


 


 0.6 mg/dL


(0.6-1.0) 





 


Estimated GFR


(Cockcroft-Gault) 


 99.1 


 





 


BUN/Creatinine Ratio  35 (6-20)  


 


Glucose Level


 


 123 mg/dL


(70-99) 





 


Calcium Level


 


 7.7 mg/dL


(8.5-10.1) 





 


Total Bilirubin


 


 0.6 mg/dL


(0.2-1.0) 





 


Aspartate Amino Transf


(AST/SGOT) 


 26 U/L (15-37) 


 





 


Alanine Aminotransferase


(ALT/SGPT) 


 31 U/L (14-59) 


 





 


Alkaline Phosphatase


 


 59 U/L


() 





 


Total Protein


 


 4.9 g/dL


(6.4-8.2) 





 


Albumin


 


 2.0 g/dL


(3.4-5.0) 





 


Albumin/Globulin Ratio  0.7 (1.0-1.7)  


 


O2 Saturation   88 % (92-99) 


 


Arterial Blood pH


 


 


 7.49


(7.35-7.45)


 


Arterial Blood pCO2 at


Patient Temp 


 


 36 mmHg


(35-46)


 


Arterial Blood pO2 at Patient


Temp 


 


 52 mmHg


()


 


Arterial Blood HCO3


 


 


 27 mmol/L


(21-28)


 


Arterial Blood Base Excess


 


 


 3 mmol/L


(-3-3)


 


FiO2   50 











Objective:


Assessment:


Fever


Leukocytosis, mild - on steroids 


COVID-19 positive. s/p convalescent plasma and Remdesivir. 


Bilateral pulmonary infiltrate.


Hypoxemia. On BiPAP 


Obesity.





Plan:


Plan of Care





Continue Zosyn (started 8/5),  Zyvox (8/9)


Steroids


Convalescent plasma and Remdesivir given


Probiotics 


Maintain aspiration precautions


Airborne isolation for positive COVID


F/U temp/labs


f/u cults  


 


Critically ill











BRET AYALA MD           Aug 10, 2020 08:39

## 2020-08-10 NOTE — PDOC
PULMONARY PROGRESS NOTES


DATE: 8/10/20 


TIME: 10:18


Subjective


on vapotherm, sob better, has occ cough, no pain, 


gets sob easily


Vitals





Vital Signs








  Date Time  Temp Pulse Resp B/P (MAP) Pulse Ox O2 Delivery O2 Flow Rate FiO2


 


8/10/20 10:01  81 24 117/46 (69) 95 vapotherm  


 


8/10/20 08:00 98.0       





 98.0       


 


8/9/20 17:00       40.0 








Comments


on vapotherm


appears comfortable


no paradoxical abd  motion


no audible wheezing


alert


RRR, 


no edema 


No rash


ROS:  No Nausea, No Chest Pain, No Abdominal Pain, No Increase Cough


Labs





Laboratory Tests








Test


 8/9/20


04:30 8/9/20


14:15 8/10/20


04:05 8/10/20


07:30


 


White Blood Count


 11.4 x10^3/uL


(4.0-11.0) 


 10.3 x10^3/uL


(4.0-11.0) 





 


Red Blood Count


 4.30 x10^6/uL


(3.50-5.40) 


 4.24 x10^6/uL


(3.50-5.40) 





 


Hemoglobin


 12.4 g/dL


(12.0-15.5) 


 12.4 g/dL


(12.0-15.5) 





 


Hematocrit


 38.0 %


(36.0-47.0) 


 37.6 %


(36.0-47.0) 





 


Mean Corpuscular Volume 89 fL ()   89 fL ()  


 


Mean Corpuscular Hemoglobin 29 pg (25-35)   29 pg (25-35)  


 


Mean Corpuscular Hemoglobin


Concent 33 g/dL


(31-37) 


 33 g/dL


(31-37) 





 


Red Cell Distribution Width


 13.7 %


(11.5-14.5) 


 13.6 %


(11.5-14.5) 





 


Platelet Count


 374 x10^3/uL


(140-400) 


 321 x10^3/uL


(140-400) 





 


Neutrophils (%) (Auto) 89 % (31-73)   91 % (31-73)  


 


Lymphocytes (%) (Auto) 7 % (24-48)   5 % (24-48)  


 


Monocytes (%) (Auto) 4 % (0-9)   4 % (0-9)  


 


Eosinophils (%) (Auto) 0 % (0-3)   0 % (0-3)  


 


Basophils (%) (Auto) 0 % (0-3)   0 % (0-3)  


 


Neutrophils # (Auto)


 10.1 x10^3/uL


(1.8-7.7) 


 9.4 x10^3/uL


(1.8-7.7) 





 


Lymphocytes # (Auto)


 0.8 x10^3/uL


(1.0-4.8) 


 0.5 x10^3/uL


(1.0-4.8) 





 


Monocytes # (Auto)


 0.5 x10^3/uL


(0.0-1.1) 


 0.4 x10^3/uL


(0.0-1.1) 





 


Eosinophils # (Auto)


 0.0 x10^3/uL


(0.0-0.7) 


 0.0 x10^3/uL


(0.0-0.7) 





 


Basophils # (Auto)


 0.0 x10^3/uL


(0.0-0.2) 


 0.0 x10^3/uL


(0.0-0.2) 





 


Sodium Level


 145 mmol/L


(136-145) 


 142 mmol/L


(136-145) 





 


Potassium Level


 3.3 mmol/L


(3.5-5.1) 


 3.9 mmol/L


(3.5-5.1) 





 


Chloride Level


 109 mmol/L


() 


 107 mmol/L


() 





 


Carbon Dioxide Level


 28 mmol/L


(21-32) 


 31 mmol/L


(21-32) 





 


Anion Gap 8 (6-14)   4 (6-14)  


 


Blood Urea Nitrogen


 19 mg/dL


(7-20) 


 21 mg/dL


(7-20) 





 


Creatinine


 0.6 mg/dL


(0.6-1.0) 


 0.6 mg/dL


(0.6-1.0) 





 


Estimated GFR


(Cockcroft-Gault) 99.1 


 


 99.1 


 





 


BUN/Creatinine Ratio 32 (6-20)   35 (6-20)  


 


Glucose Level


 78 mg/dL


(70-99) 


 123 mg/dL


(70-99) 





 


Calcium Level


 7.8 mg/dL


(8.5-10.1) 


 7.7 mg/dL


(8.5-10.1) 





 


Total Bilirubin


 0.7 mg/dL


(0.2-1.0) 


 0.6 mg/dL


(0.2-1.0) 





 


Aspartate Amino Transf


(AST/SGOT) 31 U/L (15-37) 


 


 26 U/L (15-37) 


 





 


Alanine Aminotransferase


(ALT/SGPT) 34 U/L (14-59) 


 


 31 U/L (14-59) 


 





 


Alkaline Phosphatase


 60 U/L


() 


 59 U/L


() 





 


Total Protein


 5.2 g/dL


(6.4-8.2) 


 4.9 g/dL


(6.4-8.2) 





 


Albumin


 2.0 g/dL


(3.4-5.0) 


 2.0 g/dL


(3.4-5.0) 





 


Albumin/Globulin Ratio 0.6 (1.0-1.7)   0.7 (1.0-1.7)  


 


Urine Collection Type  Unknown   


 


Urine Color  Yellow   


 


Urine Clarity  Clear   


 


Urine pH  5.5 (<5.0-8.0)   


 


Urine Specific Gravity


 


 >=1.030


(1.000-1.030) 


 





 


Urine Protein


 


 30 mg/dL


(NEG-TRACE) 


 





 


Urine Glucose (UA)


 


 250 mg/dL


(NEG) 


 





 


Urine Ketones (Stick)


 


 Trace mg/dL


(NEG) 


 





 


Urine Blood  Trace (NEG)   


 


Urine Nitrite  Negative (NEG)   


 


Urine Bilirubin  Negative (NEG)   


 


Urine Urobilinogen Dipstick


 


 0.2 mg/dL (0.2


mg/dL) 


 





 


Urine Leukocyte Esterase  Small (NEG)   


 


Urine RBC  0 /HPF (0-2)   


 


Urine WBC  Occ /HPF (0-4)   


 


Urine Squamous Epithelial


Cells 


 Mod /LPF 


 


 





 


Urine Bacteria


 


 Few /HPF


(0-FEW) 


 





 


Urine Yeast  Present /HPF   


 


O2 Saturation    88 % (92-99) 


 


Arterial Blood pH


 


 


 


 7.49


(7.35-7.45)


 


Arterial Blood pCO2 at


Patient Temp 


 


 


 36 mmHg


(35-46)


 


Arterial Blood pO2 at Patient


Temp 


 


 


 52 mmHg


()


 


Arterial Blood HCO3


 


 


 


 27 mmol/L


(21-28)


 


Arterial Blood Base Excess


 


 


 


 3 mmol/L


(-3-3)


 


FiO2    50 








Laboratory Tests








Test


 8/9/20


14:15 8/10/20


04:05 8/10/20


07:30


 


Urine Collection Type Unknown   


 


Urine Color Yellow   


 


Urine Clarity Clear   


 


Urine pH 5.5 (<5.0-8.0)   


 


Urine Specific Gravity


 >=1.030


(1.000-1.030) 


 





 


Urine Protein


 30 mg/dL


(NEG-TRACE) 


 





 


Urine Glucose (UA)


 250 mg/dL


(NEG) 


 





 


Urine Ketones (Stick)


 Trace mg/dL


(NEG) 


 





 


Urine Blood Trace (NEG)   


 


Urine Nitrite Negative (NEG)   


 


Urine Bilirubin Negative (NEG)   


 


Urine Urobilinogen Dipstick


 0.2 mg/dL (0.2


mg/dL) 


 





 


Urine Leukocyte Esterase Small (NEG)   


 


Urine RBC 0 /HPF (0-2)   


 


Urine WBC Occ /HPF (0-4)   


 


Urine Squamous Epithelial


Cells Mod /LPF 


 


 





 


Urine Bacteria


 Few /HPF


(0-FEW) 


 





 


Urine Yeast Present /HPF   


 


White Blood Count


 


 10.3 x10^3/uL


(4.0-11.0) 





 


Red Blood Count


 


 4.24 x10^6/uL


(3.50-5.40) 





 


Hemoglobin


 


 12.4 g/dL


(12.0-15.5) 





 


Hematocrit


 


 37.6 %


(36.0-47.0) 





 


Mean Corpuscular Volume  89 fL ()  


 


Mean Corpuscular Hemoglobin  29 pg (25-35)  


 


Mean Corpuscular Hemoglobin


Concent 


 33 g/dL


(31-37) 





 


Red Cell Distribution Width


 


 13.6 %


(11.5-14.5) 





 


Platelet Count


 


 321 x10^3/uL


(140-400) 





 


Neutrophils (%) (Auto)  91 % (31-73)  


 


Lymphocytes (%) (Auto)  5 % (24-48)  


 


Monocytes (%) (Auto)  4 % (0-9)  


 


Eosinophils (%) (Auto)  0 % (0-3)  


 


Basophils (%) (Auto)  0 % (0-3)  


 


Neutrophils # (Auto)


 


 9.4 x10^3/uL


(1.8-7.7) 





 


Lymphocytes # (Auto)


 


 0.5 x10^3/uL


(1.0-4.8) 





 


Monocytes # (Auto)


 


 0.4 x10^3/uL


(0.0-1.1) 





 


Eosinophils # (Auto)


 


 0.0 x10^3/uL


(0.0-0.7) 





 


Basophils # (Auto)


 


 0.0 x10^3/uL


(0.0-0.2) 





 


Sodium Level


 


 142 mmol/L


(136-145) 





 


Potassium Level


 


 3.9 mmol/L


(3.5-5.1) 





 


Chloride Level


 


 107 mmol/L


() 





 


Carbon Dioxide Level


 


 31 mmol/L


(21-32) 





 


Anion Gap  4 (6-14)  


 


Blood Urea Nitrogen


 


 21 mg/dL


(7-20) 





 


Creatinine


 


 0.6 mg/dL


(0.6-1.0) 





 


Estimated GFR


(Cockcroft-Gault) 


 99.1 


 





 


BUN/Creatinine Ratio  35 (6-20)  


 


Glucose Level


 


 123 mg/dL


(70-99) 





 


Calcium Level


 


 7.7 mg/dL


(8.5-10.1) 





 


Total Bilirubin


 


 0.6 mg/dL


(0.2-1.0) 





 


Aspartate Amino Transf


(AST/SGOT) 


 26 U/L (15-37) 


 





 


Alanine Aminotransferase


(ALT/SGPT) 


 31 U/L (14-59) 


 





 


Alkaline Phosphatase


 


 59 U/L


() 





 


Total Protein


 


 4.9 g/dL


(6.4-8.2) 





 


Albumin


 


 2.0 g/dL


(3.4-5.0) 





 


Albumin/Globulin Ratio  0.7 (1.0-1.7)  


 


O2 Saturation   88 % (92-99) 


 


Arterial Blood pH


 


 


 7.49


(7.35-7.45)


 


Arterial Blood pCO2 at


Patient Temp 


 


 36 mmHg


(35-46)


 


Arterial Blood pO2 at Patient


Temp 


 


 52 mmHg


()


 


Arterial Blood HCO3


 


 


 27 mmol/L


(21-28)


 


Arterial Blood Base Excess


 


 


 3 mmol/L


(-3-3)


 


FiO2   50 








Comments


CXR, reviewed 


 


Bilateral pneumonia, more confluent in the right upper lobe.





Impression


.


IMPRESSION:


1.  Acute hypoxic respiratory failure secondary to COVID-19 pneumonia and acute


lung injury.


2.  No significant tobacco history.


3.  Abnormal chest x-ray consistent with COVID-19 pneumonia.


4.  Mildly increased liver function tests.


5. hypoxemia


6. fever





Plan


.


RECOMMENDATIONS:


1.  Continue present vapotherm, setting reviewed, titrate FiO2 to keep sat 92%. 


2.  Monitor the course of treatment.  


3.  Cont. antibiotics. Continue Zosyn (started 8/5), added Zyvox 


4.  solumedrol to 40 bid 


5.  Monitor for inflammatory markers, D-Dimer 0.87


6.  High dose DVT prophylaxis.


7.  S/P convalescent plasma on 8/5/2020 and remdesivir.


8.  Discussed with RN and RT.











MAURI GALLAGHER MD                 Aug 10, 2020 10:20

## 2020-08-11 VITALS — DIASTOLIC BLOOD PRESSURE: 48 MMHG | SYSTOLIC BLOOD PRESSURE: 131 MMHG

## 2020-08-11 VITALS — SYSTOLIC BLOOD PRESSURE: 135 MMHG | DIASTOLIC BLOOD PRESSURE: 67 MMHG

## 2020-08-11 VITALS — SYSTOLIC BLOOD PRESSURE: 141 MMHG | DIASTOLIC BLOOD PRESSURE: 68 MMHG

## 2020-08-11 VITALS — DIASTOLIC BLOOD PRESSURE: 64 MMHG | SYSTOLIC BLOOD PRESSURE: 136 MMHG

## 2020-08-11 VITALS — DIASTOLIC BLOOD PRESSURE: 50 MMHG | SYSTOLIC BLOOD PRESSURE: 113 MMHG

## 2020-08-11 VITALS — DIASTOLIC BLOOD PRESSURE: 52 MMHG | SYSTOLIC BLOOD PRESSURE: 127 MMHG

## 2020-08-11 VITALS — DIASTOLIC BLOOD PRESSURE: 69 MMHG | SYSTOLIC BLOOD PRESSURE: 133 MMHG

## 2020-08-11 VITALS — SYSTOLIC BLOOD PRESSURE: 141 MMHG | DIASTOLIC BLOOD PRESSURE: 65 MMHG

## 2020-08-11 VITALS — DIASTOLIC BLOOD PRESSURE: 61 MMHG | SYSTOLIC BLOOD PRESSURE: 133 MMHG

## 2020-08-11 VITALS — SYSTOLIC BLOOD PRESSURE: 134 MMHG | DIASTOLIC BLOOD PRESSURE: 57 MMHG

## 2020-08-11 VITALS — DIASTOLIC BLOOD PRESSURE: 62 MMHG | SYSTOLIC BLOOD PRESSURE: 132 MMHG

## 2020-08-11 VITALS — DIASTOLIC BLOOD PRESSURE: 61 MMHG | SYSTOLIC BLOOD PRESSURE: 119 MMHG

## 2020-08-11 VITALS — SYSTOLIC BLOOD PRESSURE: 119 MMHG | DIASTOLIC BLOOD PRESSURE: 63 MMHG

## 2020-08-11 VITALS — SYSTOLIC BLOOD PRESSURE: 131 MMHG | DIASTOLIC BLOOD PRESSURE: 59 MMHG

## 2020-08-11 VITALS — SYSTOLIC BLOOD PRESSURE: 120 MMHG | DIASTOLIC BLOOD PRESSURE: 62 MMHG

## 2020-08-11 VITALS — DIASTOLIC BLOOD PRESSURE: 62 MMHG | SYSTOLIC BLOOD PRESSURE: 131 MMHG

## 2020-08-11 VITALS — SYSTOLIC BLOOD PRESSURE: 139 MMHG | DIASTOLIC BLOOD PRESSURE: 65 MMHG

## 2020-08-11 VITALS — SYSTOLIC BLOOD PRESSURE: 119 MMHG | DIASTOLIC BLOOD PRESSURE: 56 MMHG

## 2020-08-11 VITALS — SYSTOLIC BLOOD PRESSURE: 127 MMHG | DIASTOLIC BLOOD PRESSURE: 55 MMHG

## 2020-08-11 VITALS — SYSTOLIC BLOOD PRESSURE: 122 MMHG | DIASTOLIC BLOOD PRESSURE: 55 MMHG

## 2020-08-11 VITALS — DIASTOLIC BLOOD PRESSURE: 59 MMHG | SYSTOLIC BLOOD PRESSURE: 127 MMHG

## 2020-08-11 VITALS — DIASTOLIC BLOOD PRESSURE: 53 MMHG | SYSTOLIC BLOOD PRESSURE: 111 MMHG

## 2020-08-11 VITALS — SYSTOLIC BLOOD PRESSURE: 122 MMHG | DIASTOLIC BLOOD PRESSURE: 64 MMHG

## 2020-08-11 VITALS — DIASTOLIC BLOOD PRESSURE: 70 MMHG | SYSTOLIC BLOOD PRESSURE: 135 MMHG

## 2020-08-11 LAB
BASE EXCESS ABG: 3 MMOL/L (ref -3–3)
HCO3 BLDA-SCNC: 27 MMOL/L (ref 21–28)
INSPIRATION SETTING TIME VENT: (no result)
PCO2 BLDA: 38 MMHG (ref 35–46)
PO2 BLDA: 62 MMHG (ref 65–108)
SAO2 % BLDA: 92 % (ref 92–99)

## 2020-08-11 PROCEDURE — 5A09357 ASSISTANCE WITH RESPIRATORY VENTILATION, LESS THAN 24 CONSECUTIVE HOURS, CONTINUOUS POSITIVE AIRWAY PRESSURE: ICD-10-PCS | Performed by: INTERNAL MEDICINE

## 2020-08-11 RX ADMIN — MULTIPLE VITAMINS W/ MINERALS TAB SCH TAB: TAB at 08:07

## 2020-08-11 RX ADMIN — METHYLPREDNISOLONE SODIUM SUCCINATE SCH MG: 40 INJECTION, POWDER, FOR SOLUTION INTRAMUSCULAR; INTRAVENOUS at 08:07

## 2020-08-11 RX ADMIN — BENZONATATE SCH MG: 100 CAPSULE, LIQUID FILLED ORAL at 21:40

## 2020-08-11 RX ADMIN — PIPERACILLIN SODIUM AND TAZOBACTAM SODIUM SCH MLS/HR: 3; .375 INJECTION, POWDER, LYOPHILIZED, FOR SOLUTION INTRAVENOUS at 18:00

## 2020-08-11 RX ADMIN — Medication SCH CAP: at 08:07

## 2020-08-11 RX ADMIN — Medication SCH CAP: at 21:18

## 2020-08-11 RX ADMIN — PIPERACILLIN SODIUM AND TAZOBACTAM SODIUM SCH MLS/HR: 3; .375 INJECTION, POWDER, LYOPHILIZED, FOR SOLUTION INTRAVENOUS at 00:04

## 2020-08-11 RX ADMIN — BENZONATATE SCH MG: 100 CAPSULE, LIQUID FILLED ORAL at 06:16

## 2020-08-11 RX ADMIN — LINEZOLID SCH MG: 600 TABLET, FILM COATED ORAL at 08:08

## 2020-08-11 RX ADMIN — WATER PRN ML: 1 INJECTION INTRAVENOUS at 17:57

## 2020-08-11 RX ADMIN — PIPERACILLIN SODIUM AND TAZOBACTAM SODIUM SCH MLS/HR: 3; .375 INJECTION, POWDER, LYOPHILIZED, FOR SOLUTION INTRAVENOUS at 12:09

## 2020-08-11 RX ADMIN — ENOXAPARIN SODIUM SCH MG: 40 INJECTION SUBCUTANEOUS at 21:18

## 2020-08-11 RX ADMIN — LINEZOLID SCH MG: 600 TABLET, FILM COATED ORAL at 21:18

## 2020-08-11 RX ADMIN — WATER PRN ML: 1 INJECTION INTRAVENOUS at 07:48

## 2020-08-11 RX ADMIN — METHYLPREDNISOLONE SODIUM SUCCINATE SCH MG: 40 INJECTION, POWDER, FOR SOLUTION INTRAMUSCULAR; INTRAVENOUS at 21:17

## 2020-08-11 RX ADMIN — PIPERACILLIN SODIUM AND TAZOBACTAM SODIUM SCH MLS/HR: 3; .375 INJECTION, POWDER, LYOPHILIZED, FOR SOLUTION INTRAVENOUS at 23:39

## 2020-08-11 RX ADMIN — ENOXAPARIN SODIUM SCH MG: 40 INJECTION SUBCUTANEOUS at 08:07

## 2020-08-11 RX ADMIN — BENZONATATE SCH MG: 100 CAPSULE, LIQUID FILLED ORAL at 14:00

## 2020-08-11 RX ADMIN — PIPERACILLIN SODIUM AND TAZOBACTAM SODIUM SCH MLS/HR: 3; .375 INJECTION, POWDER, LYOPHILIZED, FOR SOLUTION INTRAVENOUS at 06:16

## 2020-08-11 RX ADMIN — ZINC SULFATE CAP 220 MG (50 MG ELEMENTAL ZN) SCH MG: 220 (50 ZN) CAP at 08:07

## 2020-08-11 NOTE — PDOC
PULMONARY PROGRESS NOTES


DATE: 8/11/20 


TIME: 10:26


Subjective


on vapotherm, sob better, has occ cough, no pain, 


gets sob easily


Vitals





Vital Signs








  Date Time  Temp Pulse Resp B/P (MAP) Pulse Ox O2 Delivery O2 Flow Rate FiO2


 


8/11/20 10:17  67 15 111/53 (72) 98 BiPAP/CPAP  


 


8/11/20 08:00 97.8       





 97.8       


 


8/11/20 07:49       40.0 








Comments


on vapotherm


appears comfortable


no paradoxical abd  motion


no audible wheezing


alert


RRR, 


no edema 


No rash


ROS:  No Nausea, No Chest Pain, No Abdominal Pain, No Increase Cough


Labs





Laboratory Tests








Test


 8/9/20


14:15 8/10/20


04:05 8/10/20


07:30 8/11/20


08:00


 


Urine Collection Type Unknown    


 


Urine Color Yellow    


 


Urine Clarity Clear    


 


Urine pH 5.5 (<5.0-8.0)    


 


Urine Specific Gravity


 >=1.030


(1.000-1.030) 


 


 





 


Urine Protein


 30 mg/dL


(NEG-TRACE) 


 


 





 


Urine Glucose (UA)


 250 mg/dL


(NEG) 


 


 





 


Urine Ketones (Stick)


 Trace mg/dL


(NEG) 


 


 





 


Urine Blood Trace (NEG)    


 


Urine Nitrite Negative (NEG)    


 


Urine Bilirubin Negative (NEG)    


 


Urine Urobilinogen Dipstick


 0.2 mg/dL (0.2


mg/dL) 


 


 





 


Urine Leukocyte Esterase Small (NEG)    


 


Urine RBC 0 /HPF (0-2)    


 


Urine WBC Occ /HPF (0-4)    


 


Urine Squamous Epithelial


Cells Mod /LPF 


 


 


 





 


Urine Bacteria


 Few /HPF


(0-FEW) 


 


 





 


Urine Yeast Present /HPF    


 


White Blood Count


 


 10.3 x10^3/uL


(4.0-11.0) 


 





 


Red Blood Count


 


 4.24 x10^6/uL


(3.50-5.40) 


 





 


Hemoglobin


 


 12.4 g/dL


(12.0-15.5) 


 





 


Hematocrit


 


 37.6 %


(36.0-47.0) 


 





 


Mean Corpuscular Volume  89 fL ()   


 


Mean Corpuscular Hemoglobin  29 pg (25-35)   


 


Mean Corpuscular Hemoglobin


Concent 


 33 g/dL


(31-37) 


 





 


Red Cell Distribution Width


 


 13.6 %


(11.5-14.5) 


 





 


Platelet Count


 


 321 x10^3/uL


(140-400) 


 





 


Neutrophils (%) (Auto)  91 % (31-73)   


 


Lymphocytes (%) (Auto)  5 % (24-48)   


 


Monocytes (%) (Auto)  4 % (0-9)   


 


Eosinophils (%) (Auto)  0 % (0-3)   


 


Basophils (%) (Auto)  0 % (0-3)   


 


Neutrophils # (Auto)


 


 9.4 x10^3/uL


(1.8-7.7) 


 





 


Lymphocytes # (Auto)


 


 0.5 x10^3/uL


(1.0-4.8) 


 





 


Monocytes # (Auto)


 


 0.4 x10^3/uL


(0.0-1.1) 


 





 


Eosinophils # (Auto)


 


 0.0 x10^3/uL


(0.0-0.7) 


 





 


Basophils # (Auto)


 


 0.0 x10^3/uL


(0.0-0.2) 


 





 


Sodium Level


 


 142 mmol/L


(136-145) 


 





 


Potassium Level


 


 3.9 mmol/L


(3.5-5.1) 


 





 


Chloride Level


 


 107 mmol/L


() 


 





 


Carbon Dioxide Level


 


 31 mmol/L


(21-32) 


 





 


Anion Gap  4 (6-14)   


 


Blood Urea Nitrogen


 


 21 mg/dL


(7-20) 


 





 


Creatinine


 


 0.6 mg/dL


(0.6-1.0) 


 





 


Estimated GFR


(Cockcroft-Gault) 


 99.1 


 


 





 


BUN/Creatinine Ratio  35 (6-20)   


 


Glucose Level


 


 123 mg/dL


(70-99) 


 





 


Calcium Level


 


 7.7 mg/dL


(8.5-10.1) 


 





 


Total Bilirubin


 


 0.6 mg/dL


(0.2-1.0) 


 





 


Aspartate Amino Transf


(AST/SGOT) 


 26 U/L (15-37) 


 


 





 


Alanine Aminotransferase


(ALT/SGPT) 


 31 U/L (14-59) 


 


 





 


Alkaline Phosphatase


 


 59 U/L


() 


 





 


Total Protein


 


 4.9 g/dL


(6.4-8.2) 


 





 


Albumin


 


 2.0 g/dL


(3.4-5.0) 


 





 


Albumin/Globulin Ratio  0.7 (1.0-1.7)   


 


O2 Saturation   88 % (92-99)  92 % (92-99) 


 


Arterial Blood pH


 


 


 7.49


(7.35-7.45) 7.47


(7.35-7.45)


 


Arterial Blood pCO2 at


Patient Temp 


 


 36 mmHg


(35-46) 38 mmHg


(35-46)


 


Arterial Blood pO2 at Patient


Temp 


 


 52 mmHg


() 62 mmHg


()


 


Arterial Blood HCO3


 


 


 27 mmol/L


(21-28) 27 mmol/L


(21-28)


 


Arterial Blood Base Excess


 


 


 3 mmol/L


(-3-3) 3 mmol/L


(-3-3)


 


FiO2   50  60/bipap 








Laboratory Tests








Test


 8/11/20


08:00


 


O2 Saturation 92 % (92-99) 


 


Arterial Blood pH


 7.47


(7.35-7.45)


 


Arterial Blood pCO2 at


Patient Temp 38 mmHg


(35-46)


 


Arterial Blood pO2 at Patient


Temp 62 mmHg


()


 


Arterial Blood HCO3


 27 mmol/L


(21-28)


 


Arterial Blood Base Excess


 3 mmol/L


(-3-3)


 


FiO2 60/bipap 








Comments


CXR, reviewed 


 


Bilateral pneumonia, more confluent in the right upper lobe.





Impression


.


IMPRESSION:


1.  Acute hypoxic respiratory failure secondary to COVID-19 pneumonia and acute


lung injury.


2.  No significant tobacco history.


3.  Abnormal chest x-ray consistent with COVID-19 pneumonia.


4.  Mildly increased liver function tests.


5. hypoxemia


6. fever ,resolved





Plan


.


RECOMMENDATIONS:


1.  Continue present vapotherm, setting reviewed, titrate FiO2 to keep sat 92%. 

BIPAP qhs


2.  Monitor the course of treatment.  


3.  Cont. antibiotics. Continue Zosyn (started 8/5), added Zyvox 


4.  solumedrol to 40 bid 


5.  Monitor for inflammatory markers, D-Dimer 0.87


6.  High dose DVT prophylaxis.


7.  S/P convalescent plasma on 8/5/2020 and remdesivir.


8.  Discussed with RN and RT.











MAURI GALLAGHER MD                 Aug 11, 2020 10:27

## 2020-08-11 NOTE — PDOC
[facility] PROGRESS NOTE


Date of Service


DOS:


DATE: 8/11/20 


TIME: 12:18





Chief Complaint


Chief Complaint


A/P:


Acute hypoxic respiratory failuresecondary to SARSCo.2 pneumonia.  Continue 

supportive therapy, steroids room to severe protocol initiated on 8 4 will 

continue here.  Consult ID and pulmonology


SARS- COV2 pneumonia -continue IV Solu-Medrol wean O2 as tolerated I discussed 

with pulmonology to move to a negative pressure room if her desaturations worsen

and she may need BiPAP.. Will try experimental drug to severe and convalescent 

plasma therapies with ID and pulmonology.


Hypokalemiawill replace, check magnesium level


Diarrhealikely COVID-19 related.  Will check for C. difficile per protocol if 

more than 3 bowel movements in a day


Transaminitis also likely related COVID-19, will monitor


Severe protein calorie malnutritionlikely related to above





FEN - General diet


PPX - lovenox


FULL CODE


Dispo - ICU for above


CC time 38 minutes





History of Present Illness


History of Present Illness


08/11/20


Patient seen and examined in ICU


Chart reviewed


Discussed with RN


Patient is improving


No cough today


Patient on Vapotherm 





08/10/20


Patient seen and examined in ICU


Discussed with RN


Patient seems to be improving 


She has a bit of a cough


Patient has received treatment with plasma and remdesivir





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 


On 7/30/2020 she began having symptoms of diarrhea and shortness of breath and 

went to the ED to have COVID-19 testing, she was notified of the results on 

7/31/2020 and asked to quarantine at home for 14 days.  Over the course the next

5 days she became progressively more weak had fever and chills and began having 

myalgias and worsening diarrhea with little bit of abdominal pain.  She returned

to the ED on 8/4/2020 at [facility] and was found to be hypoxic.


ABG on 8/4/2020 7.472/30 5.1/94.3 with HCO3 of 26.2 on nonrebreather 10 L labs 

on 8 4 sodium 135, K3.2, chloride 99, CO2 25, BUN 17, creatinine 1, glucose 115,

lactate 1.9, calcium 8, ferritin 1491, bilirubin 0.4, , ALT 5 8, AL K 

phosphatase 62 troponin negative, .6, total protein 6.3, albumin 2.7 

prealbumin 9, lipase 185, B12 995, procalcitonin 0.40, TSH 1.427, d-dimer 1.16, 

WBC 7.1, Hb 14.1, platelets 302 chest x-ray on 8 4 read as worsening 

interstitial and airspace opacities in the right upper lobe and left lung base 

and worsening interstitial opacities in the right lung base compatible with 

multifocal pneumonia


EKG appears normal sinus rhythm with heart rate approximately 85 bpm with normal

axis some inferior T wave changes that are nonspecific no ST segment changes.





8/6: Seen in our ICU on Vapotherm.  She is still complaining of weakness and 

diarrhea to me, 2 BM this morning. Afebrile. Still with cough today. O2 53 on 

Vapotherm. Remdesivir and convalescent FFP given


8/7: O2 63 on ABG this morning after BIPAP overnight. She feels a bit improved, 

appetite improved. No diarrhea yet today. Still weak with myalgias.


8/8:Less short of breath, on BiPAP with FiO2 70%.  No chest pain or wheezing, no

fevers.  Still with some loose stools.  LFTs normal today.  WBC 10.2, Hb 12.7, 

platelets 4 2,  5K3.6 BUN 22, CR 0.6 ABG 7.48, 35, 69 BiPAP 70%.





Febrile 1 1.2 F.  Seen on BiPAP 14/6 with 50% FiO2.  She is very comfortable 

with this.  Has not had a bowel movement a day and a half.  Asking a 70 bedpan 

right now though.  Appetite is decreased.





Plan:


Already on Zosyn, have discussed with ID adding Zyvox would be appropriate.





Vitals/I&O


Vitals/I&O:





                                   Vital Signs








  Date Time  Temp Pulse Resp B/P (MAP) Pulse Ox O2 Delivery O2 Flow Rate FiO2


 


8/11/20 11:21     93 Vapotherm 40.0 


 


8/11/20 11:08  63 20 119/61 (80)    


 


8/11/20 08:00 97.8       





 97.8       














                                    I & O   


 


 8/10/20 8/10/20 8/11/20





 15:00 23:00 07:00


 


Intake Total 370 ml 420 ml 200 ml


 


Balance 370 ml 420 ml 200 ml











Physical Exam


Physical Exam:


GENERAL:  Propped up in bed, alert, calm, on BiPAP


HEENT:  PERRL. Oral cavity dry 


NECK:  Supple


LUNGS:  Diminished aeration, no accessory muscle use 


HEART:  S1, S2 regular.


ABDOMEN:  Soft and nontender 


EXTREMITIES:  No edema or cyanosis. SCDs bilaterally 


SKIN:  No signs of rash.


NEUROLOGIC:  Alert and oriented, 


PIV


General:  Alert, Oriented X3, Cooperative, moderate distress


Heart:  Regular rate, Other (Rhonchi bilaterally)


Abdomen:  Normal bowel sounds, Soft, No tenderness, No hepatosplenomegaly, No 

masses


Extremities:  No clubbing, No cyanosis, No edema, Normal pulses, No tenderne

ss/swelling


Skin:  No rashes, No breakdown, No significant lesion





Labs


Labs:





Laboratory Tests








Test


 8/11/20


08:00


 


O2 Saturation 92 % (92-99) 


 


Arterial Blood pH


 7.47


(7.35-7.45)


 


Arterial Blood pCO2 at


Patient Temp 38 mmHg


(35-46)


 


Arterial Blood pO2 at Patient


Temp 62 mmHg


()


 


Arterial Blood HCO3


 27 mmol/L


(21-28)


 


Arterial Blood Base Excess


 3 mmol/L


(-3-3)


 


FiO2 60/bipap 











Assessment and Plan


Assessmemt and Plan


Assessment:


Acute hypoxic respiratory failuresecondary to SARSCo.2 pneumonia.  Continue 

supportive therapy, steroids room to severe protocol initiated on 8 4 will 

continue here.  Consult ID and pulmonology


SARS- COV2 pneumonia -continue IV Solu-Medrol wean O2 as tolerated I discussed 

with pulmonology to move to a negative pressure room if her desaturations worsen

and she may need BiPAP.. Will try experimental drug to severe and convalescent 

plasma therapies with ID and pulmonology.


Hypokalemiawill replace, check magnesium level


Diarrhealikely COVID-19 related.  Will check for C. difficile per protocol if 

more than 3 bowel movements in a day


Transaminitis also likely related COVID-19, will monitor


Severe protein calorie malnutritionlikely related to above





Plan:


ICU monitoring


Full code


Continue IV antibiotics


Continue multivitamins 


DVT prophylaxis


Appreciate subspecialist input





Comment


Review of Relevant


I have reviewed the following items anum (where applicable) has been applied.


Medications:





Current Medications








 Medications


  (Trade)  Dose


 Ordered  Sig/Gaby


 Route


 PRN Reason  Start Time


 Stop Time Status Last Admin


Dose Admin


 


 Multivitamins


  (Thera M Plus)  1 tab  DAILY


 PO


   8/10/20 15:00


    8/11/20 08:07














Justicifation of Admission Dx:


Justifications for Admission:


Justification of Admission Dx:  Yes


Respiratory Failure:  Severe Resp Distress











VELASQUEZ ZIMMER III DO           Aug 11, 2020 12:23

## 2020-08-11 NOTE — PDOC
Infectious Disease Note


Subjective:


Subjective


Pt feels better


on vapotherm


no other complaints





Vital Signs:


Vital Signs





Vital Signs








  Date Time  Temp Pulse Resp B/P (MAP) Pulse Ox O2 Delivery O2 Flow Rate FiO2


 


8/11/20 11:21     93 Vapotherm 40.0 


 


8/11/20 11:08  63 20 119/61 (80)    


 


8/11/20 08:00 97.8       





 97.8       











Physical Exam:


PHYSICAL EXAM


GENERAL:  Propped up in bed, alert, calm, on BiPAP


HEENT:  PERRL. Oral cavity dry 


NECK:  Supple


LUNGS:  Diminished aeration, no accessory muscle use 


HEART:  S1, S2 regular.


ABDOMEN:  Soft and nontender 


EXTREMITIES:  No edema or cyanosis. SCDs bilaterally 


SKIN:  No signs of rash.


NEUROLOGIC:  Alert and oriented, 


PIV





Medications:


Inpatient Meds:





Current Medications








 Medications


  (Trade)  Dose


 Ordered  Sig/Gaby  Start Time


 Stop Time Status Last Admin


Dose Admin


 


 Acetaminophen


  (Tylenol)  650 mg  PRN Q4HRS  PRN  8/5/20 10:30


    8/9/20 07:59


650 MG


 


 Benzonatate


  (Tessalon Perle)  200 mg  Q8HRS  8/10/20 12:00


    8/11/20 06:16


200 MG


 


 Enoxaparin Sodium


  (Lovenox 40mg


 Syringe)  40 mg  Q12HR  8/5/20 10:30


    8/11/20 08:07


40 MG


 


 Guaifenesin


  (Robitussin Dm)  10 ml  PRN Q6HRS  PRN  8/5/20 10:45


     





 


 Lactobacillus


 Rhamnosus


  (Culturelle)  1 cap  BID  8/6/20 21:00


    8/11/20 08:07


1 CAP


 


 Linezolid


  (Zyvox)  600 mg  BID  8/9/20 10:00


    8/11/20 08:08


600 MG


 


 Methylprednisolone


 Sodium Succinate


  (SOLU-Medrol


 40MG VIAL)  40 mg  Q12HR  8/8/20 21:00


    8/11/20 08:07


40 MG


 


 Multivitamins


  (Thera M Plus)  1 tab  DAILY  8/10/20 15:00


    8/11/20 08:07


1 TAB


 


 Non-Formulary


 Medication 1 ea/


 Sodium Chloride  230 ml @ 


 460 mls/hr  Q24H  8/5/20 13:00


 8/8/20 13:29 DC 8/8/20 14:30


460 MLS/HR


 


 Ondansetron HCl


  (Zofran)  4 mg  PRN Q4HRS  PRN  8/5/20 10:30


     





 


 Piperacillin Sod/


 Tazobactam Sod


  (Zosyn Per


 Pharmacy)  1 each  PRN DAILY  PRN  8/5/20 11:00


     





 


 Piperacillin Sod/


 Tazobactam Sod


 3.375 gm/Sodium


 Chloride  50 ml @ 


 100 mls/hr  Q6HRS  8/5/20 12:00


    8/11/20 06:16


100 MLS/HR


 


 Potassium


 Chloride/Water  100 ml @ 


 100 mls/hr  Q1H  8/9/20 10:00


 8/9/20 11:59 DC 8/9/20 10:54


100 MLS/HR


 


 Sterile Water


  (WATER for RESP)  1,000 ml  CONT  PRN  8/5/20 16:15


    8/11/20 07:48


1,000 ML


 


 Zinc Sulfate


  (Orazinc)  220 mg  DAILY  8/5/20 11:00


    8/11/20 08:07


220 MG


 


 Zolpidem Tartrate


  (Ambien)  5 mg  PRN QHS  PRN  8/5/20 10:30


     














Labs:


Lab





Laboratory Tests








Test


 8/11/20


08:00


 


O2 Saturation 92 % (92-99) 


 


Arterial Blood pH


 7.47


(7.35-7.45)


 


Arterial Blood pCO2 at


Patient Temp 38 mmHg


(35-46)


 


Arterial Blood pO2 at Patient


Temp 62 mmHg


()


 


Arterial Blood HCO3


 27 mmol/L


(21-28)


 


Arterial Blood Base Excess


 3 mmol/L


(-3-3)


 


FiO2 60/bipap 











Objective:


Assessment:


Fever


Leukocytosis, mild - on steroids 


COVID-19 positive. s/p convalescent plasma and Remdesivir. 


Bilateral pulmonary infiltrate.


Hypoxemia. On BiPAP 


Obesity.





Plan:


Plan of Care


cont supportive care


Continue Zosyn ( 8/5),  Zyvox (8/9)


Steroids


Convalescent plasma and Remdesivir given


Probiotics 


Maintain aspiration precautions


Airborne isolation for positive COVID


F/U temp/labs


f/u cults  


 


d/w BRET SOSA MD           Aug 11, 2020 12:10

## 2020-08-11 NOTE — NUR
SS following for discharge planning. SS reviewed pt chart and discussed with pt RN. Pt is 
currently on the BIPAP HS and Vapotherm daily. Pt on IV Zosyn. COVID19 positive. SS will 
continue to follow for discharge planning.

## 2020-08-12 VITALS — SYSTOLIC BLOOD PRESSURE: 121 MMHG | DIASTOLIC BLOOD PRESSURE: 65 MMHG

## 2020-08-12 VITALS — DIASTOLIC BLOOD PRESSURE: 66 MMHG | SYSTOLIC BLOOD PRESSURE: 139 MMHG

## 2020-08-12 VITALS — DIASTOLIC BLOOD PRESSURE: 63 MMHG | SYSTOLIC BLOOD PRESSURE: 126 MMHG

## 2020-08-12 VITALS — DIASTOLIC BLOOD PRESSURE: 70 MMHG | SYSTOLIC BLOOD PRESSURE: 143 MMHG

## 2020-08-12 VITALS — SYSTOLIC BLOOD PRESSURE: 132 MMHG | DIASTOLIC BLOOD PRESSURE: 62 MMHG

## 2020-08-12 VITALS — DIASTOLIC BLOOD PRESSURE: 67 MMHG | SYSTOLIC BLOOD PRESSURE: 126 MMHG

## 2020-08-12 VITALS — DIASTOLIC BLOOD PRESSURE: 66 MMHG | SYSTOLIC BLOOD PRESSURE: 124 MMHG

## 2020-08-12 VITALS — DIASTOLIC BLOOD PRESSURE: 66 MMHG | SYSTOLIC BLOOD PRESSURE: 128 MMHG

## 2020-08-12 VITALS — SYSTOLIC BLOOD PRESSURE: 112 MMHG | DIASTOLIC BLOOD PRESSURE: 54 MMHG

## 2020-08-12 VITALS — SYSTOLIC BLOOD PRESSURE: 103 MMHG | DIASTOLIC BLOOD PRESSURE: 60 MMHG

## 2020-08-12 VITALS — SYSTOLIC BLOOD PRESSURE: 118 MMHG | DIASTOLIC BLOOD PRESSURE: 62 MMHG

## 2020-08-12 VITALS — DIASTOLIC BLOOD PRESSURE: 56 MMHG | SYSTOLIC BLOOD PRESSURE: 119 MMHG

## 2020-08-12 VITALS — DIASTOLIC BLOOD PRESSURE: 58 MMHG | SYSTOLIC BLOOD PRESSURE: 117 MMHG

## 2020-08-12 VITALS — SYSTOLIC BLOOD PRESSURE: 110 MMHG | DIASTOLIC BLOOD PRESSURE: 64 MMHG

## 2020-08-12 VITALS — SYSTOLIC BLOOD PRESSURE: 109 MMHG | DIASTOLIC BLOOD PRESSURE: 56 MMHG

## 2020-08-12 VITALS — DIASTOLIC BLOOD PRESSURE: 65 MMHG | SYSTOLIC BLOOD PRESSURE: 134 MMHG

## 2020-08-12 VITALS — SYSTOLIC BLOOD PRESSURE: 132 MMHG | DIASTOLIC BLOOD PRESSURE: 58 MMHG

## 2020-08-12 VITALS — DIASTOLIC BLOOD PRESSURE: 70 MMHG | SYSTOLIC BLOOD PRESSURE: 127 MMHG

## 2020-08-12 VITALS — DIASTOLIC BLOOD PRESSURE: 68 MMHG | SYSTOLIC BLOOD PRESSURE: 132 MMHG

## 2020-08-12 VITALS — DIASTOLIC BLOOD PRESSURE: 56 MMHG | SYSTOLIC BLOOD PRESSURE: 120 MMHG

## 2020-08-12 VITALS — DIASTOLIC BLOOD PRESSURE: 61 MMHG | SYSTOLIC BLOOD PRESSURE: 120 MMHG

## 2020-08-12 VITALS — SYSTOLIC BLOOD PRESSURE: 119 MMHG | DIASTOLIC BLOOD PRESSURE: 64 MMHG

## 2020-08-12 VITALS — DIASTOLIC BLOOD PRESSURE: 63 MMHG | SYSTOLIC BLOOD PRESSURE: 117 MMHG

## 2020-08-12 VITALS — SYSTOLIC BLOOD PRESSURE: 105 MMHG | DIASTOLIC BLOOD PRESSURE: 58 MMHG

## 2020-08-12 LAB
ANION GAP SERPL CALC-SCNC: 4 MMOL/L (ref 6–14)
BASOPHILS # BLD AUTO: 0 X10^3/UL (ref 0–0.2)
BASOPHILS NFR BLD: 0 % (ref 0–3)
BUN SERPL-MCNC: 21 MG/DL (ref 7–20)
CALCIUM SERPL-MCNC: 7.6 MG/DL (ref 8.5–10.1)
CHLORIDE SERPL-SCNC: 107 MMOL/L (ref 98–107)
CO2 SERPL-SCNC: 30 MMOL/L (ref 21–32)
CREAT SERPL-MCNC: 0.7 MG/DL (ref 0.6–1)
EOSINOPHIL NFR BLD: 0 % (ref 0–3)
EOSINOPHIL NFR BLD: 0 X10^3/UL (ref 0–0.7)
ERYTHROCYTE [DISTWIDTH] IN BLOOD BY AUTOMATED COUNT: 14 % (ref 11.5–14.5)
GFR SERPLBLD BASED ON 1.73 SQ M-ARVRAT: 83 ML/MIN
GLUCOSE SERPL-MCNC: 87 MG/DL (ref 70–99)
HCT VFR BLD CALC: 38.8 % (ref 36–47)
HGB BLD-MCNC: 13.3 G/DL (ref 12–15.5)
LYMPHOCYTES # BLD: 0.6 X10^3/UL (ref 1–4.8)
LYMPHOCYTES NFR BLD AUTO: 6 % (ref 24–48)
MCH RBC QN AUTO: 30 PG (ref 25–35)
MCHC RBC AUTO-ENTMCNC: 34 G/DL (ref 31–37)
MCV RBC AUTO: 89 FL (ref 79–100)
MONO #: 0.7 X10^3/UL (ref 0–1.1)
MONOCYTES NFR BLD: 6 % (ref 0–9)
NEUT #: 9.3 X10^3/UL (ref 1.8–7.7)
NEUTROPHILS NFR BLD AUTO: 88 % (ref 31–73)
PLATELET # BLD AUTO: 311 X10^3/UL (ref 140–400)
POTASSIUM SERPL-SCNC: 4.2 MMOL/L (ref 3.5–5.1)
RBC # BLD AUTO: 4.37 X10^6/UL (ref 3.5–5.4)
SODIUM SERPL-SCNC: 141 MMOL/L (ref 136–145)
WBC # BLD AUTO: 10.6 X10^3/UL (ref 4–11)

## 2020-08-12 PROCEDURE — 5A09357 ASSISTANCE WITH RESPIRATORY VENTILATION, LESS THAN 24 CONSECUTIVE HOURS, CONTINUOUS POSITIVE AIRWAY PRESSURE: ICD-10-PCS | Performed by: INTERNAL MEDICINE

## 2020-08-12 RX ADMIN — WATER PRN ML: 1 INJECTION INTRAVENOUS at 06:38

## 2020-08-12 RX ADMIN — LINEZOLID SCH MG: 600 TABLET, FILM COATED ORAL at 08:19

## 2020-08-12 RX ADMIN — Medication SCH CAP: at 21:11

## 2020-08-12 RX ADMIN — BENZONATATE SCH MG: 100 CAPSULE, LIQUID FILLED ORAL at 21:11

## 2020-08-12 RX ADMIN — ZINC SULFATE CAP 220 MG (50 MG ELEMENTAL ZN) SCH MG: 220 (50 ZN) CAP at 08:20

## 2020-08-12 RX ADMIN — BENZONATATE SCH MG: 100 CAPSULE, LIQUID FILLED ORAL at 13:40

## 2020-08-12 RX ADMIN — PIPERACILLIN SODIUM AND TAZOBACTAM SODIUM SCH MLS/HR: 3; .375 INJECTION, POWDER, LYOPHILIZED, FOR SOLUTION INTRAVENOUS at 06:04

## 2020-08-12 RX ADMIN — METHYLPREDNISOLONE SODIUM SUCCINATE SCH MG: 40 INJECTION, POWDER, FOR SOLUTION INTRAMUSCULAR; INTRAVENOUS at 21:12

## 2020-08-12 RX ADMIN — MULTIPLE VITAMINS W/ MINERALS TAB SCH TAB: TAB at 08:19

## 2020-08-12 RX ADMIN — ENOXAPARIN SODIUM SCH MG: 40 INJECTION SUBCUTANEOUS at 21:11

## 2020-08-12 RX ADMIN — BENZONATATE SCH MG: 100 CAPSULE, LIQUID FILLED ORAL at 06:04

## 2020-08-12 RX ADMIN — ENOXAPARIN SODIUM SCH MG: 40 INJECTION SUBCUTANEOUS at 08:20

## 2020-08-12 RX ADMIN — METHYLPREDNISOLONE SODIUM SUCCINATE SCH MG: 40 INJECTION, POWDER, FOR SOLUTION INTRAMUSCULAR; INTRAVENOUS at 08:20

## 2020-08-12 RX ADMIN — GUAIFENESIN AND DEXTROMETHORPHAN PRN ML: 100; 10 SYRUP ORAL at 15:56

## 2020-08-12 RX ADMIN — WATER PRN ML: 1 INJECTION INTRAVENOUS at 18:04

## 2020-08-12 RX ADMIN — WATER PRN ML: 1 INJECTION INTRAVENOUS at 08:00

## 2020-08-12 RX ADMIN — Medication SCH CAP: at 08:19

## 2020-08-12 RX ADMIN — WATER PRN ML: 1 INJECTION INTRAVENOUS at 21:12

## 2020-08-12 NOTE — PDOC
Infectious Disease Note


Subjective:


Subjective


Pt feels better


on vapotherm


no other complaints





Vital Signs:


Vital Signs





Vital Signs








  Date Time  Temp Pulse Resp B/P (MAP) Pulse Ox O2 Delivery O2 Flow Rate FiO2


 


8/12/20 08:10     95 Vapotherm 40.0 


 


8/12/20 07:00  62 21 126/63 (84)    


 


8/12/20 04:00 97.0       





 97.0       











Physical Exam:


PHYSICAL EXAM


GENERAL:  Propped up in bed, alert, calm, on BiPAP


HEENT:  PERRL. Oral cavity dry 


NECK:  Supple


LUNGS:  Diminished aeration, no accessory muscle use 


HEART:  S1, S2 regular.


ABDOMEN:  Soft and nontender 


EXTREMITIES:  No edema or cyanosis. SCDs bilaterally 


SKIN:  No signs of rash.


NEUROLOGIC:  Alert and oriented, 


PIV





Medications:


Inpatient Meds:





Current Medications








 Medications


  (Trade)  Dose


 Ordered  Sig/Gaby  Start Time


 Stop Time Status Last Admin


Dose Admin


 


 Acetaminophen


  (Tylenol)  650 mg  PRN Q4HRS  PRN  8/5/20 10:30


    8/9/20 07:59


650 MG


 


 Benzonatate


  (Tessalon Perle)  200 mg  Q8HRS  8/10/20 12:00


    8/12/20 06:04


200 MG


 


 Enoxaparin Sodium


  (Lovenox 40mg


 Syringe)  40 mg  Q12HR  8/5/20 10:30


    8/12/20 08:20


40 MG


 


 Guaifenesin


  (Robitussin Dm)  10 ml  PRN Q6HRS  PRN  8/5/20 10:45


     





 


 Lactobacillus


 Rhamnosus


  (Culturelle)  1 cap  BID  8/6/20 21:00


    8/12/20 08:19


1 CAP


 


 Linezolid


  (Zyvox)  600 mg  BID  8/9/20 10:00


    8/12/20 08:19


600 MG


 


 Methylprednisolone


 Sodium Succinate


  (SOLU-Medrol


 40MG VIAL)  40 mg  Q12HR  8/8/20 21:00


    8/12/20 08:20


40 MG


 


 Multivitamins


  (Thera M Plus)  1 tab  DAILY  8/10/20 15:00


    8/12/20 08:19


1 TAB


 


 Non-Formulary


 Medication 1 ea/


 Sodium Chloride  230 ml @ 


 460 mls/hr  Q24H  8/5/20 13:00


 8/8/20 13:29 DC 8/8/20 14:30


460 MLS/HR


 


 Ondansetron HCl


  (Zofran)  4 mg  PRN Q4HRS  PRN  8/5/20 10:30


     





 


 Piperacillin Sod/


 Tazobactam Sod


  (Zosyn Per


 Pharmacy)  1 each  PRN DAILY  PRN  8/5/20 11:00


     





 


 Piperacillin Sod/


 Tazobactam Sod


 3.375 gm/Sodium


 Chloride  50 ml @ 


 100 mls/hr  Q6HRS  8/5/20 12:00


    8/12/20 06:04


100 MLS/HR


 


 Potassium


 Chloride/Water  100 ml @ 


 100 mls/hr  Q1H  8/9/20 10:00


 8/9/20 11:59 DC 8/9/20 10:54


100 MLS/HR


 


 Sterile Water


  (WATER for RESP)  1,000 ml  CONT  PRN  8/5/20 16:15


    8/12/20 08:00


1,000 ML


 


 Zinc Sulfate


  (Orazinc)  220 mg  DAILY  8/5/20 11:00


    8/12/20 08:20


220 MG


 


 Zolpidem Tartrate


  (Ambien)  5 mg  PRN QHS  PRN  8/5/20 10:30


     














Objective:


Assessment:


Fever


Leukocytosis, mild - on steroids 


COVID-19 positive. s/p convalescent plasma and Remdesivir. 


Bilateral pulmonary infiltrate.


Hypoxemia. On BiPAP 


Obesity.





Plan:


Plan of Care


cont supportive care


DC  Zosyn ( 8/5), DC Zyvox (8/9)


Steroids


Convalescent plasma and Remdesivir given


Probiotics 


Maintain aspiration precautions


Airborne isolation for positive COVID


F/U temp/labs


f/u cults  


 


d/w BRET SOSA MD           Aug 12, 2020 08:31

## 2020-08-12 NOTE — PDOC
[facility] PROGRESS NOTE


Date of Service


DOS:


DATE: 8/12/20 


TIME: 08:58





Chief Complaint


Chief Complaint


A/P:


Acute hypoxic respiratory failuresecondary to SARSCo.2 pneumonia.  Continue 

supportive therapy, steroids room to severe protocol initiated on 8 4 will 

continue here.  Consult ID and pulmonology


SARS- COV2 pneumonia -continue IV Solu-Medrol wean O2 as tolerated I discussed 

with pulmonology to move to a negative pressure room if her desaturations worsen

and she may need BiPAP.. Will try experimental drug to severe and convalescent 

plasma therapies with ID and pulmonology.


Hypokalemiawill replace, check magnesium level


Diarrhealikely COVID-19 related.  Will check for C. difficile per protocol if 

more than 3 bowel movements in a day


Transaminitis also likely related COVID-19, will monitor


Severe protein calorie malnutritionlikely related to above





FEN - General diet


PPX - lovenox


FULL CODE


Dispo - ICU for above


CC time 38 minutes





History of Present Illness


History of Present Illness


08/12/20


Patient seen and examined in ICU


Chart reviewed


Discussed with RN


Patient was smiling and looking much better


For the fist time last night she didn't require Bipap





08/11/20


Patient seen and examined in ICU


Chart reviewed


Discussed with RN


Patient is improving


No cough today


Patient on Vapotherm 





08/10/20


Patient seen and examined in ICU


Discussed with RN


Patient seems to be improving 


She has a bit of a cough


Patient has received treatment with plasma and remdesivir





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 


On 7/30/2020 she began having symptoms of diarrhea and shortness of breath and 

went to the ED to have COVID-19 testing, she was notified of the results on 

7/31/2020 and asked to quarantine at home for 14 days.  Over the course the next

5 days she became progressively more weak had fever and chills and began having 

myalgias and worsening diarrhea with little bit of abdominal pain.  She returned

to the ED on 8/4/2020 at [facility] and was found to be hypoxic.


ABG on 8/4/2020 7.472/30 5.1/94.3 with HCO3 of 26.2 on nonrebreather 10 L labs 

on 8 4 sodium 135, K3.2, chloride 99, CO2 25, BUN 17, creatinine 1, glucose 115,

lactate 1.9, calcium 8, ferritin 1491, bilirubin 0.4, , ALT 5 8, AL K 

phosphatase 62 troponin negative, .6, total protein 6.3, albumin 2.7 

prealbumin 9, lipase 185, B12 995, procalcitonin 0.40, TSH 1.427, d-dimer 1.16, 

WBC 7.1, Hb 14.1, platelets 302 chest x-ray on 8 4 read as worsening 

interstitial and airspace opacities in the right upper lobe and left lung base 

and worsening interstitial opacities in the right lung base compatible with 

multifocal pneumonia


EKG appears normal sinus rhythm with heart rate approximately 85 bpm with normal

axis some inferior T wave changes that are nonspecific no ST segment changes.





8/6: Seen in our ICU on Vapotherm.  She is still complaining of weakness and 

diarrhea to me, 2 BM this morning. Afebrile. Still with cough today. O2 53 on 

Vapotherm. Remdesivir and convalescent FFP given


8/7: O2 63 on ABG this morning after BIPAP overnight. She feels a bit improved, 

appetite improved. No diarrhea yet today. Still weak with myalgias.


8/8:Less short of breath, on BiPAP with FiO2 70%.  No chest pain or wheezing, no

fevers.  Still with some loose stools.  LFTs normal today.  WBC 10.2, Hb 12.7, 

platelets 4 2,  5K3.6 BUN 22, CR 0.6 ABG 7.48, 35, 69 BiPAP 70%.





Febrile 1 1.2 F.  Seen on BiPAP 14/6 with 50% FiO2.  She is very comfortable 

with this.  Has not had a bowel movement a day and a half.  Asking a 70 bedpan 

right now though.  Appetite is decreased.





Plan:


Already on Zosyn, have discussed with ID adding Zyvox would be appropriate.





Vitals/I&O


Vitals/I&O:





                                   Vital Signs








  Date Time  Temp Pulse Resp B/P (MAP) Pulse Ox O2 Delivery O2 Flow Rate FiO2


 


8/12/20 08:10     95 Vapotherm 40.0 


 


8/12/20 08:00 98.2 62 20 126/63 (84)    





 98.2       














                                    I & O   


 


 8/11/20 8/11/20 8/12/20





 15:00 23:00 07:00


 


Intake Total 340 ml 300 ml 250 ml


 


Balance 340 ml 300 ml 250 ml











Physical Exam


Physical Exam:


GENERAL:  Propped up in bed, alert, calm, on BiPAP


HEENT:  PERRL. Oral cavity dry 


NECK:  Supple


LUNGS:  Diminished aeration, no accessory muscle use 


HEART:  S1, S2 regular.


ABDOMEN:  Soft and nontender 


EXTREMITIES:  No edema or cyanosis. SCDs bilaterally 


SKIN:  No signs of rash.


NEUROLOGIC:  Alert and oriented, 


PIV


General:  Alert, Oriented X3, Cooperative, moderate distress


Heart:  Regular rate, Other (Rhonchi bilaterally)


Abdomen:  Normal bowel sounds, Soft, No tenderness, No hepatosplenomegaly, No 

masses


Extremities:  No clubbing, No cyanosis, No edema, Normal pulses, No 

tenderness/swelling


Skin:  No rashes, No breakdown, No significant lesion





Labs


Labs:





Laboratory Tests








Test


 8/12/20


08:15


 


White Blood Count


 10.6 x10^3/uL


(4.0-11.0)


 


Red Blood Count


 4.37 x10^6/uL


(3.50-5.40)


 


Hemoglobin


 13.3 g/dL


(12.0-15.5)


 


Hematocrit


 38.8 %


(36.0-47.0)


 


Mean Corpuscular Volume 89 fL () 


 


Mean Corpuscular Hemoglobin 30 pg (25-35) 


 


Mean Corpuscular Hemoglobin


Concent 34 g/dL


(31-37)


 


Red Cell Distribution Width


 14.0 %


(11.5-14.5)


 


Platelet Count


 311 x10^3/uL


(140-400)


 


Neutrophils (%) (Auto) 88 % (31-73) 


 


Lymphocytes (%) (Auto) 6 % (24-48) 


 


Monocytes (%) (Auto) 6 % (0-9) 


 


Eosinophils (%) (Auto) 0 % (0-3) 


 


Basophils (%) (Auto) 0 % (0-3) 


 


Neutrophils # (Auto)


 9.3 x10^3/uL


(1.8-7.7)


 


Lymphocytes # (Auto)


 0.6 x10^3/uL


(1.0-4.8)


 


Monocytes # (Auto)


 0.7 x10^3/uL


(0.0-1.1)


 


Eosinophils # (Auto)


 0.0 x10^3/uL


(0.0-0.7)


 


Basophils # (Auto)


 0.0 x10^3/uL


(0.0-0.2)


 


Sodium Level


 141 mmol/L


(136-145)


 


Potassium Level


 4.2 mmol/L


(3.5-5.1)


 


Chloride Level


 107 mmol/L


()


 


Carbon Dioxide Level


 30 mmol/L


(21-32)


 


Anion Gap 4 (6-14) 


 


Blood Urea Nitrogen


 21 mg/dL


(7-20)


 


Creatinine


 0.7 mg/dL


(0.6-1.0)


 


Estimated GFR


(Cockcroft-Gault) 83.0 





 


Glucose Level


 87 mg/dL


(70-99)


 


Calcium Level


 7.6 mg/dL


(8.5-10.1)











Assessment and Plan


Assessmemt and Plan


Assessment:


Acute hypoxic respiratory failuresecondary to SARSCo.2 pneumonia.  Continue 

supportive therapy, steroids room to severe protocol initiated on 8 4 will 

continue here.  Consult ID and pulmonology


SARS- COV2 pneumonia -continue IV Solu-Medrol wean O2 as tolerated I discussed 

with pulmonology to move to a negative pressure room if her desaturations worsen

and she may need BiPAP.. Will try experimental drug to severe and convalescent 

plasma therapies with ID and pulmonology.


Hypokalemiawill replace, check magnesium level


Diarrhealikely COVID-19 related.  Will check for C. difficile per protocol if 

more than 3 bowel movements in a day


Transaminitis also likely related COVID-19, will monitor


Severe protein calorie malnutritionlikely related to above





Plan:


ICU monitoring


Continue IV steroids


Vapotherm


DVT prophylaxis


Full code


Appreciate subspecialist input





Comment


Review of Relevant


I have reviewed the following items anum (where applicable) has been applied.





Justicifation of Admission Dx:


Justifications for Admission:


Justification of Admission Dx:  Yes


Respiratory Failure:  Severe Resp Distress











VELASQUEZ ZIMMER III DO           Aug 12, 2020 09:07

## 2020-08-12 NOTE — PDOC
PULMONARY PROGRESS NOTES


DATE: 8/12/20 


TIME: 10:43


Subjective


on vapotherm, sob better, has occ cough, no pain


continues to clinically improve


Vitals





Vital Signs








  Date Time  Temp Pulse Resp B/P (MAP) Pulse Ox O2 Delivery O2 Flow Rate FiO2


 


8/12/20 10:00  74 27 112/54 (73) 97 High Flow Nasal Cannula 40.0 


 


8/12/20 08:00 98.2       





 98.2       








Comments


on vapotherm


appears comfortable


no paradoxical abd  motion


no audible wheezing


alert


RRR, 


no edema 


No rash


ROS:  No Nausea, No Chest Pain, No Abdominal Pain, No Increase Cough


Labs





Laboratory Tests








Test


 8/11/20


08:00 8/12/20


08:15


 


O2 Saturation 92 % (92-99)  


 


Arterial Blood pH


 7.47


(7.35-7.45) 





 


Arterial Blood pCO2 at


Patient Temp 38 mmHg


(35-46) 





 


Arterial Blood pO2 at Patient


Temp 62 mmHg


() 





 


Arterial Blood HCO3


 27 mmol/L


(21-28) 





 


Arterial Blood Base Excess


 3 mmol/L


(-3-3) 





 


FiO2 60/bipap  


 


White Blood Count


 


 10.6 x10^3/uL


(4.0-11.0)


 


Red Blood Count


 


 4.37 x10^6/uL


(3.50-5.40)


 


Hemoglobin


 


 13.3 g/dL


(12.0-15.5)


 


Hematocrit


 


 38.8 %


(36.0-47.0)


 


Mean Corpuscular Volume  89 fL () 


 


Mean Corpuscular Hemoglobin  30 pg (25-35) 


 


Mean Corpuscular Hemoglobin


Concent 


 34 g/dL


(31-37)


 


Red Cell Distribution Width


 


 14.0 %


(11.5-14.5)


 


Platelet Count


 


 311 x10^3/uL


(140-400)


 


Neutrophils (%) (Auto)  88 % (31-73) 


 


Lymphocytes (%) (Auto)  6 % (24-48) 


 


Monocytes (%) (Auto)  6 % (0-9) 


 


Eosinophils (%) (Auto)  0 % (0-3) 


 


Basophils (%) (Auto)  0 % (0-3) 


 


Neutrophils # (Auto)


 


 9.3 x10^3/uL


(1.8-7.7)


 


Lymphocytes # (Auto)


 


 0.6 x10^3/uL


(1.0-4.8)


 


Monocytes # (Auto)


 


 0.7 x10^3/uL


(0.0-1.1)


 


Eosinophils # (Auto)


 


 0.0 x10^3/uL


(0.0-0.7)


 


Basophils # (Auto)


 


 0.0 x10^3/uL


(0.0-0.2)


 


Sodium Level


 


 141 mmol/L


(136-145)


 


Potassium Level


 


 4.2 mmol/L


(3.5-5.1)


 


Chloride Level


 


 107 mmol/L


()


 


Carbon Dioxide Level


 


 30 mmol/L


(21-32)


 


Anion Gap  4 (6-14) 


 


Blood Urea Nitrogen


 


 21 mg/dL


(7-20)


 


Creatinine


 


 0.7 mg/dL


(0.6-1.0)


 


Estimated GFR


(Cockcroft-Gault) 


 83.0 





 


Glucose Level


 


 87 mg/dL


(70-99)


 


Calcium Level


 


 7.6 mg/dL


(8.5-10.1)








Laboratory Tests








Test


 8/12/20


08:15


 


White Blood Count


 10.6 x10^3/uL


(4.0-11.0)


 


Red Blood Count


 4.37 x10^6/uL


(3.50-5.40)


 


Hemoglobin


 13.3 g/dL


(12.0-15.5)


 


Hematocrit


 38.8 %


(36.0-47.0)


 


Mean Corpuscular Volume 89 fL () 


 


Mean Corpuscular Hemoglobin 30 pg (25-35) 


 


Mean Corpuscular Hemoglobin


Concent 34 g/dL


(31-37)


 


Red Cell Distribution Width


 14.0 %


(11.5-14.5)


 


Platelet Count


 311 x10^3/uL


(140-400)


 


Neutrophils (%) (Auto) 88 % (31-73) 


 


Lymphocytes (%) (Auto) 6 % (24-48) 


 


Monocytes (%) (Auto) 6 % (0-9) 


 


Eosinophils (%) (Auto) 0 % (0-3) 


 


Basophils (%) (Auto) 0 % (0-3) 


 


Neutrophils # (Auto)


 9.3 x10^3/uL


(1.8-7.7)


 


Lymphocytes # (Auto)


 0.6 x10^3/uL


(1.0-4.8)


 


Monocytes # (Auto)


 0.7 x10^3/uL


(0.0-1.1)


 


Eosinophils # (Auto)


 0.0 x10^3/uL


(0.0-0.7)


 


Basophils # (Auto)


 0.0 x10^3/uL


(0.0-0.2)


 


Sodium Level


 141 mmol/L


(136-145)


 


Potassium Level


 4.2 mmol/L


(3.5-5.1)


 


Chloride Level


 107 mmol/L


()


 


Carbon Dioxide Level


 30 mmol/L


(21-32)


 


Anion Gap 4 (6-14) 


 


Blood Urea Nitrogen


 21 mg/dL


(7-20)


 


Creatinine


 0.7 mg/dL


(0.6-1.0)


 


Estimated GFR


(Cockcroft-Gault) 83.0 





 


Glucose Level


 87 mg/dL


(70-99)


 


Calcium Level


 7.6 mg/dL


(8.5-10.1)








Comments


CXR, reviewed 


 


Bilateral pneumonia, more confluent in the right upper lobe.





Impression


.


IMPRESSION:


1.  Acute hypoxic respiratory failure secondary to COVID-19 pneumonia and acute 

lung injury--- improving 


2.  No significant tobacco history.


3.  Abnormal chest x-ray consistent with COVID-19 pneumonia.


4.  Mildly increased liver function tests.


5. hypoxemia-- improving 


6. fever ,resolved





Plan


.


RECOMMENDATIONS:


Continue present vapotherm 40 liters and 65% setting reviewed, titrate FiO2 to 

keep sat 92%. BIPAP qhs


Monitor the course of treatment.  


solumedrol to 40 bid 


Monitor for inflammatory markers,


High dose DVT prophylaxis.


S/P convalescent plasma on 8/5/2020 and remdesivir.


PT/OT -- OOB as tolerated 





Discussed with RN and RT.











MAURI GALLAGHER MD                 Aug 12, 2020 10:45

## 2020-08-13 VITALS — SYSTOLIC BLOOD PRESSURE: 126 MMHG | DIASTOLIC BLOOD PRESSURE: 64 MMHG

## 2020-08-13 VITALS — DIASTOLIC BLOOD PRESSURE: 60 MMHG | SYSTOLIC BLOOD PRESSURE: 118 MMHG

## 2020-08-13 VITALS — DIASTOLIC BLOOD PRESSURE: 61 MMHG | SYSTOLIC BLOOD PRESSURE: 106 MMHG

## 2020-08-13 VITALS — SYSTOLIC BLOOD PRESSURE: 112 MMHG | DIASTOLIC BLOOD PRESSURE: 59 MMHG

## 2020-08-13 VITALS — SYSTOLIC BLOOD PRESSURE: 103 MMHG | DIASTOLIC BLOOD PRESSURE: 57 MMHG

## 2020-08-13 VITALS — DIASTOLIC BLOOD PRESSURE: 60 MMHG | SYSTOLIC BLOOD PRESSURE: 126 MMHG

## 2020-08-13 VITALS — SYSTOLIC BLOOD PRESSURE: 116 MMHG | DIASTOLIC BLOOD PRESSURE: 61 MMHG

## 2020-08-13 VITALS — SYSTOLIC BLOOD PRESSURE: 103 MMHG | DIASTOLIC BLOOD PRESSURE: 59 MMHG

## 2020-08-13 VITALS — SYSTOLIC BLOOD PRESSURE: 107 MMHG | DIASTOLIC BLOOD PRESSURE: 56 MMHG

## 2020-08-13 VITALS — DIASTOLIC BLOOD PRESSURE: 60 MMHG | SYSTOLIC BLOOD PRESSURE: 105 MMHG

## 2020-08-13 VITALS — DIASTOLIC BLOOD PRESSURE: 49 MMHG | SYSTOLIC BLOOD PRESSURE: 108 MMHG

## 2020-08-13 VITALS — DIASTOLIC BLOOD PRESSURE: 59 MMHG | SYSTOLIC BLOOD PRESSURE: 106 MMHG

## 2020-08-13 VITALS — SYSTOLIC BLOOD PRESSURE: 118 MMHG | DIASTOLIC BLOOD PRESSURE: 58 MMHG

## 2020-08-13 VITALS — SYSTOLIC BLOOD PRESSURE: 106 MMHG | DIASTOLIC BLOOD PRESSURE: 51 MMHG

## 2020-08-13 VITALS — DIASTOLIC BLOOD PRESSURE: 75 MMHG | SYSTOLIC BLOOD PRESSURE: 117 MMHG

## 2020-08-13 VITALS — DIASTOLIC BLOOD PRESSURE: 69 MMHG | SYSTOLIC BLOOD PRESSURE: 118 MMHG

## 2020-08-13 VITALS — DIASTOLIC BLOOD PRESSURE: 58 MMHG | SYSTOLIC BLOOD PRESSURE: 116 MMHG

## 2020-08-13 VITALS — DIASTOLIC BLOOD PRESSURE: 61 MMHG | SYSTOLIC BLOOD PRESSURE: 112 MMHG

## 2020-08-13 VITALS — SYSTOLIC BLOOD PRESSURE: 117 MMHG | DIASTOLIC BLOOD PRESSURE: 60 MMHG

## 2020-08-13 VITALS — SYSTOLIC BLOOD PRESSURE: 123 MMHG | DIASTOLIC BLOOD PRESSURE: 60 MMHG

## 2020-08-13 VITALS — SYSTOLIC BLOOD PRESSURE: 106 MMHG | DIASTOLIC BLOOD PRESSURE: 56 MMHG

## 2020-08-13 VITALS — DIASTOLIC BLOOD PRESSURE: 56 MMHG | SYSTOLIC BLOOD PRESSURE: 106 MMHG

## 2020-08-13 RX ADMIN — METHYLPREDNISOLONE SODIUM SUCCINATE SCH MG: 40 INJECTION, POWDER, FOR SOLUTION INTRAMUSCULAR; INTRAVENOUS at 08:26

## 2020-08-13 RX ADMIN — ZINC SULFATE CAP 220 MG (50 MG ELEMENTAL ZN) SCH MG: 220 (50 ZN) CAP at 09:58

## 2020-08-13 RX ADMIN — Medication SCH CAP: at 08:26

## 2020-08-13 RX ADMIN — BENZONATATE SCH MG: 100 CAPSULE, LIQUID FILLED ORAL at 06:24

## 2020-08-13 RX ADMIN — METHYLPREDNISOLONE SODIUM SUCCINATE SCH MG: 40 INJECTION, POWDER, FOR SOLUTION INTRAMUSCULAR; INTRAVENOUS at 21:37

## 2020-08-13 RX ADMIN — MULTIPLE VITAMINS W/ MINERALS TAB SCH TAB: TAB at 08:26

## 2020-08-13 RX ADMIN — ENOXAPARIN SODIUM SCH MG: 40 INJECTION SUBCUTANEOUS at 08:26

## 2020-08-13 RX ADMIN — BENZONATATE SCH MG: 100 CAPSULE, LIQUID FILLED ORAL at 13:39

## 2020-08-13 RX ADMIN — Medication SCH CAP: at 21:37

## 2020-08-13 RX ADMIN — ENOXAPARIN SODIUM SCH MG: 40 INJECTION SUBCUTANEOUS at 21:38

## 2020-08-13 RX ADMIN — BENZONATATE SCH MG: 100 CAPSULE, LIQUID FILLED ORAL at 21:37

## 2020-08-13 RX ADMIN — GUAIFENESIN AND DEXTROMETHORPHAN PRN ML: 100; 10 SYRUP ORAL at 08:25

## 2020-08-13 RX ADMIN — WATER PRN ML: 1 INJECTION INTRAVENOUS at 10:09

## 2020-08-13 NOTE — NUR
SS following up with discharge planning. SS reviewed pt chart and discussed with pt RN. Pt 
currently on Vapotherm. COVID19 positive. SS will continue to follow for discharge planning.

## 2020-08-13 NOTE — PDOC
Infectious Disease Note


Subjective:


Subjective


Pt feels better


on vapotherm


no other complaints





Vital Signs:


Vital Signs





Vital Signs








  Date Time  Temp Pulse Resp B/P (MAP) Pulse Ox O2 Delivery O2 Flow Rate FiO2


 


8/13/20 07:00 98.2 56 20 126/64 (84) 92 VAPOTHERM 40.0 





 98.2       











Physical Exam:


PHYSICAL EXAM


GENERAL:  Propped up in bed, alert, calm, on vapotherm


HEENT:  PERRL. Oral cavity dry 


NECK:  Supple


LUNGS:  Diminished aeration, no accessory muscle use 


HEART:  S1, S2 regular.


ABDOMEN:  Soft and nontender 


EXTREMITIES:  No edema or cyanosis. SCDs bilaterally 


SKIN:  No signs of rash.


NEUROLOGIC:  Alert and oriented, 


PIV





Medications:


Inpatient Meds:





Current Medications








 Medications


  (Trade)  Dose


 Ordered  Sig/Gaby  Start Time


 Stop Time Status Last Admin


Dose Admin


 


 Acetaminophen


  (Tylenol)  650 mg  PRN Q4HRS  PRN  8/5/20 10:30


    8/9/20 07:59


650 MG


 


 Benzonatate


  (Tessalon Perle)  200 mg  Q8HRS  8/10/20 12:00


    8/13/20 06:24


200 MG


 


 Enoxaparin Sodium


  (Lovenox 40mg


 Syringe)  40 mg  Q12HR  8/5/20 10:30


    8/12/20 21:11


40 MG


 


 Guaifenesin


  (Robitussin Dm)  10 ml  PRN Q6HRS  PRN  8/5/20 10:45


    8/12/20 15:56


10 ML


 


 Lactobacillus


 Rhamnosus


  (Culturelle)  1 cap  BID  8/6/20 21:00


    8/12/20 21:11


1 CAP


 


 Linezolid


  (Zyvox)  600 mg  BID  8/9/20 10:00


 8/12/20 09:22 DC 8/12/20 08:19


600 MG


 


 Methylprednisolone


 Sodium Succinate


  (SOLU-Medrol


 40MG VIAL)  40 mg  Q12HR  8/8/20 21:00


    8/12/20 21:12


40 MG


 


 Multivitamins


  (Thera M Plus)  1 tab  DAILY  8/10/20 15:00


    8/12/20 08:19


1 TAB


 


 Non-Formulary


 Medication 1 ea/


 Sodium Chloride  230 ml @ 


 460 mls/hr  Q24H  8/5/20 13:00


 8/8/20 13:29 DC 8/8/20 14:30


460 MLS/HR


 


 Ondansetron HCl


  (Zofran)  4 mg  PRN Q4HRS  PRN  8/5/20 10:30


     





 


 Piperacillin Sod/


 Tazobactam Sod


  (Zosyn Per


 Pharmacy)  1 each  PRN DAILY  PRN  8/5/20 11:00


 8/12/20 09:23 DC  





 


 Piperacillin Sod/


 Tazobactam Sod


 3.375 gm/Sodium


 Chloride  50 ml @ 


 100 mls/hr  Q6HRS  8/5/20 12:00


 8/12/20 09:22 DC 8/12/20 06:04


100 MLS/HR


 


 Potassium


 Chloride/Water  100 ml @ 


 100 mls/hr  Q1H  8/9/20 10:00


 8/9/20 11:59 DC 8/9/20 10:54


100 MLS/HR


 


 Sterile Water


  (WATER for RESP)  1,000 ml  CONT  PRN  8/5/20 16:15


    8/12/20 21:12


1,000 ML


 


 Zinc Sulfate


  (Orazinc)  220 mg  DAILY  8/5/20 11:00


    8/12/20 08:20


220 MG


 


 Zolpidem Tartrate


  (Ambien)  5 mg  PRN QHS  PRN  8/5/20 10:30


     














Labs:


Lab





Laboratory Tests








Test


 8/12/20


08:15


 


White Blood Count


 10.6 x10^3/uL


(4.0-11.0)


 


Red Blood Count


 4.37 x10^6/uL


(3.50-5.40)


 


Hemoglobin


 13.3 g/dL


(12.0-15.5)


 


Hematocrit


 38.8 %


(36.0-47.0)


 


Mean Corpuscular Volume 89 fL () 


 


Mean Corpuscular Hemoglobin 30 pg (25-35) 


 


Mean Corpuscular Hemoglobin


Concent 34 g/dL


(31-37)


 


Red Cell Distribution Width


 14.0 %


(11.5-14.5)


 


Platelet Count


 311 x10^3/uL


(140-400)


 


Neutrophils (%) (Auto) 88 % (31-73) 


 


Lymphocytes (%) (Auto) 6 % (24-48) 


 


Monocytes (%) (Auto) 6 % (0-9) 


 


Eosinophils (%) (Auto) 0 % (0-3) 


 


Basophils (%) (Auto) 0 % (0-3) 


 


Neutrophils # (Auto)


 9.3 x10^3/uL


(1.8-7.7)


 


Lymphocytes # (Auto)


 0.6 x10^3/uL


(1.0-4.8)


 


Monocytes # (Auto)


 0.7 x10^3/uL


(0.0-1.1)


 


Eosinophils # (Auto)


 0.0 x10^3/uL


(0.0-0.7)


 


Basophils # (Auto)


 0.0 x10^3/uL


(0.0-0.2)


 


Sodium Level


 141 mmol/L


(136-145)


 


Potassium Level


 4.2 mmol/L


(3.5-5.1)


 


Chloride Level


 107 mmol/L


()


 


Carbon Dioxide Level


 30 mmol/L


(21-32)


 


Anion Gap 4 (6-14) 


 


Blood Urea Nitrogen


 21 mg/dL


(7-20)


 


Creatinine


 0.7 mg/dL


(0.6-1.0)


 


Estimated GFR


(Cockcroft-Gault) 83.0 





 


Glucose Level


 87 mg/dL


(70-99)


 


Calcium Level


 7.6 mg/dL


(8.5-10.1)











Objective:


Assessment:


Fever resolved


Leukocytosis,  on steroids 


COVID-19 positive. s/p convalescent plasma and Remdesivir. 


Bilateral pulmonary infiltrate.


Hypoxemia. On BiPAP 


Obesity.


UC yeast





Plan:


Plan of Care


cont supportive care


monitor off antibiotics


Steroids


Convalescent plasma and Remdesivir given


Probiotics 


Maintain aspiration precautions


 


 


d/w BRET SOSA MD           Aug 13, 2020 07:53

## 2020-08-13 NOTE — PDOC
PULMONARY PROGRESS NOTES


DATE: 8/13/20 


TIME: 09:26


Subjective


on vapotherm, sob better, has occ cough, no pain


continues to clinically improve


Vitals





Vital Signs








  Date Time  Temp Pulse Resp B/P (MAP) Pulse Ox O2 Delivery O2 Flow Rate FiO2


 


8/13/20 09:17 97.8 75 24 117/60 (79) 92 Nasal Cannula 40.0 





 97.8       








Comments


on vapotherm


appears comfortable


no paradoxical abd  motion


no audible wheezing


alert


RRR, 


no edema 


No rash


ROS:  No Nausea, No Chest Pain, No Abdominal Pain, No Increase Cough


Labs





Laboratory Tests








Test


 8/12/20


08:15


 


White Blood Count


 10.6 x10^3/uL


(4.0-11.0)


 


Red Blood Count


 4.37 x10^6/uL


(3.50-5.40)


 


Hemoglobin


 13.3 g/dL


(12.0-15.5)


 


Hematocrit


 38.8 %


(36.0-47.0)


 


Mean Corpuscular Volume 89 fL () 


 


Mean Corpuscular Hemoglobin 30 pg (25-35) 


 


Mean Corpuscular Hemoglobin


Concent 34 g/dL


(31-37)


 


Red Cell Distribution Width


 14.0 %


(11.5-14.5)


 


Platelet Count


 311 x10^3/uL


(140-400)


 


Neutrophils (%) (Auto) 88 % (31-73) 


 


Lymphocytes (%) (Auto) 6 % (24-48) 


 


Monocytes (%) (Auto) 6 % (0-9) 


 


Eosinophils (%) (Auto) 0 % (0-3) 


 


Basophils (%) (Auto) 0 % (0-3) 


 


Neutrophils # (Auto)


 9.3 x10^3/uL


(1.8-7.7)


 


Lymphocytes # (Auto)


 0.6 x10^3/uL


(1.0-4.8)


 


Monocytes # (Auto)


 0.7 x10^3/uL


(0.0-1.1)


 


Eosinophils # (Auto)


 0.0 x10^3/uL


(0.0-0.7)


 


Basophils # (Auto)


 0.0 x10^3/uL


(0.0-0.2)


 


Sodium Level


 141 mmol/L


(136-145)


 


Potassium Level


 4.2 mmol/L


(3.5-5.1)


 


Chloride Level


 107 mmol/L


()


 


Carbon Dioxide Level


 30 mmol/L


(21-32)


 


Anion Gap 4 (6-14) 


 


Blood Urea Nitrogen


 21 mg/dL


(7-20)


 


Creatinine


 0.7 mg/dL


(0.6-1.0)


 


Estimated GFR


(Cockcroft-Gault) 83.0 





 


Glucose Level


 87 mg/dL


(70-99)


 


Calcium Level


 7.6 mg/dL


(8.5-10.1)








Comments


CXR, reviewed 


 


Bilateral pneumonia, more confluent in the right upper lobe.





Impression


.


IMPRESSION:


1.  Acute hypoxic respiratory failure secondary to COVID-19 pneumonia and acute 

lung injury--- improving 


2.  No significant tobacco history.


3.  Abnormal chest x-ray consistent with COVID-19 pneumonia.


4.  Mildly increased liver function tests.


5. hypoxemia-- improving 


6. fever ,resolved





Plan


.


RECOMMENDATIONS:


Continue present vapotherm 40 liters and 60% setting reviewed, titrate FiO2 to 

keep sat 92%.


Once down to 50%FIO2, will wean vapothern


Monitor the course of treatment.  


solumedrol to 40 bid 


Monitor for inflammatory markers,


repeat D-dIMER


High dose DVT prophylaxis.


S/P convalescent plasma on 8/5/2020 and remdesivir.


PT/OT -- OOB as tolerated 





Discussed with RN and RT.











MAURI GALLAGHER MD                 Aug 13, 2020 09:27

## 2020-08-13 NOTE — NUR
PT RESTING IN BED TURN SELF FREQUENTLY AND TAKING IN GOOD PO. REMAINS ON VAPOTHERM. PT 
PROGRESSING TOWARDS GOALS.

## 2020-08-14 VITALS — SYSTOLIC BLOOD PRESSURE: 115 MMHG | DIASTOLIC BLOOD PRESSURE: 58 MMHG

## 2020-08-14 VITALS — SYSTOLIC BLOOD PRESSURE: 93 MMHG | DIASTOLIC BLOOD PRESSURE: 50 MMHG

## 2020-08-14 VITALS — SYSTOLIC BLOOD PRESSURE: 98 MMHG | DIASTOLIC BLOOD PRESSURE: 55 MMHG

## 2020-08-14 VITALS — DIASTOLIC BLOOD PRESSURE: 74 MMHG | SYSTOLIC BLOOD PRESSURE: 113 MMHG

## 2020-08-14 VITALS — SYSTOLIC BLOOD PRESSURE: 122 MMHG | DIASTOLIC BLOOD PRESSURE: 63 MMHG

## 2020-08-14 VITALS — SYSTOLIC BLOOD PRESSURE: 106 MMHG | DIASTOLIC BLOOD PRESSURE: 50 MMHG

## 2020-08-14 VITALS — SYSTOLIC BLOOD PRESSURE: 92 MMHG | DIASTOLIC BLOOD PRESSURE: 54 MMHG

## 2020-08-14 VITALS — SYSTOLIC BLOOD PRESSURE: 108 MMHG | DIASTOLIC BLOOD PRESSURE: 69 MMHG

## 2020-08-14 VITALS — DIASTOLIC BLOOD PRESSURE: 52 MMHG | SYSTOLIC BLOOD PRESSURE: 111 MMHG

## 2020-08-14 VITALS — DIASTOLIC BLOOD PRESSURE: 62 MMHG | SYSTOLIC BLOOD PRESSURE: 107 MMHG

## 2020-08-14 VITALS — SYSTOLIC BLOOD PRESSURE: 98 MMHG | DIASTOLIC BLOOD PRESSURE: 59 MMHG

## 2020-08-14 VITALS — SYSTOLIC BLOOD PRESSURE: 104 MMHG | DIASTOLIC BLOOD PRESSURE: 51 MMHG

## 2020-08-14 VITALS — SYSTOLIC BLOOD PRESSURE: 102 MMHG | DIASTOLIC BLOOD PRESSURE: 56 MMHG

## 2020-08-14 VITALS — SYSTOLIC BLOOD PRESSURE: 119 MMHG | DIASTOLIC BLOOD PRESSURE: 60 MMHG

## 2020-08-14 VITALS — SYSTOLIC BLOOD PRESSURE: 102 MMHG | DIASTOLIC BLOOD PRESSURE: 52 MMHG

## 2020-08-14 VITALS — SYSTOLIC BLOOD PRESSURE: 116 MMHG | DIASTOLIC BLOOD PRESSURE: 53 MMHG

## 2020-08-14 VITALS — SYSTOLIC BLOOD PRESSURE: 102 MMHG | DIASTOLIC BLOOD PRESSURE: 65 MMHG

## 2020-08-14 VITALS — SYSTOLIC BLOOD PRESSURE: 100 MMHG | DIASTOLIC BLOOD PRESSURE: 54 MMHG

## 2020-08-14 VITALS — SYSTOLIC BLOOD PRESSURE: 116 MMHG | DIASTOLIC BLOOD PRESSURE: 56 MMHG

## 2020-08-14 VITALS — DIASTOLIC BLOOD PRESSURE: 62 MMHG | SYSTOLIC BLOOD PRESSURE: 97 MMHG

## 2020-08-14 VITALS — SYSTOLIC BLOOD PRESSURE: 116 MMHG | DIASTOLIC BLOOD PRESSURE: 59 MMHG

## 2020-08-14 VITALS — SYSTOLIC BLOOD PRESSURE: 108 MMHG | DIASTOLIC BLOOD PRESSURE: 74 MMHG

## 2020-08-14 LAB
ANION GAP SERPL CALC-SCNC: 8 MMOL/L (ref 6–14)
BASE EXCESS ABG: -1 MMOL/L (ref -3–3)
BASOPHILS # BLD AUTO: 0 X10^3/UL (ref 0–0.2)
BASOPHILS NFR BLD: 0 % (ref 0–3)
BUN SERPL-MCNC: 21 MG/DL (ref 7–20)
CALCIUM SERPL-MCNC: 7.5 MG/DL (ref 8.5–10.1)
CHLORIDE SERPL-SCNC: 105 MMOL/L (ref 98–107)
CO2 SERPL-SCNC: 29 MMOL/L (ref 21–32)
CREAT SERPL-MCNC: 0.7 MG/DL (ref 0.6–1)
EOSINOPHIL NFR BLD: 0 % (ref 0–3)
EOSINOPHIL NFR BLD: 0 X10^3/UL (ref 0–0.7)
ERYTHROCYTE [DISTWIDTH] IN BLOOD BY AUTOMATED COUNT: 13.5 % (ref 11.5–14.5)
GFR SERPLBLD BASED ON 1.73 SQ M-ARVRAT: 83 ML/MIN
GLUCOSE SERPL-MCNC: 106 MG/DL (ref 70–99)
HCO3 BLDA-SCNC: 22 MMOL/L (ref 21–28)
HCT VFR BLD CALC: 38.4 % (ref 36–47)
HGB BLD-MCNC: 13 G/DL (ref 12–15.5)
INSPIRATION SETTING TIME VENT: (no result)
LYMPHOCYTES # BLD: 0.8 X10^3/UL (ref 1–4.8)
LYMPHOCYTES NFR BLD AUTO: 7 % (ref 24–48)
MCH RBC QN AUTO: 30 PG (ref 25–35)
MCHC RBC AUTO-ENTMCNC: 34 G/DL (ref 31–37)
MCV RBC AUTO: 89 FL (ref 79–100)
MONO #: 0.4 X10^3/UL (ref 0–1.1)
MONOCYTES NFR BLD: 4 % (ref 0–9)
NEUT #: 9.6 X10^3/UL (ref 1.8–7.7)
NEUTROPHILS NFR BLD AUTO: 89 % (ref 31–73)
PCO2 BLDA: 31 MMHG (ref 35–46)
PLATELET # BLD AUTO: 297 X10^3/UL (ref 140–400)
PO2 BLDA: 65 MMHG (ref 65–108)
POTASSIUM SERPL-SCNC: 4.5 MMOL/L (ref 3.5–5.1)
RBC # BLD AUTO: 4.32 X10^6/UL (ref 3.5–5.4)
SAO2 % BLDA: 92 % (ref 92–99)
SODIUM SERPL-SCNC: 142 MMOL/L (ref 136–145)
WBC # BLD AUTO: 10.8 X10^3/UL (ref 4–11)

## 2020-08-14 RX ADMIN — MULTIPLE VITAMINS W/ MINERALS TAB SCH TAB: TAB at 08:32

## 2020-08-14 RX ADMIN — Medication SCH CAP: at 21:40

## 2020-08-14 RX ADMIN — BENZONATATE SCH MG: 100 CAPSULE, LIQUID FILLED ORAL at 13:54

## 2020-08-14 RX ADMIN — WATER PRN ML: 1 INJECTION INTRAVENOUS at 00:08

## 2020-08-14 RX ADMIN — ENOXAPARIN SODIUM SCH MG: 40 INJECTION SUBCUTANEOUS at 08:32

## 2020-08-14 RX ADMIN — ZINC SULFATE CAP 220 MG (50 MG ELEMENTAL ZN) SCH MG: 220 (50 ZN) CAP at 08:32

## 2020-08-14 RX ADMIN — METHYLPREDNISOLONE SODIUM SUCCINATE SCH MG: 40 INJECTION, POWDER, FOR SOLUTION INTRAMUSCULAR; INTRAVENOUS at 09:45

## 2020-08-14 RX ADMIN — BENZONATATE SCH MG: 100 CAPSULE, LIQUID FILLED ORAL at 06:16

## 2020-08-14 RX ADMIN — ENOXAPARIN SODIUM SCH MG: 40 INJECTION SUBCUTANEOUS at 21:40

## 2020-08-14 RX ADMIN — BENZONATATE SCH MG: 100 CAPSULE, LIQUID FILLED ORAL at 21:40

## 2020-08-14 RX ADMIN — Medication SCH CAP: at 08:32

## 2020-08-14 RX ADMIN — WATER PRN ML: 1 INJECTION INTRAVENOUS at 23:53

## 2020-08-14 RX ADMIN — METHYLPREDNISOLONE SODIUM SUCCINATE SCH MG: 40 INJECTION, POWDER, FOR SOLUTION INTRAMUSCULAR; INTRAVENOUS at 08:32

## 2020-08-14 NOTE — PDOC
[facility] PROGRESS NOTE


Date of Service


DOS:


DATE: 8/14/20 


TIME: 10:28





Chief Complaint


Chief Complaint


A/P:


Acute hypoxic respiratory failuresecondary to SARSCo.2 pneumonia.  Continue 

supportive therapy, steroids room to severe protocol initiated on 8 4 will 

continue here.  Consult ID and pulmonology


SARS- COV2 pneumonia -continue IV Solu-Medrol wean O2 as tolerated I discussed 

with pulmonology to move to a negative pressure room if her desaturations worsen

and she may need BiPAP.. Will try experimental drug to severe and convalescent 

plasma therapies with ID and pulmonology.


Hypokalemiawill replace, check magnesium level


Diarrhealikely COVID-19 related.  Will check for C. difficile per protocol if 

more than 3 bowel movements in a day


Transaminitis also likely related COVID-19, will monitor


Severe protein calorie malnutritionlikely related to above





FEN - General diet


PPX - lovenox


FULL CODE


Dispo - ICU for above


CC time 38 minutes





History of Present Illness


History of Present Illness


08/14/20


Patient seen and examined in ICU


Chart reviewed


Discussed with RN


Patient improving





08/13/20


Patient seen and examined in ICU


Chart reviewed


Discussed with RN


Patient is improving





08/12/20


Patient seen and examined in ICU


Chart reviewed


Discussed with RN


Patient was smiling and looking much better


For the fist time last night she didn't require Bipap





08/11/20


Patient seen and examined in ICU


Chart reviewed


Discussed with RN


Patient is improving


No cough today


Patient on Vapotherm 





08/10/20


Patient seen and examined in ICU


Discussed with RN


Patient seems to be improving 


She has a bit of a cough


Patient has received treatment with plasma and remdesivir





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 


On 7/30/2020 she began having symptoms of diarrhea and shortness of breath and 

went to the ED to have COVID-19 testing, she was notified of the results on 

7/31/2020 and asked to quarantine at home for 14 days.  Over the course the next

5 days she became progressively more weak had fever and chills and began having 

myalgias and worsening diarrhea with little bit of abdominal pain.  She returned

to the ED on 8/4/2020 at [facility] and was found to be hypoxic.


ABG on 8/4/2020 7.472/30 5.1/94.3 with HCO3 of 26.2 on nonrebreather 10 L labs 

on 8 4 sodium 135, K3.2, chloride 99, CO2 25, BUN 17, creatinine 1, glucose 115,

lactate 1.9, calcium 8, ferritin 1491, bilirubin 0.4, , ALT 5 8, AL K 

phosphatase 62 troponin negative, .6, total protein 6.3, albumin 2.7 prea

lbumin 9, lipase 185, B12 995, procalcitonin 0.40, TSH 1.427, d-dimer 1.16, WBC 

7.1, Hb 14.1, platelets 302 chest x-ray on 8 4 read as worsening interstitial 

and airspace opacities in the right upper lobe and left lung base and worsening 

interstitial opacities in the right lung base compatible with multifocal 

pneumonia


EKG appears normal sinus rhythm with heart rate approximately 85 bpm with normal

axis some inferior T wave changes that are nonspecific no ST segment changes.





8/6: Seen in our ICU on Vapotherm.  She is still complaining of weakness and 

diarrhea to me, 2 BM this morning. Afebrile. Still with cough today. O2 53 on 

Vapotherm. Remdesivir and convalescent FFP given


8/7: O2 63 on ABG this morning after BIPAP overnight. She feels a bit improved, 

appetite improved. No diarrhea yet today. Still weak with myalgias.


8/8:Less short of breath, on BiPAP with FiO2 70%.  No chest pain or wheezing, no

fevers.  Still with some loose stools.  LFTs normal today.  WBC 10.2, Hb 12.7, 

platelets 4 2,  5K3.6 BUN 22, CR 0.6 ABG 7.48, 35, 69 BiPAP 70%.





Febrile 1 1.2 F.  Seen on BiPAP 14/6 with 50% FiO2.  She is very comfortable 

with this.  Has not had a bowel movement a day and a half.  Asking a 70 bedpan 

right now though.  Appetite is decreased.





Plan:


Already on Zosyn, have discussed with ID adding Zyvox would be appropriate.





Vitals/I&O


Vitals/I&O:





                                   Vital Signs








  Date Time  Temp Pulse Resp B/P (MAP) Pulse Ox O2 Delivery O2 Flow Rate FiO2


 


8/14/20 08:15     92 vapotherm 40.0 


 


8/14/20 07:00 97.8 62 18 102/65 (77)    





 97.8       














                                    I & O   


 


 8/13/20 8/13/20 8/14/20





 15:00 23:00 07:00


 


Intake Total  750 ml 200 ml


 


Output Total 201 ml  0 ml


 


Balance -201 ml 750 ml 200 ml











Physical Exam


Physical Exam:


GENERAL:  Propped up in bed, alert, calm, on vapotherm


HEENT:  PERRL. Oral cavity dry 


NECK:  Supple


LUNGS:  Diminished aeration, no accessory muscle use 


HEART:  S1, S2 regular.


ABDOMEN:  Soft and nontender 


EXTREMITIES:  No edema or cyanosis. SCDs bilaterally 


SKIN:  No signs of rash.


NEUROLOGIC:  Alert and oriented, 


PIV


General:  Alert, Oriented X3, Cooperative, moderate distress


Heart:  Regular rate, Other (Rhonchi bilaterally)


Abdomen:  Normal bowel sounds, Soft, No tenderness, No hepatosplenomegaly, No 

masses


Extremities:  No clubbing, No cyanosis, No edema, Normal pulses, No 

tenderness/swelling


Skin:  No rashes, No breakdown, No significant lesion





Labs


Labs:





Laboratory Tests








Test


 8/13/20


11:34 8/14/20


03:30 8/14/20


08:30


 


D-Dimer (Gabby)


 1.95 ug/mlFEU


(0.00-0.50) 


 





 


White Blood Count


 


 10.8 x10^3/uL


(4.0-11.0) 





 


Red Blood Count


 


 4.32 x10^6/uL


(3.50-5.40) 





 


Hemoglobin


 


 13.0 g/dL


(12.0-15.5) 





 


Hematocrit


 


 38.4 %


(36.0-47.0) 





 


Mean Corpuscular Volume  89 fL ()  


 


Mean Corpuscular Hemoglobin  30 pg (25-35)  


 


Mean Corpuscular Hemoglobin


Concent 


 34 g/dL


(31-37) 





 


Red Cell Distribution Width


 


 13.5 %


(11.5-14.5) 





 


Platelet Count


 


 297 x10^3/uL


(140-400) 





 


Neutrophils (%) (Auto)  89 % (31-73)  


 


Lymphocytes (%) (Auto)  7 % (24-48)  


 


Monocytes (%) (Auto)  4 % (0-9)  


 


Eosinophils (%) (Auto)  0 % (0-3)  


 


Basophils (%) (Auto)  0 % (0-3)  


 


Neutrophils # (Auto)


 


 9.6 x10^3/uL


(1.8-7.7) 





 


Lymphocytes # (Auto)


 


 0.8 x10^3/uL


(1.0-4.8) 





 


Monocytes # (Auto)


 


 0.4 x10^3/uL


(0.0-1.1) 





 


Eosinophils # (Auto)


 


 0.0 x10^3/uL


(0.0-0.7) 





 


Basophils # (Auto)


 


 0.0 x10^3/uL


(0.0-0.2) 





 


Sodium Level


 


 142 mmol/L


(136-145) 





 


Potassium Level


 


 4.5 mmol/L


(3.5-5.1) 





 


Chloride Level


 


 105 mmol/L


() 





 


Carbon Dioxide Level


 


 29 mmol/L


(21-32) 





 


Anion Gap  8 (6-14)  


 


Blood Urea Nitrogen


 


 21 mg/dL


(7-20) 





 


Creatinine


 


 0.7 mg/dL


(0.6-1.0) 





 


Estimated GFR


(Cockcroft-Gault) 


 83.0 


 





 


Glucose Level


 


 106 mg/dL


(70-99) 





 


Calcium Level


 


 7.5 mg/dL


(8.5-10.1) 





 


O2 Saturation   92 % (92-99) 


 


Arterial Blood pH


 


 


 7.46


(7.35-7.45)


 


Arterial Blood pCO2 at


Patient Temp 


 


 31 mmHg


(35-46)


 


Arterial Blood pO2 at Patient


Temp 


 


 65 mmHg


()


 


Arterial Blood HCO3


 


 


 22 mmol/L


(21-28)


 


Arterial Blood Base Excess


 


 


 -1 mmol/L


(-3-3)


 


FiO2   60%40l 











Assessment and Plan


Assessmemt and Plan


Assessment:


Acute hypoxic respiratory failuresecondary to SARSCo.2 pneumonia.  Continue 

supportive therapy, steroids room to severe protocol initiated on 8 4 will 

continue here.  Consult ID and pulmonology


SARS- COV2 pneumonia -continue IV Solu-Medrol wean O2 as tolerated I discussed 

with pulmonology to move to a negative pressure room if her desaturations worsen

and she may need BiPAP.. Will try experimental drug to severe and convalescent 

plasma therapies with ID and pulmonology.


Hypokalemiawill replace, check magnesium level


Diarrhealikely COVID-19 related.  Will check for C. difficile per protocol if 

more than 3 bowel movements in a day


Transaminitis also likely related COVID-19, will monitor


Severe protein calorie malnutritionlikely related to above





Plan:


ICU monitoring


Respiratory isolation


Continue IV steroids


Vapotherm


DVT prophylaxis


Appreciate subspecialist input





Comment


Review of Relevant


I have reviewed the following items anum (where applicable) has been applied.





Justicifation of Admission Dx:


Justifications for Admission:


Justification of Admission Dx:  Yes


Respiratory Failure:  Severe Resp Distress











VELASQUEZ ZIMMER III DO           Aug 14, 2020 10:33

## 2020-08-14 NOTE — NUR
SS following up with discharge planning. SS reviewed pt chart and discussed with pt RN. Pt 
COVID19 positive. Pt on high flow nasal canula. SS will continue to follow for discharge 
planning.

## 2020-08-14 NOTE — PDOC
Infectious Disease Note


Subjective:


Subjective


Pt feels better


Remains on vapotherm


Appetite has improved





Vital Signs:


Vital Signs





Vital Signs








  Date Time  Temp Pulse Resp B/P (MAP) Pulse Ox O2 Delivery O2 Flow Rate FiO2


 


8/14/20 07:00 97.8 62 18 102/65 (77) 92 vapotherm 40.0 





 97.8       











Physical Exam:


PHYSICAL EXAM


GENERAL:  Propped up in bed, alert, calm, on vapotherm


HEENT:  PERRL. Oral cavity dry 


NECK:  Supple


LUNGS:  Diminished aeration, no accessory muscle use 


HEART:  S1, S2 regular.


ABDOMEN:  Soft and nontender 


EXTREMITIES:  No edema or cyanosis. SCDs bilaterally 


SKIN:  No signs of rash.


NEUROLOGIC:  Alert and oriented, 


PIV





Medications:


Inpatient Meds:





Current Medications








 Medications


  (Trade)  Dose


 Ordered  Sig/Gaby  Start Time


 Stop Time Status Last Admin


Dose Admin


 


 Acetaminophen


  (Tylenol)  650 mg  PRN Q4HRS  PRN  8/5/20 10:30


    8/9/20 07:59





 


 Benzonatate


  (Tessalon Perle)  200 mg  Q8HRS  8/10/20 12:00


    8/14/20 06:16





 


 Enoxaparin Sodium


  (Lovenox 40mg


 Syringe)  40 mg  Q12HR  8/5/20 10:30


    8/13/20 21:38





 


 Guaifenesin


  (Robitussin Dm)  10 ml  PRN Q6HRS  PRN  8/5/20 10:45


    8/13/20 08:25





 


 Lactobacillus


 Rhamnosus


  (Culturelle)  1 cap  BID  8/6/20 21:00


    8/13/20 21:37





 


 Linezolid


  (Zyvox)  600 mg  BID  8/9/20 10:00


 8/12/20 09:22 DC 8/12/20 08:19





 


 Methylprednisolone


 Sodium Succinate


  (SOLU-Medrol


 40MG VIAL)  40 mg  Q12HR  8/8/20 21:00


    8/13/20 21:37





 


 Multivitamins


  (Thera M Plus)  1 tab  DAILY  8/10/20 15:00


    8/13/20 08:26





 


 Non-Formulary


 Medication 1 ea/


 Sodium Chloride  230 ml @ 


 460 mls/hr  Q24H  8/5/20 13:00


 8/8/20 13:29 DC 8/8/20 14:30





 


 Ondansetron HCl


  (Zofran)  4 mg  PRN Q4HRS  PRN  8/5/20 10:30


     





 


 Piperacillin Sod/


 Tazobactam Sod


  (Zosyn Per


 Pharmacy)  1 each  PRN DAILY  PRN  8/5/20 11:00


 8/12/20 09:23 DC  





 


 Piperacillin Sod/


 Tazobactam Sod


 3.375 gm/Sodium


 Chloride  50 ml @ 


 100 mls/hr  Q6HRS  8/5/20 12:00


 8/12/20 09:22 DC 8/12/20 06:04





 


 Potassium


 Chloride/Water  100 ml @ 


 100 mls/hr  Q1H  8/9/20 10:00


 8/9/20 11:59 DC 8/9/20 10:54





 


 Sterile Water


  (WATER for RESP)  1,000 ml  CONT  PRN  8/5/20 16:15


    8/14/20 00:08





 


 Zinc Sulfate


  (Orazinc)  220 mg  DAILY  8/5/20 11:00


    8/13/20 09:58





 


 Zolpidem Tartrate


  (Ambien)  5 mg  PRN QHS  PRN  8/5/20 10:30


     














Labs:


Lab





Laboratory Tests








Test


 8/13/20


11:34 8/14/20


03:30


 


D-Dimer (Gabby)


 1.95 ug/mlFEU


(0.00-0.50) 





 


White Blood Count


 


 10.8 x10^3/uL


(4.0-11.0)


 


Red Blood Count


 


 4.32 x10^6/uL


(3.50-5.40)


 


Hemoglobin


 


 13.0 g/dL


(12.0-15.5)


 


Hematocrit


 


 38.4 %


(36.0-47.0)


 


Mean Corpuscular Volume  89 fL () 


 


Mean Corpuscular Hemoglobin  30 pg (25-35) 


 


Mean Corpuscular Hemoglobin


Concent 


 34 g/dL


(31-37)


 


Red Cell Distribution Width


 


 13.5 %


(11.5-14.5)


 


Platelet Count


 


 297 x10^3/uL


(140-400)


 


Neutrophils (%) (Auto)  89 % (31-73) 


 


Lymphocytes (%) (Auto)  7 % (24-48) 


 


Monocytes (%) (Auto)  4 % (0-9) 


 


Eosinophils (%) (Auto)  0 % (0-3) 


 


Basophils (%) (Auto)  0 % (0-3) 


 


Neutrophils # (Auto)


 


 9.6 x10^3/uL


(1.8-7.7)


 


Lymphocytes # (Auto)


 


 0.8 x10^3/uL


(1.0-4.8)


 


Monocytes # (Auto)


 


 0.4 x10^3/uL


(0.0-1.1)


 


Eosinophils # (Auto)


 


 0.0 x10^3/uL


(0.0-0.7)


 


Basophils # (Auto)


 


 0.0 x10^3/uL


(0.0-0.2)


 


Sodium Level


 


 142 mmol/L


(136-145)


 


Potassium Level


 


 4.5 mmol/L


(3.5-5.1)


 


Chloride Level


 


 105 mmol/L


()


 


Carbon Dioxide Level


 


 29 mmol/L


(21-32)


 


Anion Gap  8 (6-14) 


 


Blood Urea Nitrogen


 


 21 mg/dL


(7-20)


 


Creatinine


 


 0.7 mg/dL


(0.6-1.0)


 


Estimated GFR


(Cockcroft-Gault) 


 83.0 





 


Glucose Level


 


 106 mg/dL


(70-99)


 


Calcium Level


 


 7.5 mg/dL


(8.5-10.1)











Objective:


Assessment:


Fever resolved


Leukocytosis,  on steroids 


COVID-19 positive. s/p convalescent plasma and Remdesivir. 


Bilateral pulmonary infiltrate.


Hypoxemia. On BiPAP 


Obesity.


UC yeast





Plan:


Plan of Care


cont supportive care


monitor off antibiotics


Steroids


Convalescent plasma and Remdesivir given


Probiotics 


Maintain aspiration precautions


 


 


d/w BRET SOSA MD           Aug 14, 2020 08:20

## 2020-08-15 VITALS — SYSTOLIC BLOOD PRESSURE: 93 MMHG | DIASTOLIC BLOOD PRESSURE: 51 MMHG

## 2020-08-15 VITALS — SYSTOLIC BLOOD PRESSURE: 93 MMHG | DIASTOLIC BLOOD PRESSURE: 48 MMHG

## 2020-08-15 VITALS — DIASTOLIC BLOOD PRESSURE: 47 MMHG | SYSTOLIC BLOOD PRESSURE: 83 MMHG

## 2020-08-15 VITALS — SYSTOLIC BLOOD PRESSURE: 90 MMHG | DIASTOLIC BLOOD PRESSURE: 49 MMHG

## 2020-08-15 VITALS — SYSTOLIC BLOOD PRESSURE: 96 MMHG | DIASTOLIC BLOOD PRESSURE: 61 MMHG

## 2020-08-15 VITALS — DIASTOLIC BLOOD PRESSURE: 50 MMHG | SYSTOLIC BLOOD PRESSURE: 77 MMHG

## 2020-08-15 VITALS — SYSTOLIC BLOOD PRESSURE: 85 MMHG | DIASTOLIC BLOOD PRESSURE: 55 MMHG

## 2020-08-15 VITALS — SYSTOLIC BLOOD PRESSURE: 97 MMHG | DIASTOLIC BLOOD PRESSURE: 49 MMHG

## 2020-08-15 VITALS — DIASTOLIC BLOOD PRESSURE: 49 MMHG | SYSTOLIC BLOOD PRESSURE: 93 MMHG

## 2020-08-15 VITALS — SYSTOLIC BLOOD PRESSURE: 93 MMHG | DIASTOLIC BLOOD PRESSURE: 57 MMHG

## 2020-08-15 VITALS — SYSTOLIC BLOOD PRESSURE: 86 MMHG | DIASTOLIC BLOOD PRESSURE: 49 MMHG

## 2020-08-15 VITALS — DIASTOLIC BLOOD PRESSURE: 52 MMHG | SYSTOLIC BLOOD PRESSURE: 98 MMHG

## 2020-08-15 VITALS — SYSTOLIC BLOOD PRESSURE: 104 MMHG | DIASTOLIC BLOOD PRESSURE: 53 MMHG

## 2020-08-15 VITALS — DIASTOLIC BLOOD PRESSURE: 46 MMHG | SYSTOLIC BLOOD PRESSURE: 90 MMHG

## 2020-08-15 VITALS — DIASTOLIC BLOOD PRESSURE: 60 MMHG | SYSTOLIC BLOOD PRESSURE: 104 MMHG

## 2020-08-15 VITALS — SYSTOLIC BLOOD PRESSURE: 87 MMHG | DIASTOLIC BLOOD PRESSURE: 62 MMHG

## 2020-08-15 VITALS — DIASTOLIC BLOOD PRESSURE: 56 MMHG | SYSTOLIC BLOOD PRESSURE: 94 MMHG

## 2020-08-15 VITALS — SYSTOLIC BLOOD PRESSURE: 94 MMHG | DIASTOLIC BLOOD PRESSURE: 49 MMHG

## 2020-08-15 VITALS — DIASTOLIC BLOOD PRESSURE: 48 MMHG | SYSTOLIC BLOOD PRESSURE: 99 MMHG

## 2020-08-15 VITALS — SYSTOLIC BLOOD PRESSURE: 100 MMHG | DIASTOLIC BLOOD PRESSURE: 51 MMHG

## 2020-08-15 LAB
ANION GAP SERPL CALC-SCNC: 4 MMOL/L (ref 6–14)
BASOPHILS # BLD AUTO: 0 X10^3/UL (ref 0–0.2)
BASOPHILS NFR BLD: 0 % (ref 0–3)
BUN SERPL-MCNC: 22 MG/DL (ref 7–20)
CALCIUM SERPL-MCNC: 8 MG/DL (ref 8.5–10.1)
CHLORIDE SERPL-SCNC: 107 MMOL/L (ref 98–107)
CO2 SERPL-SCNC: 29 MMOL/L (ref 21–32)
CREAT SERPL-MCNC: 0.7 MG/DL (ref 0.6–1)
EOSINOPHIL NFR BLD: 0.1 X10^3/UL (ref 0–0.7)
EOSINOPHIL NFR BLD: 1 % (ref 0–3)
ERYTHROCYTE [DISTWIDTH] IN BLOOD BY AUTOMATED COUNT: 13.9 % (ref 11.5–14.5)
GFR SERPLBLD BASED ON 1.73 SQ M-ARVRAT: 83 ML/MIN
GLUCOSE SERPL-MCNC: 68 MG/DL (ref 70–99)
HCT VFR BLD CALC: 38.9 % (ref 36–47)
HGB BLD-MCNC: 12.9 G/DL (ref 12–15.5)
LYMPHOCYTES # BLD: 2.3 X10^3/UL (ref 1–4.8)
LYMPHOCYTES NFR BLD AUTO: 19 % (ref 24–48)
MCH RBC QN AUTO: 30 PG (ref 25–35)
MCHC RBC AUTO-ENTMCNC: 33 G/DL (ref 31–37)
MCV RBC AUTO: 90 FL (ref 79–100)
MONO #: 0.9 X10^3/UL (ref 0–1.1)
MONOCYTES NFR BLD: 8 % (ref 0–9)
NEUT #: 8.4 X10^3/UL (ref 1.8–7.7)
NEUTROPHILS NFR BLD AUTO: 72 % (ref 31–73)
PLATELET # BLD AUTO: 273 X10^3/UL (ref 140–400)
POTASSIUM SERPL-SCNC: 4.1 MMOL/L (ref 3.5–5.1)
RBC # BLD AUTO: 4.31 X10^6/UL (ref 3.5–5.4)
SODIUM SERPL-SCNC: 140 MMOL/L (ref 136–145)
WBC # BLD AUTO: 11.7 X10^3/UL (ref 4–11)

## 2020-08-15 RX ADMIN — BENZONATATE SCH MG: 100 CAPSULE, LIQUID FILLED ORAL at 22:36

## 2020-08-15 RX ADMIN — BENZONATATE SCH MG: 100 CAPSULE, LIQUID FILLED ORAL at 06:29

## 2020-08-15 RX ADMIN — Medication SCH CAP: at 08:31

## 2020-08-15 RX ADMIN — ENOXAPARIN SODIUM SCH MG: 40 INJECTION SUBCUTANEOUS at 22:37

## 2020-08-15 RX ADMIN — METHYLPREDNISOLONE SODIUM SUCCINATE SCH MG: 40 INJECTION, POWDER, FOR SOLUTION INTRAMUSCULAR; INTRAVENOUS at 08:31

## 2020-08-15 RX ADMIN — Medication SCH CAP: at 22:36

## 2020-08-15 RX ADMIN — ZINC SULFATE CAP 220 MG (50 MG ELEMENTAL ZN) SCH MG: 220 (50 ZN) CAP at 08:31

## 2020-08-15 RX ADMIN — MULTIPLE VITAMINS W/ MINERALS TAB SCH TAB: TAB at 08:31

## 2020-08-15 RX ADMIN — WATER PRN ML: 1 INJECTION INTRAVENOUS at 12:31

## 2020-08-15 RX ADMIN — ENOXAPARIN SODIUM SCH MG: 40 INJECTION SUBCUTANEOUS at 08:31

## 2020-08-15 RX ADMIN — BENZONATATE SCH MG: 100 CAPSULE, LIQUID FILLED ORAL at 15:16

## 2020-08-15 NOTE — PDOC
PULMONARY PROGRESS NOTES


DATE: 8/15/20 


TIME: 06:57


Subjective


on vapotherm, 40 lpm, fio2 60%


sob better, has occ cough, no pain


continues to clinically improve


Vitals





Vital Signs








  Date Time  Temp Pulse Resp B/P (MAP) Pulse Ox O2 Delivery O2 Flow Rate FiO2


 


8/15/20 06:26  69  97/49 (65) 95 High Flow Nasal Cannula 40.0 


 


8/15/20 04:00 97.6  18     





 97.6       








Comments


on vapotherm


appears comfortable


alert


no paradoxical abd  motion


no audible wheezing


no accessory muscle use


alert


RRR, 


no edema 


No rash


ROS:  No Nausea, No Chest Pain, No Abdominal Pain, No Increase Cough


Labs





Laboratory Tests








Test


 8/13/20


11:34 8/14/20


03:30 8/14/20


08:30 8/15/20


05:30


 


D-Dimer (Gabby)


 1.95 ug/mlFEU


(0.00-0.50) 


 


 





 


White Blood Count


 


 10.8 x10^3/uL


(4.0-11.0) 


 11.7 x10^3/uL


(4.0-11.0)


 


Red Blood Count


 


 4.32 x10^6/uL


(3.50-5.40) 


 4.31 x10^6/uL


(3.50-5.40)


 


Hemoglobin


 


 13.0 g/dL


(12.0-15.5) 


 12.9 g/dL


(12.0-15.5)


 


Hematocrit


 


 38.4 %


(36.0-47.0) 


 38.9 %


(36.0-47.0)


 


Mean Corpuscular Volume  89 fL ()   90 fL () 


 


Mean Corpuscular Hemoglobin  30 pg (25-35)   30 pg (25-35) 


 


Mean Corpuscular Hemoglobin


Concent 


 34 g/dL


(31-37) 


 33 g/dL


(31-37)


 


Red Cell Distribution Width


 


 13.5 %


(11.5-14.5) 


 13.9 %


(11.5-14.5)


 


Platelet Count


 


 297 x10^3/uL


(140-400) 


 273 x10^3/uL


(140-400)


 


Neutrophils (%) (Auto)  89 % (31-73)   72 % (31-73) 


 


Lymphocytes (%) (Auto)  7 % (24-48)   19 % (24-48) 


 


Monocytes (%) (Auto)  4 % (0-9)   8 % (0-9) 


 


Eosinophils (%) (Auto)  0 % (0-3)   1 % (0-3) 


 


Basophils (%) (Auto)  0 % (0-3)   0 % (0-3) 


 


Neutrophils # (Auto)


 


 9.6 x10^3/uL


(1.8-7.7) 


 8.4 x10^3/uL


(1.8-7.7)


 


Lymphocytes # (Auto)


 


 0.8 x10^3/uL


(1.0-4.8) 


 2.3 x10^3/uL


(1.0-4.8)


 


Monocytes # (Auto)


 


 0.4 x10^3/uL


(0.0-1.1) 


 0.9 x10^3/uL


(0.0-1.1)


 


Eosinophils # (Auto)


 


 0.0 x10^3/uL


(0.0-0.7) 


 0.1 x10^3/uL


(0.0-0.7)


 


Basophils # (Auto)


 


 0.0 x10^3/uL


(0.0-0.2) 


 0.0 x10^3/uL


(0.0-0.2)


 


Sodium Level


 


 142 mmol/L


(136-145) 


 140 mmol/L


(136-145)


 


Potassium Level


 


 4.5 mmol/L


(3.5-5.1) 


 4.1 mmol/L


(3.5-5.1)


 


Chloride Level


 


 105 mmol/L


() 


 107 mmol/L


()


 


Carbon Dioxide Level


 


 29 mmol/L


(21-32) 


 29 mmol/L


(21-32)


 


Anion Gap  8 (6-14)   4 (6-14) 


 


Blood Urea Nitrogen


 


 21 mg/dL


(7-20) 


 22 mg/dL


(7-20)


 


Creatinine


 


 0.7 mg/dL


(0.6-1.0) 


 0.7 mg/dL


(0.6-1.0)


 


Estimated GFR


(Cockcroft-Gault) 


 83.0 


 


 83.0 





 


Glucose Level


 


 106 mg/dL


(70-99) 


 68 mg/dL


(70-99)


 


Calcium Level


 


 7.5 mg/dL


(8.5-10.1) 


 8.0 mg/dL


(8.5-10.1)


 


O2 Saturation   92 % (92-99)  


 


Arterial Blood pH


 


 


 7.46


(7.35-7.45) 





 


Arterial Blood pCO2 at


Patient Temp 


 


 31 mmHg


(35-46) 





 


Arterial Blood pO2 at Patient


Temp 


 


 65 mmHg


() 





 


Arterial Blood HCO3


 


 


 22 mmol/L


(21-28) 





 


Arterial Blood Base Excess


 


 


 -1 mmol/L


(-3-3) 





 


FiO2   60%40l  








Laboratory Tests








Test


 8/14/20


08:30 8/15/20


05:30


 


O2 Saturation 92 % (92-99)  


 


Arterial Blood pH


 7.46


(7.35-7.45) 





 


Arterial Blood pCO2 at


Patient Temp 31 mmHg


(35-46) 





 


Arterial Blood pO2 at Patient


Temp 65 mmHg


() 





 


Arterial Blood HCO3


 22 mmol/L


(21-28) 





 


Arterial Blood Base Excess


 -1 mmol/L


(-3-3) 





 


FiO2 60%40l  


 


White Blood Count


 


 11.7 x10^3/uL


(4.0-11.0)


 


Red Blood Count


 


 4.31 x10^6/uL


(3.50-5.40)


 


Hemoglobin


 


 12.9 g/dL


(12.0-15.5)


 


Hematocrit


 


 38.9 %


(36.0-47.0)


 


Mean Corpuscular Volume  90 fL () 


 


Mean Corpuscular Hemoglobin  30 pg (25-35) 


 


Mean Corpuscular Hemoglobin


Concent 


 33 g/dL


(31-37)


 


Red Cell Distribution Width


 


 13.9 %


(11.5-14.5)


 


Platelet Count


 


 273 x10^3/uL


(140-400)


 


Neutrophils (%) (Auto)  72 % (31-73) 


 


Lymphocytes (%) (Auto)  19 % (24-48) 


 


Monocytes (%) (Auto)  8 % (0-9) 


 


Eosinophils (%) (Auto)  1 % (0-3) 


 


Basophils (%) (Auto)  0 % (0-3) 


 


Neutrophils # (Auto)


 


 8.4 x10^3/uL


(1.8-7.7)


 


Lymphocytes # (Auto)


 


 2.3 x10^3/uL


(1.0-4.8)


 


Monocytes # (Auto)


 


 0.9 x10^3/uL


(0.0-1.1)


 


Eosinophils # (Auto)


 


 0.1 x10^3/uL


(0.0-0.7)


 


Basophils # (Auto)


 


 0.0 x10^3/uL


(0.0-0.2)


 


Sodium Level


 


 140 mmol/L


(136-145)


 


Potassium Level


 


 4.1 mmol/L


(3.5-5.1)


 


Chloride Level


 


 107 mmol/L


()


 


Carbon Dioxide Level


 


 29 mmol/L


(21-32)


 


Anion Gap  4 (6-14) 


 


Blood Urea Nitrogen


 


 22 mg/dL


(7-20)


 


Creatinine


 


 0.7 mg/dL


(0.6-1.0)


 


Estimated GFR


(Cockcroft-Gault) 


 83.0 





 


Glucose Level


 


 68 mg/dL


(70-99)


 


Calcium Level


 


 8.0 mg/dL


(8.5-10.1)








Comments


CXR, reviewed 


 


Bilateral pneumonia, more confluent in the right upper lobe.





Impression


.


IMPRESSION:


1.  Acute hypoxic respiratory failure secondary to COVID-19 pneumonia and acute 

lung injury--- improving 


2.  No significant tobacco history.


3.  Abnormal chest x-ray consistent with COVID-19 pneumonia.


4.  Mildly increased liver function tests.


5. hypoxemia-- improving 


6. fever ,resolved





Plan


.


RECOMMENDATIONS:


Continue present vapotherm, on  40 liters and 60% setting reviewed, titrate FiO2

to keep sat 92%.


Monitor the course of treatment.  


solumedrol 40 bid 


Monitor for inflammatory markers,


High dose DVT prophylaxis.


S/P convalescent plasma on 8/5/2020 and remdesivir.


PT/OT -- OOB as tolerated 


start IS





Discussed with RN and RT.











GORAN GONZALEZ MD               Aug 15, 2020 07:00

## 2020-08-15 NOTE — PDOC
Infectious Disease Note


Subjective:


Subjective


Pt continues to feel better


Remains on vapotherm


Denies fever, nausea, vomiting, diarrhea, abdominal pain





Vital Signs:


Vital Signs





Vital Signs








  Date Time  Temp Pulse Resp B/P (MAP) Pulse Ox O2 Delivery O2 Flow Rate FiO2


 


8/15/20 12:31     94 vapotherm 40.0 


 


8/15/20 11:00  80 17 83/47 (59)    


 


8/15/20 08:00 97.6       





 97.6       











Physical Exam:


PHYSICAL EXAM


GENERAL:  Propped up in bed, alert, calm, on vapotherm


HEENT:  PERRL. Oral cavity dry 


NECK:  Supple


LUNGS:  Diminished aeration, no accessory muscle use 


HEART:  S1, S2 regular.


ABDOMEN:  Soft and nontender 


EXTREMITIES:  No edema or cyanosis. SCDs bilaterally 


SKIN:  No signs of rash.


NEUROLOGIC:  Alert and oriented, 


PIV





Medications:


Inpatient Meds:





Current Medications








 Medications


  (Trade)  Dose


 Ordered  Sig/Gaby  Start Time


 Stop Time Status Last Admin


Dose Admin


 


 Acetaminophen


  (Tylenol)  650 mg  PRN Q4HRS  PRN  8/5/20 10:30


    8/9/20 07:59


650 MG


 


 Benzonatate


  (Tessalon Perle)  200 mg  Q8HRS  8/10/20 12:00


    8/15/20 06:29


200 MG


 


 Enoxaparin Sodium


  (Lovenox 40mg


 Syringe)  40 mg  Q12HR  8/5/20 10:30


    8/15/20 08:31


40 MG


 


 Guaifenesin


  (Robitussin Dm)  10 ml  PRN Q6HRS  PRN  8/5/20 10:45


    8/13/20 08:25


10 ML


 


 Lactobacillus


 Rhamnosus


  (Culturelle)  1 cap  BID  8/6/20 21:00


    8/15/20 08:31


1 CAP


 


 Linezolid


  (Zyvox)  600 mg  BID  8/9/20 10:00


 8/12/20 09:22 DC 8/12/20 08:19


600 MG


 


 Methylprednisolone


 Sodium Succinate


  (SOLU-Medrol


 40MG VIAL)  40 mg  DAILY  8/14/20 09:45


    8/15/20 08:31


40 MG


 


 Multivitamins


  (Thera M Plus)  1 tab  DAILY  8/10/20 15:00


    8/15/20 08:31


1 TAB


 


 Non-Formulary


 Medication 1 ea/


 Sodium Chloride  230 ml @ 


 460 mls/hr  Q24H  8/5/20 13:00


 8/8/20 13:29 DC 8/8/20 14:30


460 MLS/HR


 


 Ondansetron HCl


  (Zofran)  4 mg  PRN Q4HRS  PRN  8/5/20 10:30


     





 


 Piperacillin Sod/


 Tazobactam Sod


  (Zosyn Per


 Pharmacy)  1 each  PRN DAILY  PRN  8/5/20 11:00


 8/12/20 09:23 DC  





 


 Piperacillin Sod/


 Tazobactam Sod


 3.375 gm/Sodium


 Chloride  50 ml @ 


 100 mls/hr  Q6HRS  8/5/20 12:00


 8/12/20 09:22 DC 8/12/20 06:04


100 MLS/HR


 


 Potassium


 Chloride/Water  100 ml @ 


 100 mls/hr  Q1H  8/9/20 10:00


 8/9/20 11:59 DC 8/9/20 10:54


100 MLS/HR


 


 Sterile Water


  (WATER for RESP)  1,000 ml  CONT  PRN  8/5/20 16:15


    8/15/20 12:31


1,000 ML


 


 Zinc Sulfate


  (Orazinc)  220 mg  DAILY  8/5/20 11:00


    8/15/20 08:31


220 MG


 


 Zolpidem Tartrate


  (Ambien)  5 mg  PRN QHS  PRN  8/5/20 10:30


     














Labs:


Lab





Laboratory Tests








Test


 8/15/20


05:30


 


White Blood Count


 11.7 x10^3/uL


(4.0-11.0)


 


Red Blood Count


 4.31 x10^6/uL


(3.50-5.40)


 


Hemoglobin


 12.9 g/dL


(12.0-15.5)


 


Hematocrit


 38.9 %


(36.0-47.0)


 


Mean Corpuscular Volume 90 fL () 


 


Mean Corpuscular Hemoglobin 30 pg (25-35) 


 


Mean Corpuscular Hemoglobin


Concent 33 g/dL


(31-37)


 


Red Cell Distribution Width


 13.9 %


(11.5-14.5)


 


Platelet Count


 273 x10^3/uL


(140-400)


 


Neutrophils (%) (Auto) 72 % (31-73) 


 


Lymphocytes (%) (Auto) 19 % (24-48) 


 


Monocytes (%) (Auto) 8 % (0-9) 


 


Eosinophils (%) (Auto) 1 % (0-3) 


 


Basophils (%) (Auto) 0 % (0-3) 


 


Neutrophils # (Auto)


 8.4 x10^3/uL


(1.8-7.7)


 


Lymphocytes # (Auto)


 2.3 x10^3/uL


(1.0-4.8)


 


Monocytes # (Auto)


 0.9 x10^3/uL


(0.0-1.1)


 


Eosinophils # (Auto)


 0.1 x10^3/uL


(0.0-0.7)


 


Basophils # (Auto)


 0.0 x10^3/uL


(0.0-0.2)


 


Sodium Level


 140 mmol/L


(136-145)


 


Potassium Level


 4.1 mmol/L


(3.5-5.1)


 


Chloride Level


 107 mmol/L


()


 


Carbon Dioxide Level


 29 mmol/L


(21-32)


 


Anion Gap 4 (6-14) 


 


Blood Urea Nitrogen


 22 mg/dL


(7-20)


 


Creatinine


 0.7 mg/dL


(0.6-1.0)


 


Estimated GFR


(Cockcroft-Gault) 83.0 





 


Glucose Level


 68 mg/dL


(70-99)


 


Calcium Level


 8.0 mg/dL


(8.5-10.1)











Objective:


Assessment:


Fever resolved


Leukocytosis,  on steroids 


COVID-19 positive. s/p convalescent plasma and Remdesivir. 


Bilateral pulmonary infiltrate.


Hypoxemia. On BiPAP 


Obesity.


UC yeast





Plan:


Plan of Care


cont supportive care


monitor off antibiotics


Steroids


Convalescent plasma and Remdesivir given


Probiotics 


Maintain aspiration precautions


 


 


d/w BRET SOSA MD           Aug 15, 2020 13:09

## 2020-08-15 NOTE — PDOC
[facility] PROGRESS NOTE


Date of Service


DOS:


DATE: 8/15/20 


TIME: 12:21





Chief Complaint


Chief Complaint


A/P:


Acute hypoxic respiratory failuresecondary to SARSCo.2 pneumonia.  Continue 

supportive therapy, steroids room to severe protocol initiated on 8 4 will 

continue here.  Consult ID and pulmonology


SARS- COV2 pneumonia -continue IV Solu-Medrol wean O2 as tolerated I discussed 

with pulmonology to move to a negative pressure room if her desaturations worsen

and she may need BiPAP.. Will try experimental drug to severe and convalescent 

plasma therapies with ID and pulmonology.


Hypokalemiawill replace, check magnesium level


Diarrhealikely COVID-19 related.  Will check for C. difficile per protocol if 

more than 3 bowel movements in a day


Transaminitis also likely related COVID-19, will monitor


Severe protein calorie malnutritionlikely related to above





FEN - General diet


PPX - lovenox


FULL CODE


Dispo - ICU for above


CC time 38 minutes





History of Present Illness


History of Present Illness


08/15/20


Patient seen and examined in ICU


Chart reviewed 


Discussed with RN


Patient continues to improve. Today she was off the bed and sitting comfortably 

in a chair


91% O2 sat








08/14/20


Patient seen and examined in ICU


Chart reviewed


Discussed with RN


Patient improving





08/13/20


Patient seen and examined in ICU


Chart reviewed


Discussed with RN


Patient is improving





08/12/20


Patient seen and examined in ICU


Chart reviewed


Discussed with RN


Patient was smiling and looking much better


For the fist time last night she didn't require Bipap





08/11/20


Patient seen and examined in ICU


Chart reviewed


Discussed with RN


Patient is improving


No cough today


Patient on Vapotherm 





08/10/20


Patient seen and examined in ICU


Discussed with RN


Patient seems to be improving 


She has a bit of a cough


Patient has received treatment with plasma and remdesivir





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 


On 7/30/2020 she began having symptoms of diarrhea and shortness of breath and 

went to the ED to have COVID-19 testing, she was notified of the results on 

7/31/2020 and asked to quarantine at home for 14 days.  Over the course the next

5 days she became progressively more weak had fever and chills and began having 

myalgias and worsening diarrhea with little bit of abdominal pain.  She returned

to the ED on 8/4/2020 at [facility] and was found to be hypoxic.


ABG on 8/4/2020 7.472/30 5.1/94.3 with HCO3 of 26.2 on nonrebreather 10 L labs 

on 8 4 sodium 135, K3.2, chloride 99, CO2 25, BUN 17, creatinine 1, glucose 115,

lactate 1.9, calcium 8, ferritin 1491, bilirubin 0.4, , ALT 5 8, AL K 

phosphatase 62 troponin negative, .6, total protein 6.3, albumin 2.7 

prealbumin 9, lipase 185, B12 995, procalcitonin 0.40, TSH 1.427, d-dimer 1.16, 

WBC 7.1, Hb 14.1, platelets 302 chest x-ray on 8 4 read as worsening 

interstitial and airspace opacities in the right upper lobe and left lung base 

and worsening interstitial opacities in the right lung base compatible with 

multifocal pneumonia


EKG appears normal sinus rhythm with heart rate approximately 85 bpm with normal

axis some inferior T wave changes that are nonspecific no ST segment changes.





8/6: Seen in our ICU on Vapotherm.  She is still complaining of weakness and 

diarrhea to me, 2 BM this morning. Afebrile. Still with cough today. O2 53 on 

Vapotherm. Remdesivir and convalescent FFP given


8/7: O2 63 on ABG this morning after BIPAP overnight. She feels a bit improved, 

appetite improved. No diarrhea yet today. Still weak with myalgias.


8/8:Less short of breath, on BiPAP with FiO2 70%.  No chest pain or wheezing, no

fevers.  Still with some loose stools.  LFTs normal today.  WBC 10.2, Hb 12.7, 

platelets 4 2,  5K3.6 BUN 22, CR 0.6 ABG 7.48, 35, 69 BiPAP 70%.





Febrile 1 1.2 F.  Seen on BiPAP 14/6 with 50% FiO2.  She is very comfortable 

with this.  Has not had a bowel movement a day and a half.  Asking a 70 bedpan 

right now though.  Appetite is decreased.





Plan:


Already on Zosyn, have discussed with ID adding Zyvox would be appropriate.





Vitals/I&O


Vitals/I&O:





                                   Vital Signs








  Date Time  Temp Pulse Resp B/P (MAP) Pulse Ox O2 Delivery O2 Flow Rate FiO2


 


8/15/20 11:00  80 17 83/47 (59) 92 vapotherm 40.0 


 


8/15/20 08:00 97.6       





 97.6       














                                    I & O   


 


 8/14/20 8/14/20 8/15/20





 15:00 23:00 07:00


 


Intake Total 200 ml 300 ml 800 ml


 


Output Total 300 ml 200 ml 250 ml


 


Balance -100 ml 100 ml 550 ml











Physical Exam


Physical Exam:


GENERAL:  Propped up in bed, alert, calm, on vapotherm


HEENT:  PERRL. Oral cavity dry 


NECK:  Supple


LUNGS:  Diminished aeration, no accessory muscle use 


HEART:  S1, S2 regular.


ABDOMEN:  Soft and nontender 


EXTREMITIES:  No edema or cyanosis. SCDs bilaterally 


SKIN:  No signs of rash.


NEUROLOGIC:  Alert and oriented, 


PIV


General:  Alert, Oriented X3, Cooperative, No acute distress


Heart:  Regular rate, Other (Rhonchi bilaterally)


Abdomen:  Normal bowel sounds, Soft, No tenderness, No hepatosplenomegaly, No 

masses


Extremities:  No clubbing, No cyanosis, No edema, Normal pulses, No 

tenderness/swelling


Skin:  No rashes, No breakdown, No significant lesion





Labs


Labs:





Laboratory Tests








Test


 8/15/20


05:30


 


White Blood Count


 11.7 x10^3/uL


(4.0-11.0)


 


Red Blood Count


 4.31 x10^6/uL


(3.50-5.40)


 


Hemoglobin


 12.9 g/dL


(12.0-15.5)


 


Hematocrit


 38.9 %


(36.0-47.0)


 


Mean Corpuscular Volume 90 fL () 


 


Mean Corpuscular Hemoglobin 30 pg (25-35) 


 


Mean Corpuscular Hemoglobin


Concent 33 g/dL


(31-37)


 


Red Cell Distribution Width


 13.9 %


(11.5-14.5)


 


Platelet Count


 273 x10^3/uL


(140-400)


 


Neutrophils (%) (Auto) 72 % (31-73) 


 


Lymphocytes (%) (Auto) 19 % (24-48) 


 


Monocytes (%) (Auto) 8 % (0-9) 


 


Eosinophils (%) (Auto) 1 % (0-3) 


 


Basophils (%) (Auto) 0 % (0-3) 


 


Neutrophils # (Auto)


 8.4 x10^3/uL


(1.8-7.7)


 


Lymphocytes # (Auto)


 2.3 x10^3/uL


(1.0-4.8)


 


Monocytes # (Auto)


 0.9 x10^3/uL


(0.0-1.1)


 


Eosinophils # (Auto)


 0.1 x10^3/uL


(0.0-0.7)


 


Basophils # (Auto)


 0.0 x10^3/uL


(0.0-0.2)


 


Sodium Level


 140 mmol/L


(136-145)


 


Potassium Level


 4.1 mmol/L


(3.5-5.1)


 


Chloride Level


 107 mmol/L


()


 


Carbon Dioxide Level


 29 mmol/L


(21-32)


 


Anion Gap 4 (6-14) 


 


Blood Urea Nitrogen


 22 mg/dL


(7-20)


 


Creatinine


 0.7 mg/dL


(0.6-1.0)


 


Estimated GFR


(Cockcroft-Gault) 83.0 





 


Glucose Level


 68 mg/dL


(70-99)


 


Calcium Level


 8.0 mg/dL


(8.5-10.1)











Assessment and Plan


Assessmemt and Plan


Assessment:


A/P:


Acute hypoxic respiratory failuresecondary to SARSCo.2 pneumonia.  Continue 

supportive therapy, steroids room to severe protocol initiated on 8 4 will 

continue here.  Consult ID and pulmonology


SARS- COV2 pneumonia -continue IV Solu-Medrol wean O2 as tolerated I discussed 

with pulmonology to move to a negative pressure room if her desaturations worsen

and she may need BiPAP.. Will try experimental drug to severe and convalescent 

plasma therapies with ID and pulmonology.


Hypokalemiawill replace, check magnesium level


Diarrhealikely COVID-19 related.  Will check for C. difficile per protocol if 

more than 3 bowel movements in a day


Transaminitis also likely related COVID-19, will monitor


Severe protein calorie malnutritionlikely related to above





Plan:


ICU monitoring


Respiratory isolation


DVT prophylaxis


Continue IV steroids 


Vapotherm


Fill her short term disability paperwork


Appreciate subspecialist input





Comment


Review of Relevant


I have reviewed the following items anum (where applicable) has been applied.





Justicifation of Admission Dx:


Justifications for Admission:


Justification of Admission Dx:  Yes


Respiratory Failure:  Severe Resp Distress











CASTLE,NIAL K III DO           Aug 15, 2020 12:28

## 2020-08-16 VITALS — SYSTOLIC BLOOD PRESSURE: 93 MMHG | DIASTOLIC BLOOD PRESSURE: 62 MMHG

## 2020-08-16 VITALS — SYSTOLIC BLOOD PRESSURE: 83 MMHG | DIASTOLIC BLOOD PRESSURE: 55 MMHG

## 2020-08-16 VITALS — DIASTOLIC BLOOD PRESSURE: 62 MMHG | SYSTOLIC BLOOD PRESSURE: 92 MMHG

## 2020-08-16 VITALS — DIASTOLIC BLOOD PRESSURE: 55 MMHG | SYSTOLIC BLOOD PRESSURE: 94 MMHG

## 2020-08-16 VITALS — DIASTOLIC BLOOD PRESSURE: 57 MMHG | SYSTOLIC BLOOD PRESSURE: 96 MMHG

## 2020-08-16 VITALS — DIASTOLIC BLOOD PRESSURE: 44 MMHG | SYSTOLIC BLOOD PRESSURE: 95 MMHG

## 2020-08-16 VITALS — DIASTOLIC BLOOD PRESSURE: 54 MMHG | SYSTOLIC BLOOD PRESSURE: 100 MMHG

## 2020-08-16 VITALS — DIASTOLIC BLOOD PRESSURE: 51 MMHG | SYSTOLIC BLOOD PRESSURE: 92 MMHG

## 2020-08-16 VITALS — SYSTOLIC BLOOD PRESSURE: 108 MMHG | DIASTOLIC BLOOD PRESSURE: 58 MMHG

## 2020-08-16 VITALS — DIASTOLIC BLOOD PRESSURE: 60 MMHG | SYSTOLIC BLOOD PRESSURE: 90 MMHG

## 2020-08-16 VITALS — SYSTOLIC BLOOD PRESSURE: 92 MMHG | DIASTOLIC BLOOD PRESSURE: 63 MMHG

## 2020-08-16 VITALS — SYSTOLIC BLOOD PRESSURE: 103 MMHG | DIASTOLIC BLOOD PRESSURE: 57 MMHG

## 2020-08-16 VITALS — SYSTOLIC BLOOD PRESSURE: 109 MMHG | DIASTOLIC BLOOD PRESSURE: 49 MMHG

## 2020-08-16 VITALS — SYSTOLIC BLOOD PRESSURE: 96 MMHG | DIASTOLIC BLOOD PRESSURE: 63 MMHG

## 2020-08-16 LAB
ANION GAP SERPL CALC-SCNC: 5 MMOL/L (ref 6–14)
BASOPHILS # BLD AUTO: 0 X10^3/UL (ref 0–0.2)
BASOPHILS NFR BLD: 0 % (ref 0–3)
BUN SERPL-MCNC: 18 MG/DL (ref 7–20)
CALCIUM SERPL-MCNC: 8.6 MG/DL (ref 8.5–10.1)
CHLORIDE SERPL-SCNC: 105 MMOL/L (ref 98–107)
CO2 SERPL-SCNC: 29 MMOL/L (ref 21–32)
CREAT SERPL-MCNC: 0.6 MG/DL (ref 0.6–1)
EOSINOPHIL NFR BLD: 0.1 X10^3/UL (ref 0–0.7)
EOSINOPHIL NFR BLD: 1 % (ref 0–3)
ERYTHROCYTE [DISTWIDTH] IN BLOOD BY AUTOMATED COUNT: 13.8 % (ref 11.5–14.5)
GFR SERPLBLD BASED ON 1.73 SQ M-ARVRAT: 99.1 ML/MIN
GLUCOSE SERPL-MCNC: 107 MG/DL (ref 70–99)
HCT VFR BLD CALC: 43.7 % (ref 36–47)
HGB BLD-MCNC: 14.3 G/DL (ref 12–15.5)
LYMPHOCYTES # BLD: 1.7 X10^3/UL (ref 1–4.8)
LYMPHOCYTES NFR BLD AUTO: 13 % (ref 24–48)
MCH RBC QN AUTO: 30 PG (ref 25–35)
MCHC RBC AUTO-ENTMCNC: 33 G/DL (ref 31–37)
MCV RBC AUTO: 91 FL (ref 79–100)
MONO #: 1 X10^3/UL (ref 0–1.1)
MONOCYTES NFR BLD: 8 % (ref 0–9)
NEUT #: 10.4 X10^3/UL (ref 1.8–7.7)
NEUTROPHILS NFR BLD AUTO: 79 % (ref 31–73)
PLATELET # BLD AUTO: 263 X10^3/UL (ref 140–400)
POTASSIUM SERPL-SCNC: 3.7 MMOL/L (ref 3.5–5.1)
RBC # BLD AUTO: 4.8 X10^6/UL (ref 3.5–5.4)
SODIUM SERPL-SCNC: 139 MMOL/L (ref 136–145)
WBC # BLD AUTO: 13.3 X10^3/UL (ref 4–11)

## 2020-08-16 RX ADMIN — BENZONATATE SCH MG: 100 CAPSULE, LIQUID FILLED ORAL at 06:22

## 2020-08-16 RX ADMIN — NYSTATIN SCH ML: 100000 SUSPENSION ORAL at 20:43

## 2020-08-16 RX ADMIN — ZINC SULFATE CAP 220 MG (50 MG ELEMENTAL ZN) SCH MG: 220 (50 ZN) CAP at 08:16

## 2020-08-16 RX ADMIN — ENOXAPARIN SODIUM SCH MG: 40 INJECTION SUBCUTANEOUS at 20:43

## 2020-08-16 RX ADMIN — METHYLPREDNISOLONE SODIUM SUCCINATE SCH MG: 40 INJECTION, POWDER, FOR SOLUTION INTRAMUSCULAR; INTRAVENOUS at 08:16

## 2020-08-16 RX ADMIN — BENZONATATE SCH MG: 100 CAPSULE, LIQUID FILLED ORAL at 13:56

## 2020-08-16 RX ADMIN — BENZONATATE SCH MG: 100 CAPSULE, LIQUID FILLED ORAL at 20:43

## 2020-08-16 RX ADMIN — Medication SCH CAP: at 20:43

## 2020-08-16 RX ADMIN — NYSTATIN SCH ML: 100000 SUSPENSION ORAL at 17:48

## 2020-08-16 RX ADMIN — MULTIPLE VITAMINS W/ MINERALS TAB SCH TAB: TAB at 08:15

## 2020-08-16 RX ADMIN — Medication SCH CAP: at 08:15

## 2020-08-16 RX ADMIN — NYSTATIN SCH ML: 100000 SUSPENSION ORAL at 12:17

## 2020-08-16 RX ADMIN — ENOXAPARIN SODIUM SCH MG: 40 INJECTION SUBCUTANEOUS at 08:16

## 2020-08-16 NOTE — NUR
pt transferred from ICU to room 670. Received report from DEMETRIA Preciado. Pt transported via 
wheelchair. All belongings left with patient at the time of transfer.

## 2020-08-16 NOTE — PDOC
Infectious Disease Note


Subjective


Subjective


Says feeling better everyday


+ SOA with activity.


Less cough 


Remains on vapotherm, 35L/55%


Mouth is dry 


Denies fever, nausea, vomiting, diarrhea, abdominal pain, dysuria





ROS


ROS


as mentioned above





Vital Sign


Vital Signs





Vital Signs








  Date Time  Temp Pulse Resp B/P (MAP) Pulse Ox O2 Delivery O2 Flow Rate FiO2


 


8/16/20 07:21     65 vapotherm 35.0 


 


8/16/20 06:00  66 16 96/63 (74)    


 


8/16/20 04:00 98.7       





 98.7       











Physical Exam


PHYSICAL EXAM


GENERAL:  Propped up in bed, alert, calm, on vapotherm


HEENT:  PERRL. Oral cavity dry, + thrush  


NECK:  Supple


LUNGS:  Diminished aeration, no accessory muscle use 


HEART:  S1, S2 regular.


ABDOMEN:  Soft and nontender 


EXTREMITIES:  No edema or cyanosis. 


SKIN:  No signs of rash.


NEUROLOGIC:  Alert and oriented x 3 


PIV looks ok





Labs


Micro





         


8/9/20 Blood Culture - Final, Complete


         NO GROWTH AFTER 5 DAYS





8/9 URINE CULTURE  Final  


        Final





        50,000 CFU/ML YEAST on 08/11/20 at 1638


        FINAL ID= [[email or web address] [email or web address]]





Objective


Assessment


Fever resolved


Leukocytosis,  on steroids 


COVID-19 positive. s/p convalescent plasma and Remdesivir. 


Bilateral pulmonary infiltrate.


Hypoxemia. On BiPAP 


Obesity.


UC yeast - CALBS 


Thrush





Plan


Plan of Care


Add nystatin SWSW 


f/u am labs/cxr 


Monitor off antibiotics


Steroids per primary 


Convalescent plasma and Remdesivir given


Probiotics 


Maintain aspiration precautions


Airborne isolation for COVID-19








The patient was seen and  examined at the bedside. The chart was reviewed. The 

case was discussed. Agree with the plan of care.











KARLA QUINN        Aug 16, 2020 08:36


BRET AYALA MD           Aug 16, 2020 14:06

## 2020-08-16 NOTE — PDOC
PULMONARY PROGRESS NOTES


DATE: 8/16/20 


TIME: 11:06


Subjective


on nc 6 lpm


feels better, sob better, has occ cough, no pain


Vitals





Vital Signs








  Date Time  Temp Pulse Resp B/P (MAP) Pulse Ox O2 Delivery O2 Flow Rate FiO2


 


8/16/20 09:00  78 22 100/54 (69) 96 vapotherm 35.0 


 


8/16/20 08:00 98.0       





 98.0       








Comments


on nc 6 lpm


appears comfortable


alert


no paradoxical abd  motion


no audible wheezing


no accessory muscle use


alert


RRR, 


no edema 


No rash


ROS:  No Nausea, No Chest Pain, No Abdominal Pain, No Increase Cough


Labs





Laboratory Tests








Test


 8/15/20


05:30 8/16/20


09:45


 


White Blood Count


 11.7 x10^3/uL


(4.0-11.0) 13.3 x10^3/uL


(4.0-11.0)


 


Red Blood Count


 4.31 x10^6/uL


(3.50-5.40) 4.80 x10^6/uL


(3.50-5.40)


 


Hemoglobin


 12.9 g/dL


(12.0-15.5) 14.3 g/dL


(12.0-15.5)


 


Hematocrit


 38.9 %


(36.0-47.0) 43.7 %


(36.0-47.0)


 


Mean Corpuscular Volume 90 fL ()  91 fL () 


 


Mean Corpuscular Hemoglobin 30 pg (25-35)  30 pg (25-35) 


 


Mean Corpuscular Hemoglobin


Concent 33 g/dL


(31-37) 33 g/dL


(31-37)


 


Red Cell Distribution Width


 13.9 %


(11.5-14.5) 13.8 %


(11.5-14.5)


 


Platelet Count


 273 x10^3/uL


(140-400) 263 x10^3/uL


(140-400)


 


Neutrophils (%) (Auto) 72 % (31-73)  79 % (31-73) 


 


Lymphocytes (%) (Auto) 19 % (24-48)  13 % (24-48) 


 


Monocytes (%) (Auto) 8 % (0-9)  8 % (0-9) 


 


Eosinophils (%) (Auto) 1 % (0-3)  1 % (0-3) 


 


Basophils (%) (Auto) 0 % (0-3)  0 % (0-3) 


 


Neutrophils # (Auto)


 8.4 x10^3/uL


(1.8-7.7) 10.4 x10^3/uL


(1.8-7.7)


 


Lymphocytes # (Auto)


 2.3 x10^3/uL


(1.0-4.8) 1.7 x10^3/uL


(1.0-4.8)


 


Monocytes # (Auto)


 0.9 x10^3/uL


(0.0-1.1) 1.0 x10^3/uL


(0.0-1.1)


 


Eosinophils # (Auto)


 0.1 x10^3/uL


(0.0-0.7) 0.1 x10^3/uL


(0.0-0.7)


 


Basophils # (Auto)


 0.0 x10^3/uL


(0.0-0.2) 0.0 x10^3/uL


(0.0-0.2)


 


Sodium Level


 140 mmol/L


(136-145) 139 mmol/L


(136-145)


 


Potassium Level


 4.1 mmol/L


(3.5-5.1) 3.7 mmol/L


(3.5-5.1)


 


Chloride Level


 107 mmol/L


() 105 mmol/L


()


 


Carbon Dioxide Level


 29 mmol/L


(21-32) 29 mmol/L


(21-32)


 


Anion Gap 4 (6-14)  5 (6-14) 


 


Blood Urea Nitrogen


 22 mg/dL


(7-20) 18 mg/dL


(7-20)


 


Creatinine


 0.7 mg/dL


(0.6-1.0) 0.6 mg/dL


(0.6-1.0)


 


Estimated GFR


(Cockcroft-Gault) 83.0 


 99.1 





 


Glucose Level


 68 mg/dL


(70-99) 107 mg/dL


(70-99)


 


Calcium Level


 8.0 mg/dL


(8.5-10.1) 8.6 mg/dL


(8.5-10.1)








Laboratory Tests








Test


 8/16/20


09:45


 


White Blood Count


 13.3 x10^3/uL


(4.0-11.0)


 


Red Blood Count


 4.80 x10^6/uL


(3.50-5.40)


 


Hemoglobin


 14.3 g/dL


(12.0-15.5)


 


Hematocrit


 43.7 %


(36.0-47.0)


 


Mean Corpuscular Volume 91 fL () 


 


Mean Corpuscular Hemoglobin 30 pg (25-35) 


 


Mean Corpuscular Hemoglobin


Concent 33 g/dL


(31-37)


 


Red Cell Distribution Width


 13.8 %


(11.5-14.5)


 


Platelet Count


 263 x10^3/uL


(140-400)


 


Neutrophils (%) (Auto) 79 % (31-73) 


 


Lymphocytes (%) (Auto) 13 % (24-48) 


 


Monocytes (%) (Auto) 8 % (0-9) 


 


Eosinophils (%) (Auto) 1 % (0-3) 


 


Basophils (%) (Auto) 0 % (0-3) 


 


Neutrophils # (Auto)


 10.4 x10^3/uL


(1.8-7.7)


 


Lymphocytes # (Auto)


 1.7 x10^3/uL


(1.0-4.8)


 


Monocytes # (Auto)


 1.0 x10^3/uL


(0.0-1.1)


 


Eosinophils # (Auto)


 0.1 x10^3/uL


(0.0-0.7)


 


Basophils # (Auto)


 0.0 x10^3/uL


(0.0-0.2)


 


Sodium Level


 139 mmol/L


(136-145)


 


Potassium Level


 3.7 mmol/L


(3.5-5.1)


 


Chloride Level


 105 mmol/L


()


 


Carbon Dioxide Level


 29 mmol/L


(21-32)


 


Anion Gap 5 (6-14) 


 


Blood Urea Nitrogen


 18 mg/dL


(7-20)


 


Creatinine


 0.6 mg/dL


(0.6-1.0)


 


Estimated GFR


(Cockcroft-Gault) 99.1 





 


Glucose Level


 107 mg/dL


(70-99)


 


Calcium Level


 8.6 mg/dL


(8.5-10.1)








Comments


CXR, reviewed 


 


Bilateral pneumonia, more confluent in the right upper lobe.





Impression


.


IMPRESSION:


1.  Acute hypoxic respiratory failure secondary to COVID-19 pneumonia and acute 

lung injury--- improving 


2.  No significant tobacco history.


3.  Abnormal chest x-ray consistent with COVID-19 pneumonia.


4.  Mildly increased liver function tests.


5. hypoxemia-- improving 


6. fever ,resolved





Plan


.


RECOMMENDATIONS:


titrate FiO2 to keep sat 92%. 


Monitor the course of treatment.  


cont solumedrol 


Monitor for inflammatory markers,


High dose DVT prophylaxis.


S/P convalescent plasma on 8/5/2020 and remdesivir.


PT/OT -- OOB as tolerated 


IS


?out of icu





Discussed with RN and RT.











GORAN GONZALEZ MD               Aug 16, 2020 11:07

## 2020-08-17 VITALS — SYSTOLIC BLOOD PRESSURE: 102 MMHG | DIASTOLIC BLOOD PRESSURE: 58 MMHG

## 2020-08-17 VITALS — SYSTOLIC BLOOD PRESSURE: 121 MMHG | DIASTOLIC BLOOD PRESSURE: 64 MMHG

## 2020-08-17 VITALS — SYSTOLIC BLOOD PRESSURE: 95 MMHG | DIASTOLIC BLOOD PRESSURE: 59 MMHG

## 2020-08-17 VITALS — DIASTOLIC BLOOD PRESSURE: 54 MMHG | SYSTOLIC BLOOD PRESSURE: 102 MMHG

## 2020-08-17 VITALS — DIASTOLIC BLOOD PRESSURE: 55 MMHG | SYSTOLIC BLOOD PRESSURE: 101 MMHG

## 2020-08-17 VITALS — SYSTOLIC BLOOD PRESSURE: 98 MMHG | DIASTOLIC BLOOD PRESSURE: 58 MMHG

## 2020-08-17 LAB
ANION GAP SERPL CALC-SCNC: 5 MMOL/L (ref 6–14)
BASOPHILS # BLD AUTO: 0 X10^3/UL (ref 0–0.2)
BASOPHILS NFR BLD: 0 % (ref 0–3)
BUN SERPL-MCNC: 17 MG/DL (ref 7–20)
CALCIUM SERPL-MCNC: 7.8 MG/DL (ref 8.5–10.1)
CHLORIDE SERPL-SCNC: 107 MMOL/L (ref 98–107)
CO2 SERPL-SCNC: 30 MMOL/L (ref 21–32)
CREAT SERPL-MCNC: 0.6 MG/DL (ref 0.6–1)
EOSINOPHIL NFR BLD: 0.1 X10^3/UL (ref 0–0.7)
EOSINOPHIL NFR BLD: 1 % (ref 0–3)
ERYTHROCYTE [DISTWIDTH] IN BLOOD BY AUTOMATED COUNT: 13.9 % (ref 11.5–14.5)
GFR SERPLBLD BASED ON 1.73 SQ M-ARVRAT: 99.1 ML/MIN
GLUCOSE SERPL-MCNC: 61 MG/DL (ref 70–99)
HCT VFR BLD CALC: 40.5 % (ref 36–47)
HGB BLD-MCNC: 13.3 G/DL (ref 12–15.5)
LYMPHOCYTES # BLD: 2.1 X10^3/UL (ref 1–4.8)
LYMPHOCYTES NFR BLD AUTO: 22 % (ref 24–48)
MCH RBC QN AUTO: 30 PG (ref 25–35)
MCHC RBC AUTO-ENTMCNC: 33 G/DL (ref 31–37)
MCV RBC AUTO: 91 FL (ref 79–100)
MONO #: 0.9 X10^3/UL (ref 0–1.1)
MONOCYTES NFR BLD: 10 % (ref 0–9)
NEUT #: 6.6 X10^3/UL (ref 1.8–7.7)
NEUTROPHILS NFR BLD AUTO: 68 % (ref 31–73)
PLATELET # BLD AUTO: 235 X10^3/UL (ref 140–400)
POTASSIUM SERPL-SCNC: 3.8 MMOL/L (ref 3.5–5.1)
RBC # BLD AUTO: 4.44 X10^6/UL (ref 3.5–5.4)
SODIUM SERPL-SCNC: 142 MMOL/L (ref 136–145)
WBC # BLD AUTO: 9.7 X10^3/UL (ref 4–11)

## 2020-08-17 RX ADMIN — Medication SCH CAP: at 21:23

## 2020-08-17 RX ADMIN — ENOXAPARIN SODIUM SCH MG: 40 INJECTION SUBCUTANEOUS at 21:23

## 2020-08-17 RX ADMIN — NYSTATIN SCH ML: 100000 SUSPENSION ORAL at 18:22

## 2020-08-17 RX ADMIN — BENZONATATE SCH MG: 100 CAPSULE, LIQUID FILLED ORAL at 12:47

## 2020-08-17 RX ADMIN — BENZONATATE SCH MG: 100 CAPSULE, LIQUID FILLED ORAL at 21:23

## 2020-08-17 RX ADMIN — Medication SCH CAP: at 08:42

## 2020-08-17 RX ADMIN — NYSTATIN SCH ML: 100000 SUSPENSION ORAL at 13:00

## 2020-08-17 RX ADMIN — METHYLPREDNISOLONE SODIUM SUCCINATE SCH MG: 40 INJECTION, POWDER, FOR SOLUTION INTRAMUSCULAR; INTRAVENOUS at 08:42

## 2020-08-17 RX ADMIN — NYSTATIN SCH ML: 100000 SUSPENSION ORAL at 21:23

## 2020-08-17 RX ADMIN — ENOXAPARIN SODIUM SCH MG: 40 INJECTION SUBCUTANEOUS at 08:43

## 2020-08-17 RX ADMIN — MULTIPLE VITAMINS W/ MINERALS TAB SCH TAB: TAB at 08:42

## 2020-08-17 RX ADMIN — BENZONATATE SCH MG: 100 CAPSULE, LIQUID FILLED ORAL at 06:21

## 2020-08-17 RX ADMIN — NYSTATIN SCH ML: 100000 SUSPENSION ORAL at 08:42

## 2020-08-17 RX ADMIN — ZINC SULFATE CAP 220 MG (50 MG ELEMENTAL ZN) SCH MG: 220 (50 ZN) CAP at 08:42

## 2020-08-17 NOTE — PDOC
Infectious Disease Note


Subjective


Subjective


Says feeling better everyday. Had a shower


+ SOA with activity.- better


Less cough 


on 3 liters


Mouth is dry 


Denies fever, nausea, vomiting, diarrhea, abdominal pain, dysuria





Vital Sign


Vital Signs





Vital Signs








  Date Time  Temp Pulse Resp B/P (MAP) Pulse Ox O2 Delivery O2 Flow Rate FiO2


 


8/17/20 07:14 97.2 74 20 101/55 (70) 94 Nasal Cannula 3.0 





 97.2       











Physical Exam


PHYSICAL EXAM


GENERAL:  Propped up in bed, alert, calm, on 3 L. looks well


HEENT:  PERRL. Oral cavity dry, no thrush  


NECK:  Supple


LUNGS:  Diminished aeration, no accessory muscle use 


HEART:  S1, S2 regular.


ABDOMEN:  Soft and nontender 


EXTREMITIES:  No edema or cyanosis. 


SKIN:  No signs of rash.


NEUROLOGIC:  Alert and oriented x 3 


PIV looks ok





Labs


Lab





Laboratory Tests








Test


 8/17/20


07:30


 


White Blood Count


 9.7 x10^3/uL


(4.0-11.0)


 


Red Blood Count


 4.44 x10^6/uL


(3.50-5.40)


 


Hemoglobin


 13.3 g/dL


(12.0-15.5)


 


Hematocrit


 40.5 %


(36.0-47.0)


 


Mean Corpuscular Volume 91 fL () 


 


Mean Corpuscular Hemoglobin 30 pg (25-35) 


 


Mean Corpuscular Hemoglobin


Concent 33 g/dL


(31-37)


 


Red Cell Distribution Width


 13.9 %


(11.5-14.5)


 


Platelet Count


 235 x10^3/uL


(140-400)


 


Neutrophils (%) (Auto) 68 % (31-73) 


 


Lymphocytes (%) (Auto) 22 % (24-48) 


 


Monocytes (%) (Auto) 10 % (0-9) 


 


Eosinophils (%) (Auto) 1 % (0-3) 


 


Basophils (%) (Auto) 0 % (0-3) 


 


Neutrophils # (Auto)


 6.6 x10^3/uL


(1.8-7.7)


 


Lymphocytes # (Auto)


 2.1 x10^3/uL


(1.0-4.8)


 


Monocytes # (Auto)


 0.9 x10^3/uL


(0.0-1.1)


 


Eosinophils # (Auto)


 0.1 x10^3/uL


(0.0-0.7)


 


Basophils # (Auto)


 0.0 x10^3/uL


(0.0-0.2)


 


Sodium Level


 142 mmol/L


(136-145)


 


Potassium Level


 3.8 mmol/L


(3.5-5.1)


 


Chloride Level


 107 mmol/L


()


 


Carbon Dioxide Level


 30 mmol/L


(21-32)


 


Anion Gap 5 (6-14) 


 


Blood Urea Nitrogen


 17 mg/dL


(7-20)


 


Creatinine


 0.6 mg/dL


(0.6-1.0)


 


Estimated GFR


(Cockcroft-Gault) 99.1 





 


Glucose Level


 61 mg/dL


(70-99)


 


Calcium Level


 7.8 mg/dL


(8.5-10.1)








Micro





Microbiology


8/9/20 Urine Culture - Final, Complete


         


8/9/20 Blood Culture - Final, Complete


         NO GROWTH AFTER 5 DAYS





Objective


Assessment


Fever resolved


Leukocytosis,  on steroids 


COVID-19 positive. s/p convalescent plasma and Remdesivir. 


Bilateral pulmonary infiltrate.


Hypoxemia. On BiPAP 


Obesity.


 yeast - CALBS 


Thrush





Plan


Plan of Care


Contnystatin SWSW through 8/25





ID to sign off











ABELARDO SANTOS MD              Aug 17, 2020 11:08

## 2020-08-17 NOTE — RAD
EXAM: CHEST ONE VIEW.

 

HISTORY: Hypoxia, Covid positive.

 

COMPARISON: 08/05/2020.

 

FINDINGS: A frontal view of the chest is obtained.

 

Previously noted multifocal infiltrates have significantly improved. Mild 

residual airspace opacities are noted in the right upper lobe and 

laterally on the left. A background of diffuse mild interstitial opacities

is also improved. There is no pneumothorax or pleural effusion. The heart 

is not enlarged.

 

IMPRESSION: 

1. Multifocal bilateral infiltrates have improved but not completely 

resolved.

 

Electronically signed by: MALKA Orosco MD (8/17/2020 2:12 PM) 

[id number]

## 2020-08-17 NOTE — PDOC
PULMONARY PROGRESS NOTES


DATE: 8/17/20 


TIME: 11:51


Subjective


on nc 2 lpm


feels better, sob better, has occ cough, no pain


Vitals





Vital Signs








  Date Time  Temp Pulse Resp B/P (MAP) Pulse Ox O2 Delivery O2 Flow Rate FiO2


 


8/17/20 08:00      Nasal Cannula 3.0 


 


8/17/20 07:14 97.2 74 20 101/55 (70) 94   





 97.2       








Comments


on nc 6 lpm


appears comfortable


alert


no paradoxical abd  motion


no audible wheezing


no accessory muscle use


alert


RRR, 


no edema 


No rash


ROS:  No Nausea, No Chest Pain, No Abdominal Pain, No Increase Cough


Lungs:  Clear


Labs





Laboratory Tests








Test


 8/16/20


09:45 8/17/20


07:30


 


White Blood Count


 13.3 x10^3/uL


(4.0-11.0) 9.7 x10^3/uL


(4.0-11.0)


 


Red Blood Count


 4.80 x10^6/uL


(3.50-5.40) 4.44 x10^6/uL


(3.50-5.40)


 


Hemoglobin


 14.3 g/dL


(12.0-15.5) 13.3 g/dL


(12.0-15.5)


 


Hematocrit


 43.7 %


(36.0-47.0) 40.5 %


(36.0-47.0)


 


Mean Corpuscular Volume 91 fL ()  91 fL () 


 


Mean Corpuscular Hemoglobin 30 pg (25-35)  30 pg (25-35) 


 


Mean Corpuscular Hemoglobin


Concent 33 g/dL


(31-37) 33 g/dL


(31-37)


 


Red Cell Distribution Width


 13.8 %


(11.5-14.5) 13.9 %


(11.5-14.5)


 


Platelet Count


 263 x10^3/uL


(140-400) 235 x10^3/uL


(140-400)


 


Neutrophils (%) (Auto) 79 % (31-73)  68 % (31-73) 


 


Lymphocytes (%) (Auto) 13 % (24-48)  22 % (24-48) 


 


Monocytes (%) (Auto) 8 % (0-9)  10 % (0-9) 


 


Eosinophils (%) (Auto) 1 % (0-3)  1 % (0-3) 


 


Basophils (%) (Auto) 0 % (0-3)  0 % (0-3) 


 


Neutrophils # (Auto)


 10.4 x10^3/uL


(1.8-7.7) 6.6 x10^3/uL


(1.8-7.7)


 


Lymphocytes # (Auto)


 1.7 x10^3/uL


(1.0-4.8) 2.1 x10^3/uL


(1.0-4.8)


 


Monocytes # (Auto)


 1.0 x10^3/uL


(0.0-1.1) 0.9 x10^3/uL


(0.0-1.1)


 


Eosinophils # (Auto)


 0.1 x10^3/uL


(0.0-0.7) 0.1 x10^3/uL


(0.0-0.7)


 


Basophils # (Auto)


 0.0 x10^3/uL


(0.0-0.2) 0.0 x10^3/uL


(0.0-0.2)


 


Sodium Level


 139 mmol/L


(136-145) 142 mmol/L


(136-145)


 


Potassium Level


 3.7 mmol/L


(3.5-5.1) 3.8 mmol/L


(3.5-5.1)


 


Chloride Level


 105 mmol/L


() 107 mmol/L


()


 


Carbon Dioxide Level


 29 mmol/L


(21-32) 30 mmol/L


(21-32)


 


Anion Gap 5 (6-14)  5 (6-14) 


 


Blood Urea Nitrogen


 18 mg/dL


(7-20) 17 mg/dL


(7-20)


 


Creatinine


 0.6 mg/dL


(0.6-1.0) 0.6 mg/dL


(0.6-1.0)


 


Estimated GFR


(Cockcroft-Gault) 99.1 


 99.1 





 


Glucose Level


 107 mg/dL


(70-99) 61 mg/dL


(70-99)


 


Calcium Level


 8.6 mg/dL


(8.5-10.1) 7.8 mg/dL


(8.5-10.1)








Laboratory Tests








Test


 8/17/20


07:30


 


White Blood Count


 9.7 x10^3/uL


(4.0-11.0)


 


Red Blood Count


 4.44 x10^6/uL


(3.50-5.40)


 


Hemoglobin


 13.3 g/dL


(12.0-15.5)


 


Hematocrit


 40.5 %


(36.0-47.0)


 


Mean Corpuscular Volume 91 fL () 


 


Mean Corpuscular Hemoglobin 30 pg (25-35) 


 


Mean Corpuscular Hemoglobin


Concent 33 g/dL


(31-37)


 


Red Cell Distribution Width


 13.9 %


(11.5-14.5)


 


Platelet Count


 235 x10^3/uL


(140-400)


 


Neutrophils (%) (Auto) 68 % (31-73) 


 


Lymphocytes (%) (Auto) 22 % (24-48) 


 


Monocytes (%) (Auto) 10 % (0-9) 


 


Eosinophils (%) (Auto) 1 % (0-3) 


 


Basophils (%) (Auto) 0 % (0-3) 


 


Neutrophils # (Auto)


 6.6 x10^3/uL


(1.8-7.7)


 


Lymphocytes # (Auto)


 2.1 x10^3/uL


(1.0-4.8)


 


Monocytes # (Auto)


 0.9 x10^3/uL


(0.0-1.1)


 


Eosinophils # (Auto)


 0.1 x10^3/uL


(0.0-0.7)


 


Basophils # (Auto)


 0.0 x10^3/uL


(0.0-0.2)


 


Sodium Level


 142 mmol/L


(136-145)


 


Potassium Level


 3.8 mmol/L


(3.5-5.1)


 


Chloride Level


 107 mmol/L


()


 


Carbon Dioxide Level


 30 mmol/L


(21-32)


 


Anion Gap 5 (6-14) 


 


Blood Urea Nitrogen


 17 mg/dL


(7-20)


 


Creatinine


 0.6 mg/dL


(0.6-1.0)


 


Estimated GFR


(Cockcroft-Gault) 99.1 





 


Glucose Level


 61 mg/dL


(70-99)


 


Calcium Level


 7.8 mg/dL


(8.5-10.1)








Comments


CXR, reviewed 


 


Bilateral pneumonia, more confluent in the right upper lobe.





Impression


.


IMPRESSION:


1.  Acute hypoxic respiratory failure secondary to COVID-19 pneumonia and acute 

lung injury--- improving 


2.  No significant tobacco history.


3.  Abnormal chest x-ray consistent with COVID-19 pneumonia.


4.  Mildly increased liver function tests.


5. hypoxemia-- improving 


6. fever ,resolved





Plan


.





titrate FiO2 to keep sat 92%. 


Monitor the course of treatment.  


dc solumedrol . change to PO steroid taper


Monitor for inflammatory markers,


High dose DVT prophylaxis.


S/P convalescent plasma on 8/5/2020 and remdesivir.


PT/OT -- OOB as tolerated 


IS


6 min walk


ok with dc home





Discussed with MAURI ROSS MD                 Aug 17, 2020 11:53

## 2020-08-17 NOTE — NUR
PATRICIA following. Spoke with RN and reviewed chart. Pt to discharge home today self-care on oral 
medications and 02. PATRICIA phoned and faxed clinicals to [facility], [phone], [phone] 
(fax) for new 02 setup. SW awaiting results of 6 min walk but blood gas levels were sent. SW 
to follow.

-------------------------------------------------------------------------------

Addendum: 08/17/20 at 1659 by ELMER GOMEZ

-------------------------------------------------------------------------------

6 min walk results obtained but RN stated pt to weak to discharge and that PT/OT have been 
ordered to evaluate. SW requested RN order COVID test as one will be needed if placement is 
needed. Pt's last COVID test was on 8/5/2020 and she tested positive. Pt will need 2 
negatives to go to [facility] per Lillian. 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 Pt currently on 3l 02. PATRICIA to follow.

-------------------------------------------------------------------------------

Addendum: 08/17/20 at 1724 by ELMER GOMEZ

-------------------------------------------------------------------------------

Pt requesting referral to acute rehab per therapy recommendation. PATRICIA phoned and faxed 
referral to [facility] and completed Patient Choice of Vendor form. 

-------------------------------------------------------------------------------

Addendum: 08/18/20 at 1332 by ELMER GOMEZ

-------------------------------------------------------------------------------

SW following. Reviewed chart and discussed with RN. Spoke with Corine from [facility] and they have approved pt clinically for acute rehab. Corine to obtain 
authorization. Pt remains COVID pending and on 3l 02. PATRICIA to continue following. no loss of consciousness, no gait abnormality, no headache, no sensory deficits, and no weakness.

## 2020-08-17 NOTE — PDOC
[employer] PROGRESS NOTE


Date of Service


DOS:


DATE: 8/17/20 


TIME: 13:02





Chief Complaint


Chief Complaint


Acute hypoxic respiratory failure


SARS- COV2 pneumonia 


Hypokalemia


Diarrhea


Transaminitis 


Severe protein calorie malnutrition





History of Present Illness


History of Present Illness


Seen on med telemetry unit after transfer from ICU. On 3L NCO2. CXR reviewed 

with bilateraly multifocal infiltrates, improved from prior, but still present. 

She is very weak, able to walk 6 steps before tiring out, desaturated to require

4L NCO2 with minimal movement. Afebrile.





Plan:


Cont inpatient, likely d/c in next 24-48 hours, acute rehab or SNF may be more 

appropriate given that she lives alone and is deconditioned from her prolonged 

ICU stay.





08/16/2020


Patient seen and examined in the ICU


Patient markedly improved


Sitting comfortably in chair


Off vapotherm, O2 sat 99% on nasal canula


Chart reviewed


Discussed with RN





08/15/20


Patient seen and examined in ICU


Chart reviewed 


Discussed with RN


Patient continues to improve. Today she was off the bed and sitting comfortably 

in a chair


91% O2 sat








08/14/20


Patient seen and examined in ICU


Chart reviewed


Discussed with RN


Patient improving





08/13/20


Patient seen and examined in ICU


Chart reviewed


Discussed with RN


Patient is improving





08/12/20


Patient seen and examined in ICU


Chart reviewed


Discussed with RN


Patient was smiling and looking much better


For the fist time last night she didn't require Bipap





08/11/20


Patient seen and examined in ICU


Chart reviewed


Discussed with RN


Patient is improving


No cough today


Patient on Vapotherm 





08/10/20


Patient seen and examined in ICU


Discussed with RN


Patient seems to be improving 


She has a bit of a cough


Patient has received treatment with plasma and remdesivir





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 


On 7/30/2020 she began having symptoms of diarrhea and shortness of breath and 

went to the ED to have COVID-19 testing, she was notified of the results on 

7/31/2020 and asked to quarantine at home for 14 days.  Over the course the next

5 days she became progressively more weak had fever and chills and began having 

myalgias and worsening diarrhea with little bit of abdominal pain.  She returned

to the ED on 8/4/2020 at [facility] and was found to be hypoxic.


ABG on 8/4/2020 7.472/30 5.1/94.3 with HCO3 of 26.2 on nonrebreather 10 L labs 

on 8 4 sodium 135, K3.2, chloride 99, CO2 25, BUN 17, creatinine 1, glucose 115,

lactate 1.9, calcium 8, ferritin 1491, bilirubin 0.4, , ALT 5 8, AL K 

phosphatase 62 troponin negative, .6, total protein 6.3, albumin 2.7 

prealbumin 9, lipase 185, B12 995, procalcitonin 0.40, TSH 1.427, d-dimer 1.16, 

WBC 7.1, Hb 14.1, platelets 302 chest x-ray on 8 4 read as worsening inter

stitial and airspace opacities in the right upper lobe and left lung base and 

worsening interstitial opacities in the right lung base compatible with 

multifocal pneumonia


EKG appears normal sinus rhythm with heart rate approximately 85 bpm with normal

axis some inferior T wave changes that are nonspecific no ST segment changes.





8/6: Seen in our ICU on Vapotherm.  She is still complaining of weakness and 

diarrhea to me, 2 BM this morning. Afebrile. Still with cough today. O2 53 on 

Vapotherm. Remdesivir and convalescent FFP given


8/7: O2 63 on ABG this morning after BIPAP overnight. She feels a bit improved, 

appetite improved. No diarrhea yet today. Still weak with myalgias.


8/8:Less short of breath, on BiPAP with FiO2 70%.  No chest pain or wheezing, no

fevers.  Still with some loose stools.  LFTs normal today.  WBC 10.2, Hb 12.7, 

platelets 4 2,  5K3.6 BUN 22, CR 0.6 ABG 7.48, 35, 69 BiPAP 70%.


8/9: Febrile 101.2 F.  Seen on BiPAP 14/6 with 50% FiO2.  She is very 

comfortable with this.  Has not had a bowel movement a day and a half.  Asking a

70 bedpan right now though.  Appetite is decreased.





Vitals/I&O


Vitals/I&O:





                                   Vital Signs








  Date Time  Temp Pulse Resp B/P (MAP) Pulse Ox O2 Delivery O2 Flow Rate FiO2


 


8/17/20 11:04 97.5 85 29 98/58 (71) 90 Nasal Cannula 3.0 





 97.5       














                                    I & O   


 


 8/16/20 8/16/20 8/17/20





 15:00 23:00 07:00


 


Intake Total 960 ml 360 ml 400 ml


 


Balance 960 ml 360 ml 400 ml











Physical Exam


Physical Exam:


GENERAL:  Propped up in bed, alert, calm, on 3 L. looks well


HEENT:  PERRL. Oral cavity dry, no thrush  


NECK:  Supple


LUNGS:  Diminished aeration, no accessory muscle use 


HEART:  S1, S2 regular.


ABDOMEN:  Soft and nontender 


EXTREMITIES:  No edema or cyanosis. 


SKIN:  No signs of rash.


NEUROLOGIC:  Alert and oriented x 3 


PIV looks ok


General:  Alert, Oriented X3, Cooperative, No acute distress


Heart:  Regular rate


Lungs:  Clear


Abdomen:  Normal bowel sounds, Soft, No tenderness, No hepatosplenomegaly, No 

masses


Extremities:  No clubbing, No cyanosis, No edema, Normal pulses, No 

tenderness/swelling


Skin:  No rashes, No breakdown, No significant lesion





Labs


Labs:





Laboratory Tests








Test


 8/17/20


07:30


 


White Blood Count


 9.7 x10^3/uL


(4.0-11.0)


 


Red Blood Count


 4.44 x10^6/uL


(3.50-5.40)


 


Hemoglobin


 13.3 g/dL


(12.0-15.5)


 


Hematocrit


 40.5 %


(36.0-47.0)


 


Mean Corpuscular Volume 91 fL () 


 


Mean Corpuscular Hemoglobin 30 pg (25-35) 


 


Mean Corpuscular Hemoglobin


Concent 33 g/dL


(31-37)


 


Red Cell Distribution Width


 13.9 %


(11.5-14.5)


 


Platelet Count


 235 x10^3/uL


(140-400)


 


Neutrophils (%) (Auto) 68 % (31-73) 


 


Lymphocytes (%) (Auto) 22 % (24-48) 


 


Monocytes (%) (Auto) 10 % (0-9) 


 


Eosinophils (%) (Auto) 1 % (0-3) 


 


Basophils (%) (Auto) 0 % (0-3) 


 


Neutrophils # (Auto)


 6.6 x10^3/uL


(1.8-7.7)


 


Lymphocytes # (Auto)


 2.1 x10^3/uL


(1.0-4.8)


 


Monocytes # (Auto)


 0.9 x10^3/uL


(0.0-1.1)


 


Eosinophils # (Auto)


 0.1 x10^3/uL


(0.0-0.7)


 


Basophils # (Auto)


 0.0 x10^3/uL


(0.0-0.2)


 


Sodium Level


 142 mmol/L


(136-145)


 


Potassium Level


 3.8 mmol/L


(3.5-5.1)


 


Chloride Level


 107 mmol/L


()


 


Carbon Dioxide Level


 30 mmol/L


(21-32)


 


Anion Gap 5 (6-14) 


 


Blood Urea Nitrogen


 17 mg/dL


(7-20)


 


Creatinine


 0.6 mg/dL


(0.6-1.0)


 


Estimated GFR


(Cockcroft-Gault) 99.1 





 


Glucose Level


 61 mg/dL


(70-99)


 


Calcium Level


 7.8 mg/dL


(8.5-10.1)











Comment


Review of Relevant


I have reviewed the following items anum (where applicable) has been applied.


Medications:





Current Medications








 Medications


  (Trade)  Dose


 Ordered  Sig/Gaby


 Route


 PRN Reason  Start Time


 Stop Time Status Last Admin


Dose Admin


 


 Prednisone


  (Prednisone)  30 mg  DAILY


 PO


   8/17/20 12:30


    8/17/20 12:47














Justicifation of Admission Dx:


Justifications for Admission:


Justification of Admission Dx:  Yes


Respiratory Failure:  Severe Resp Distress











BLAIR ERICKSON MD        Aug 17, 2020 13:03

## 2020-08-18 VITALS — DIASTOLIC BLOOD PRESSURE: 55 MMHG | SYSTOLIC BLOOD PRESSURE: 128 MMHG

## 2020-08-18 VITALS — DIASTOLIC BLOOD PRESSURE: 53 MMHG | SYSTOLIC BLOOD PRESSURE: 93 MMHG

## 2020-08-18 VITALS — SYSTOLIC BLOOD PRESSURE: 108 MMHG | DIASTOLIC BLOOD PRESSURE: 55 MMHG

## 2020-08-18 VITALS — DIASTOLIC BLOOD PRESSURE: 46 MMHG | SYSTOLIC BLOOD PRESSURE: 92 MMHG

## 2020-08-18 VITALS — DIASTOLIC BLOOD PRESSURE: 62 MMHG | SYSTOLIC BLOOD PRESSURE: 107 MMHG

## 2020-08-18 LAB
ANION GAP SERPL CALC-SCNC: 5 MMOL/L (ref 6–14)
BASOPHILS # BLD AUTO: 0 X10^3/UL (ref 0–0.2)
BASOPHILS NFR BLD: 1 % (ref 0–3)
BUN SERPL-MCNC: 18 MG/DL (ref 7–20)
CALCIUM SERPL-MCNC: 7.9 MG/DL (ref 8.5–10.1)
CHLORIDE SERPL-SCNC: 107 MMOL/L (ref 98–107)
CO2 SERPL-SCNC: 30 MMOL/L (ref 21–32)
CREAT SERPL-MCNC: 0.7 MG/DL (ref 0.6–1)
EOSINOPHIL NFR BLD: 0 % (ref 0–3)
EOSINOPHIL NFR BLD: 0 X10^3/UL (ref 0–0.7)
ERYTHROCYTE [DISTWIDTH] IN BLOOD BY AUTOMATED COUNT: 13.8 % (ref 11.5–14.5)
GFR SERPLBLD BASED ON 1.73 SQ M-ARVRAT: 83 ML/MIN
GLUCOSE SERPL-MCNC: 90 MG/DL (ref 70–99)
HCT VFR BLD CALC: 38.9 % (ref 36–47)
HGB BLD-MCNC: 12.7 G/DL (ref 12–15.5)
LYMPHOCYTES # BLD: 1.2 X10^3/UL (ref 1–4.8)
LYMPHOCYTES NFR BLD AUTO: 15 % (ref 24–48)
MCH RBC QN AUTO: 30 PG (ref 25–35)
MCHC RBC AUTO-ENTMCNC: 33 G/DL (ref 31–37)
MCV RBC AUTO: 91 FL (ref 79–100)
MONO #: 0.8 X10^3/UL (ref 0–1.1)
MONOCYTES NFR BLD: 10 % (ref 0–9)
NEUT #: 6.2 X10^3/UL (ref 1.8–7.7)
NEUTROPHILS NFR BLD AUTO: 75 % (ref 31–73)
PLATELET # BLD AUTO: 215 X10^3/UL (ref 140–400)
POTASSIUM SERPL-SCNC: 4.2 MMOL/L (ref 3.5–5.1)
RBC # BLD AUTO: 4.27 X10^6/UL (ref 3.5–5.4)
SODIUM SERPL-SCNC: 142 MMOL/L (ref 136–145)
WBC # BLD AUTO: 8.3 X10^3/UL (ref 4–11)

## 2020-08-18 RX ADMIN — BENZONATATE SCH MG: 100 CAPSULE, LIQUID FILLED ORAL at 06:21

## 2020-08-18 RX ADMIN — ENOXAPARIN SODIUM SCH MG: 40 INJECTION SUBCUTANEOUS at 22:09

## 2020-08-18 RX ADMIN — NYSTATIN SCH ML: 100000 SUSPENSION ORAL at 12:43

## 2020-08-18 RX ADMIN — MULTIPLE VITAMINS W/ MINERALS TAB SCH TAB: TAB at 09:12

## 2020-08-18 RX ADMIN — ENOXAPARIN SODIUM SCH MG: 40 INJECTION SUBCUTANEOUS at 09:12

## 2020-08-18 RX ADMIN — BENZONATATE SCH MG: 100 CAPSULE, LIQUID FILLED ORAL at 22:07

## 2020-08-18 RX ADMIN — NYSTATIN SCH ML: 100000 SUSPENSION ORAL at 09:12

## 2020-08-18 RX ADMIN — NYSTATIN SCH ML: 100000 SUSPENSION ORAL at 22:07

## 2020-08-18 RX ADMIN — ZINC SULFATE CAP 220 MG (50 MG ELEMENTAL ZN) SCH MG: 220 (50 ZN) CAP at 09:12

## 2020-08-18 RX ADMIN — NYSTATIN SCH ML: 100000 SUSPENSION ORAL at 16:25

## 2020-08-18 RX ADMIN — Medication SCH CAP: at 22:07

## 2020-08-18 RX ADMIN — BENZONATATE SCH MG: 100 CAPSULE, LIQUID FILLED ORAL at 12:43

## 2020-08-18 RX ADMIN — Medication SCH CAP: at 09:12

## 2020-08-18 NOTE — PDOC
[employer] PROGRESS NOTE


Date of Service


DOS:


DATE: 8/18/20 


TIME: 11:03





Chief Complaint


Chief Complaint


Acute hypoxic respiratory failure


SARS- COV2 pneumonia 


Hypokalemia


Diarrhea


Transaminitis 


Severe protein calorie malnutrition





History of Present Illness


History of Present Illness


8/17: Seen on [facility] telemetry unit after transfer from ICU. On 3L NCO2. CXR 

reviewed with bilateraly multifocal infiltrates, improved from prior, but still 

present. She is very weak, able to walk 6 steps before tiring out, desaturated 

to require 4L NCO2 with minimal movement. Afebrile.





Afebrile.  Feels a little stronger today.  On 3 L nasal cannula oxygen. 





Plan:


d/c in next 24-48 hours, acute rehab or SNF may be more appropriate given that 

she lives alone and is deconditioned from her prolonged ICU stay.





08/16/2020


Patient seen and examined in the ICU


Patient markedly improved


Sitting comfortably in chair


Off vapotherm, O2 sat 99% on nasal canula


Chart reviewed


Discussed with RN





08/15/20


Patient seen and examined in ICU


Chart reviewed 


Discussed with RN


Patient continues to improve. Today she was off the bed and sitting comfortably 

in a chair


91% O2 sat








08/14/20


Patient seen and examined in ICU


Chart reviewed


Discussed with RN


Patient improving





08/13/20


Patient seen and examined in ICU


Chart reviewed


Discussed with RN


Patient is improving





08/12/20


Patient seen and examined in ICU


Chart reviewed


Discussed with RN


Patient was smiling and looking much better


For the fist time last night she didn't require Bipap





08/11/20


Patient seen and examined in ICU


Chart reviewed


Discussed with RN


Patient is improving


No cough today


Patient on Vapotherm 





08/10/20


Patient seen and examined in ICU


Discussed with RN


Patient seems to be improving 


She has a bit of a cough


Patient has received treatment with plasma and remdesivir





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 


On 7/30/2020 she began having symptoms of diarrhea and shortness of breath and 

went to the ED to have COVID-19 testing, she was notified of the results on 

7/31/2020 and asked to quarantine at home for 14 days.  Over the course the next

5 days she became progressively more weak had fever and chills and began having 

myalgias and worsening diarrhea with little bit of abdominal pain.  She returned

to the ED on 8/4/2020 at [facility] and was found to be hypoxic.


ABG on 8/4/2020 7.472/30 5.1/94.3 with HCO3 of 26.2 on nonrebreather 10 L labs 

on 8 4 sodium 135, K3.2, chloride 99, CO2 25, BUN 17, creatinine 1, glucose 115,

lactate 1.9, calcium 8, ferritin 1491, bilirubin 0.4, , ALT 5 8, AL K 

phosphatase 62 troponin negative, .6, total protein 6.3, albumin 2.7 

prealbumin 9, lipase 185, B12 995, procalcitonin 0.40, TSH 1.427, d-dimer 1.16, 

WBC 7.1, Hb 14.1, platelets 302 chest x-ray on 8 4 read as worsening 

interstitial and airspace opacities in the right upper lobe and left lung base 

and worsening interstitial opacities in the right lung base compatible with 

multifocal pneumonia


EKG appears normal sinus rhythm with heart rate approximately 85 bpm with normal

axis some inferior T wave changes that are nonspecific no ST segment changes.





8/6: Seen in our ICU on Vapotherm.  She is still complaining of weakness and 

diarrhea to me, 2 BM this morning. Afebrile. Still with cough today. O2 53 on 

Vapotherm. Remdesivir and convalescent FFP given


8/7: O2 63 on ABG this morning after BIPAP overnight. She feels a bit improved, 

appetite improved. No diarrhea yet today. Still weak with myalgias.


8/8:Less short of breath, on BiPAP with FiO2 70%.  No chest pain or wheezing, no

fevers.  Still with some loose stools.  LFTs normal today.  WBC 10.2, Hb 12.7, 

platelets 4 2,  5K3.6 BUN 22, CR 0.6 ABG 7.48, 35, 69 BiPAP 70%.


8/9: Febrile 101.2 F.  Seen on BiPAP 14/6 with 50% FiO2.  She is very 

comfortable with this.  Has not had a bowel movement a day and a half.  Asking a

70 bedpan right now though.  Appetite is decreased.





Vitals/I&O


Vitals/I&O:





                                   Vital Signs








  Date Time  Temp Pulse Resp B/P (MAP) Pulse Ox O2 Delivery O2 Flow Rate FiO2


 


8/18/20 07:14 96.1 65 14 107/62 (77) 93 Nasal Cannula 3.0 





 96.1       














                                    I & O   


 


 8/17/20 8/17/20 8/18/20





 15:00 23:00 07:00


 


Intake Total 120 ml 240 ml 400 ml


 


Balance 120 ml 240 ml 400 ml











Physical Exam


Physical Exam:


GENERAL:  Propped up in bed, alert, calm, on 3 L. looks well


HEENT:  PERRL. Oral cavity dry, no thrush  


NECK:  Supple


LUNGS:  Diminished aeration, no accessory muscle use 


HEART:  S1, S2 regular.


ABDOMEN:  Soft and nontender 


EXTREMITIES:  No edema or cyanosis. 


SKIN:  No signs of rash.


NEUROLOGIC:  Alert and oriented x 3 


PIV looks ok


General:  Alert, Oriented X3, Cooperative, No acute distress


Heart:  Regular rate


Lungs:  Clear


Abdomen:  Normal bowel sounds, Soft, No tenderness, No hepatosplenomegaly, No 

masses


Extremities:  No clubbing, No cyanosis, No edema, Normal pulses, No 

tenderness/swelling


Skin:  No rashes, No breakdown, No significant lesion





Labs


Labs:





Laboratory Tests








Test


 8/18/20


03:50


 


White Blood Count


 8.3 x10^3/uL


(4.0-11.0)


 


Red Blood Count


 4.27 x10^6/uL


(3.50-5.40)


 


Hemoglobin


 12.7 g/dL


(12.0-15.5)


 


Hematocrit


 38.9 %


(36.0-47.0)


 


Mean Corpuscular Volume 91 fL () 


 


Mean Corpuscular Hemoglobin 30 pg (25-35) 


 


Mean Corpuscular Hemoglobin


Concent 33 g/dL


(31-37)


 


Red Cell Distribution Width


 13.8 %


(11.5-14.5)


 


Platelet Count


 215 x10^3/uL


(140-400)


 


Neutrophils (%) (Auto) 75 % (31-73) 


 


Lymphocytes (%) (Auto) 15 % (24-48) 


 


Monocytes (%) (Auto) 10 % (0-9) 


 


Eosinophils (%) (Auto) 0 % (0-3) 


 


Basophils (%) (Auto) 1 % (0-3) 


 


Neutrophils # (Auto)


 6.2 x10^3/uL


(1.8-7.7)


 


Lymphocytes # (Auto)


 1.2 x10^3/uL


(1.0-4.8)


 


Monocytes # (Auto)


 0.8 x10^3/uL


(0.0-1.1)


 


Eosinophils # (Auto)


 0.0 x10^3/uL


(0.0-0.7)


 


Basophils # (Auto)


 0.0 x10^3/uL


(0.0-0.2)


 


Sodium Level


 142 mmol/L


(136-145)


 


Potassium Level


 4.2 mmol/L


(3.5-5.1)


 


Chloride Level


 107 mmol/L


()


 


Carbon Dioxide Level


 30 mmol/L


(21-32)


 


Anion Gap 5 (6-14) 


 


Blood Urea Nitrogen


 18 mg/dL


(7-20)


 


Creatinine


 0.7 mg/dL


(0.6-1.0)


 


Estimated GFR


(Cockcroft-Gault) 83.0 





 


Glucose Level


 90 mg/dL


(70-99)


 


Calcium Level


 7.9 mg/dL


(8.5-10.1)











Comment


Review of Relevant


I have reviewed the following items anum (where applicable) has been applied.


Medications:





Current Medications








 Medications


  (Trade)  Dose


 Ordered  Sig/Gaby


 Route


 PRN Reason  Start Time


 Stop Time Status Last Admin


Dose Admin


 


 Prednisone


  (Prednisone)  30 mg  DAILY


 PO


   8/17/20 12:30


    8/18/20 09:12














Justicifation of Admission Dx:


Justifications for Admission:


Justification of Admission Dx:  Yes


Respiratory Failure:  Severe Resp Distress











BLAIR ERICKSON MD        Aug 18, 2020 11:04

## 2020-08-18 NOTE — NUR
PATRICIA following. Reviewed chart and discussed with RN. Spoke with Corine from [facility] and they have approved pt clinically for acute rehab. Corine to obtain 
authorization. Pt remains COVID pending and on 3l 02. PATRICIA to continue following.

-------------------------------------------------------------------------------

Addendum: 08/18/20 at 1349 by ELMER GOMEZ

-------------------------------------------------------------------------------

RN reported pt would now like to discharge home with HH. Esme from [facility] stated they 
will take pt's insurance. PATRICIA updated Patient Choice of Vendor form. SW awaiting discharge 
orders. Pt has a 6 min walk from yesterday and can discharge with new 02 setup. PATRICIA to 
follow.

-------------------------------------------------------------------------------

Addendum: 08/18/20 at 1406 by ELMER GOMEZ

-------------------------------------------------------------------------------

Pt will discharge tomorrow, 8/19/2020 with HH orders per Dr. Cope. PATRICIA notified Esme 
from [facility]. Pt will need another 6 min walk ordered tomorrow. RN notified. SW to follow.

## 2020-08-19 VITALS — DIASTOLIC BLOOD PRESSURE: 55 MMHG | SYSTOLIC BLOOD PRESSURE: 90 MMHG

## 2020-08-19 VITALS — DIASTOLIC BLOOD PRESSURE: 53 MMHG | SYSTOLIC BLOOD PRESSURE: 106 MMHG

## 2020-08-19 VITALS — DIASTOLIC BLOOD PRESSURE: 48 MMHG | SYSTOLIC BLOOD PRESSURE: 92 MMHG

## 2020-08-19 LAB
ANION GAP SERPL CALC-SCNC: 12 MMOL/L (ref 6–14)
BASOPHILS # BLD AUTO: 0.1 X10^3/UL (ref 0–0.2)
BASOPHILS NFR BLD: 1 % (ref 0–3)
BUN SERPL-MCNC: 18 MG/DL (ref 7–20)
CALCIUM SERPL-MCNC: 8.1 MG/DL (ref 8.5–10.1)
CHLORIDE SERPL-SCNC: 108 MMOL/L (ref 98–107)
CO2 SERPL-SCNC: 23 MMOL/L (ref 21–32)
CREAT SERPL-MCNC: 0.4 MG/DL (ref 0.6–1)
EOSINOPHIL NFR BLD: 0.1 X10^3/UL (ref 0–0.7)
EOSINOPHIL NFR BLD: 1 % (ref 0–3)
ERYTHROCYTE [DISTWIDTH] IN BLOOD BY AUTOMATED COUNT: 14.3 % (ref 11.5–14.5)
GFR SERPLBLD BASED ON 1.73 SQ M-ARVRAT: 158.3 ML/MIN
GLUCOSE SERPL-MCNC: 57 MG/DL (ref 70–99)
HCT VFR BLD CALC: 39.3 % (ref 36–47)
HGB BLD-MCNC: 13 G/DL (ref 12–15.5)
LYMPHOCYTES # BLD: 2.4 X10^3/UL (ref 1–4.8)
LYMPHOCYTES NFR BLD AUTO: 31 % (ref 24–48)
MCH RBC QN AUTO: 30 PG (ref 25–35)
MCHC RBC AUTO-ENTMCNC: 33 G/DL (ref 31–37)
MCV RBC AUTO: 92 FL (ref 79–100)
MONO #: 0.6 X10^3/UL (ref 0–1.1)
MONOCYTES NFR BLD: 8 % (ref 0–9)
NEUT #: 4.5 X10^3/UL (ref 1.8–7.7)
NEUTROPHILS NFR BLD AUTO: 59 % (ref 31–73)
PLATELET # BLD AUTO: 174 X10^3/UL (ref 140–400)
POTASSIUM SERPL-SCNC: 4.3 MMOL/L (ref 3.5–5.1)
RBC # BLD AUTO: 4.27 X10^6/UL (ref 3.5–5.4)
SODIUM SERPL-SCNC: 143 MMOL/L (ref 136–145)
WBC # BLD AUTO: 7.6 X10^3/UL (ref 4–11)

## 2020-08-19 RX ADMIN — ZINC SULFATE CAP 220 MG (50 MG ELEMENTAL ZN) SCH MG: 220 (50 ZN) CAP at 08:44

## 2020-08-19 RX ADMIN — NYSTATIN SCH ML: 100000 SUSPENSION ORAL at 08:44

## 2020-08-19 RX ADMIN — MULTIPLE VITAMINS W/ MINERALS TAB SCH TAB: TAB at 08:44

## 2020-08-19 RX ADMIN — BENZONATATE SCH MG: 100 CAPSULE, LIQUID FILLED ORAL at 06:00

## 2020-08-19 RX ADMIN — ENOXAPARIN SODIUM SCH MG: 40 INJECTION SUBCUTANEOUS at 08:44

## 2020-08-19 RX ADMIN — Medication SCH CAP: at 08:44

## 2020-08-19 NOTE — NUR
Discharge Note:



PATRICIA WAN



Discharge instructions and discharge home medications reviewed with Patient and a copy 
given. All questions have been answered and understanding verbalized. 



The following instructions and handouts were given: F/U with PCP within one week. Oxygen use 
at home given to patient. 



Discontinued lines and drains: Peripheral IV intact.



Patient discharged to Home w/services with Self via Wheelchair

## 2020-08-19 NOTE — PDOC3
Discharge Summary


Visit Information


Date of Admission:  Aug 5, 2020


Date of Discharge:  Aug 19, 2020


Admitting Diagnosis:  COVID 19 pneumonia


Final Diagnosis


COVID 19 pneumonia





Brief Hospital Course


Allergies





                                    Allergies








Coded Allergies Type Severity Reaction Last Updated Verified


 


  No Known Drug Allergies    8/5/20 No








Vital Signs





Vital Signs








  Date Time  Temp Pulse Resp B/P (MAP) Pulse Ox O2 Delivery O2 Flow Rate FiO2


 


8/19/20 11:28 97.0 99 16 92/48 (63) 98 Room Air  





 97.0       


 


8/19/20 08:00       3.0 








Lab Results





Laboratory Tests








Test


 8/17/20


19:45 8/18/20


03:50 8/19/20


05:25


 


Coronavirus (PCR)


 Not detected


(Not Detected) 


 





 


White Blood Count


 


 8.3 x10^3/uL


(4.0-11.0) 7.6 x10^3/uL


(4.0-11.0)


 


Red Blood Count


 


 4.27 x10^6/uL


(3.50-5.40) 4.27 x10^6/uL


(3.50-5.40)


 


Hemoglobin


 


 12.7 g/dL


(12.0-15.5) 13.0 g/dL


(12.0-15.5)


 


Hematocrit


 


 38.9 %


(36.0-47.0) 39.3 %


(36.0-47.0)


 


Mean Corpuscular Volume  91 fL ()  92 fL () 


 


Mean Corpuscular Hemoglobin  30 pg (25-35)  30 pg (25-35) 


 


Mean Corpuscular Hemoglobin


Concent 


 33 g/dL


(31-37) 33 g/dL


(31-37)


 


Red Cell Distribution Width


 


 13.8 %


(11.5-14.5) 14.3 %


(11.5-14.5)


 


Platelet Count


 


 215 x10^3/uL


(140-400) 174 x10^3/uL


(140-400)


 


Neutrophils (%) (Auto)  75 % (31-73)  59 % (31-73) 


 


Lymphocytes (%) (Auto)  15 % (24-48)  31 % (24-48) 


 


Monocytes (%) (Auto)  10 % (0-9)  8 % (0-9) 


 


Eosinophils (%) (Auto)  0 % (0-3)  1 % (0-3) 


 


Basophils (%) (Auto)  1 % (0-3)  1 % (0-3) 


 


Neutrophils # (Auto)


 


 6.2 x10^3/uL


(1.8-7.7) 4.5 x10^3/uL


(1.8-7.7)


 


Lymphocytes # (Auto)


 


 1.2 x10^3/uL


(1.0-4.8) 2.4 x10^3/uL


(1.0-4.8)


 


Monocytes # (Auto)


 


 0.8 x10^3/uL


(0.0-1.1) 0.6 x10^3/uL


(0.0-1.1)


 


Eosinophils # (Auto)


 


 0.0 x10^3/uL


(0.0-0.7) 0.1 x10^3/uL


(0.0-0.7)


 


Basophils # (Auto)


 


 0.0 x10^3/uL


(0.0-0.2) 0.1 x10^3/uL


(0.0-0.2)


 


Sodium Level


 


 142 mmol/L


(136-145) 143 mmol/L


(136-145)


 


Potassium Level


 


 4.2 mmol/L


(3.5-5.1) 4.3 mmol/L


(3.5-5.1)


 


Chloride Level


 


 107 mmol/L


() 108 mmol/L


()


 


Carbon Dioxide Level


 


 30 mmol/L


(21-32) 23 mmol/L


(21-32)


 


Anion Gap  5 (6-14)  12 (6-14) 


 


Blood Urea Nitrogen


 


 18 mg/dL


(7-20) 18 mg/dL


(7-20)


 


Creatinine


 


 0.7 mg/dL


(0.6-1.0) 0.4 mg/dL


(0.6-1.0)


 


Estimated GFR


(Cockcroft-Gault) 


 83.0 


 158.3 





 


Glucose Level


 


 90 mg/dL


(70-99) 57 mg/dL


(70-99)


 


Calcium Level


 


 7.9 mg/dL


(8.5-10.1) 8.1 mg/dL


(8.5-10.1)








Laboratory Tests








Test


 8/19/20


05:25


 


White Blood Count


 7.6 x10^3/uL


(4.0-11.0)


 


Red Blood Count


 4.27 x10^6/uL


(3.50-5.40)


 


Hemoglobin


 13.0 g/dL


(12.0-15.5)


 


Hematocrit


 39.3 %


(36.0-47.0)


 


Mean Corpuscular Volume 92 fL () 


 


Mean Corpuscular Hemoglobin 30 pg (25-35) 


 


Mean Corpuscular Hemoglobin


Concent 33 g/dL


(31-37)


 


Red Cell Distribution Width


 14.3 %


(11.5-14.5)


 


Platelet Count


 174 x10^3/uL


(140-400)


 


Neutrophils (%) (Auto) 59 % (31-73) 


 


Lymphocytes (%) (Auto) 31 % (24-48) 


 


Monocytes (%) (Auto) 8 % (0-9) 


 


Eosinophils (%) (Auto) 1 % (0-3) 


 


Basophils (%) (Auto) 1 % (0-3) 


 


Neutrophils # (Auto)


 4.5 x10^3/uL


(1.8-7.7)


 


Lymphocytes # (Auto)


 2.4 x10^3/uL


(1.0-4.8)


 


Monocytes # (Auto)


 0.6 x10^3/uL


(0.0-1.1)


 


Eosinophils # (Auto)


 0.1 x10^3/uL


(0.0-0.7)


 


Basophils # (Auto)


 0.1 x10^3/uL


(0.0-0.2)


 


Sodium Level


 143 mmol/L


(136-145)


 


Potassium Level


 4.3 mmol/L


(3.5-5.1)


 


Chloride Level


 108 mmol/L


()


 


Carbon Dioxide Level


 23 mmol/L


(21-32)


 


Anion Gap 12 (6-14) 


 


Blood Urea Nitrogen


 18 mg/dL


(7-20)


 


Creatinine


 0.4 mg/dL


(0.6-1.0)


 


Estimated GFR


(Cockcroft-Gault) 158.3 





 


Glucose Level


 57 mg/dL


(70-99)


 


Calcium Level


 8.1 mg/dL


(8.5-10.1)








Brief Hospital Course


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 


On 7/30/2020 she began having symptoms of diarrhea and shortness of breath and 

went to the ED to have COVID-19 testing, she was notified of the results on 

7/31/2020 and asked to quarantine at home for 14 days.  Over the course the next

5 days she became progressively more weak had fever and chills and began having 

myalgias and worsening diarrhea with little bit of abdominal pain.  She returned

to the ED on 8/4/2020 at [facility] and was found to be hypoxic.


ABG on 8/4/2020 7.472/30 5.1/94.3 with HCO3 of 26.2 on nonrebreather 10 L labs 

on 8 4 sodium 135, K3.2, chloride 99, CO2 25, BUN 17, creatinine 1, glucose 115,

lactate 1.9, calcium 8, ferritin 1491, bilirubin 0.4, , ALT 5 8, AL K 

phosphatase 62 troponin negative, .6, total protein 6.3, albumin 2.7 

prealbumin 9, lipase 185, B12 995, procalcitonin 0.40, TSH 1.427, d-dimer 1.16, 

WBC 7.1, Hb 14.1, platelets 302 chest x-ray on 8 4 read as worsening 

interstitial and airspace opacities in the right upper lobe and left lung base 

and worsening interstitial opacities in the right lung base compatible with 

multifocal pneumonia


EKG appears normal sinus rhythm with heart rate approximately 85 bpm with normal

axis some inferior T wave changes that are nonspecific no ST segment changes.





8/6: Seen in our ICU on Vapotherm.  She is still complaining of weakness and 

diarrhea to me, 2 BM this morning. Afebrile. Still with cough today. O2 53 on 

Vapotherm. Remdesivir and convalescent FFP given


8/7: O2 63 on ABG this morning after BIPAP overnight. She feels a bit improved, 

appetite improved. No diarrhea yet today. Still weak with myalgias.


8/8:Less short of breath, on BiPAP with FiO2 70%.  No chest pain or wheezing, no

fevers.  Still with some loose stools.  LFTs normal today.  WBC 10.2, Hb 12.7, 

platelets 4 2,  5K3.6 BUN 22, CR 0.6 ABG 7.48, 35, 69 BiPAP 70%.


8/9: Febrile 101.2 F.  Seen on BiPAP 14/6 with 50% FiO2.  She is very 

comfortable with this.  Has not had a bowel movement a day and a half.  Asking a

70 bedpan right now though.  Appetite is decreased.


8/10: Patient has received treatment with plasma and remdesivir


8/11: Patient on Vapotherm 


8/12-15: For the fist time last night she didn't require Bipap and continues to 

improve


8/16: Transitioned to NCO2 from vapotherm


8/17: Seen on med telemetry unit after transfer from ICU. On 3L NCO2. CXR 

reviewed with bilateraly multifocal infiltrates, improved from prior, but still 

present. She is very weak, able to walk 6 steps before tiring out, desaturated 

to require 4L NCO2 with minimal movement. Afebrile.


8/18: Afebrile.  Feels a little stronger today.  On 3 L nasal cannula oxygen. On

nystatin for thrush, oral steroids





Feeling even stronger today.  Afebrile.  On 2 L nasal cannulated oxygen.  After 

6-minute walk she requires 2 L nasal cannula at rest and up to 8 L/min on 

exertion up to 150 feet.  She is planning on discharge home with home health 

today.





Problem List:





Acute hypoxic respiratory failure


SARS- COV2 pneumonia 


Hypokalemia


Diarrhea


Transaminitis 


Severe protein calorie malnutrition


Thrush





Greater than 30 minutes spent on d/c home with home health





Discharge Information


Condition at Discharge:  Improved


Follow Up:  Weeks


Disposition/Orders:  D/C to Home w/ HH


Scheduled


Nystatin (Nystatin) 100,000 Unit/1 Ml Oral.susp, 5 ML SWSW [id number] for Thrush 

for 10 Days, #200


   Prescribed by: BLAIR ERICKSON MD on 8/19/20 1140


Prednisone (Prednisone **) 10 Mg Tablet, 30 MG PO DAILY for COVID 19 for 5 Days,

#15


   Prescribed by: BLAIR ERICKSON MD on 8/19/20 1140





Justicifation of Admission Dx:


Justifications for Admission:


Justification of Admission Dx:  Yes


Respiratory Failure:  Severe Resp Distress











BLAIR ERICKSON MD        Aug 19, 2020 11:44

## 2020-08-19 NOTE — NUR
SW following. Reviewed chart and discussed with RN. Pt COVID negative. Pt on 2l 02 at rest 
and 6l 02 with exertion. PATRICIA phoned and faxed 6 min walk and orders as well as clinicals to 
Berta at [facility]. PATRICIA also notified Vicente with [facility] of discharge at 1245 so they can do 
home 02 setup. HH orders provided to Esme with [facility]. [facility] transportation department to 
take pt to her car at [facility]. Pt applying for disability. PATRICIA notified 
Thao with [facility] as Dr. Cope signed the paperwork and left it on pt's chart. RN 
notified. No further SW needs at this time.

-------------------------------------------------------------------------------

Addendum: 08/19/20 at 1243 by ELMER GOMEZ

-------------------------------------------------------------------------------

Berta from [facility] authorized tank to be sent with pt for transport home.

-------------------------------------------------------------------------------

Addendum: 08/20/20 at 0942 by ELMER GOMEZ

-------------------------------------------------------------------------------

Pt called to say 02 concentrator was not delivered yesterday. Pt did not realize the phone 
number for [employer] was on the tank. PATRICIA spoke with Vicente who stated the service 
representative called the number of the fs which is not pt's current number. Pt's phone 
number is [phone]. Vicente reported  in route to setup home 02.

## 2020-08-19 NOTE — SNU/HH DC
DISCHARGE WITH HOME HEALTH


DISCHARGE INFORMATION:


Discharge Date:  Aug 19, 2020


Final Diagnosis:


COVID 19 Pneumonia


Condition on Discharge:  Stable





CODE STATUS:


Code Status:  Full





HOME HEALTH:


Face to Face:


I certify this patient is under my care and that I, or a nurse practitioner or 

physician's assistant working with me, had a face to face encounter that meets 

the physician face to face encounter requirements with this patient on 8/19/2020


Medical Complications:  Pneumonia


Skilled Nursing For:  Assess & Educate Safety, Medication Management


RN For Eval/Treatment:  Yes


Physical Therapy For:  Evalulation/Treatment


Occupational Therapy For:  Evaluation/Treatment


Pt Meets Homebound Status:  Extreme weakness w/ amb., Limited distance walking





POST DISCHARGE ORDERS:


Activity Instructions for Disc:  Resume previous activity


Weight Bearing Status after Di:  Full weight bearing


DIET AFTER DISCHARGE:  Regular





TREATMENT/EQUIPMENT ORDERS:


Discharge Respiratory Equipmen:  Oxygen (2L at rest. 8L on exertion)





CERTIFICATION STATEMENT:


Certification Statement:


Certification Statement: Based on the above finding, I certify that this patient

is confined to the home and needs intermittent skilled nursing care, physical 

therapy and/or speech therapy, or continues to need occupational therapy.~ This 

patient is under my care, and I have initiated the establishment of the plan of 

care.~ This patient will be followed by myself or a community physician who will

periodically review the plan of care.











BLAIR ERICKSON MD        Aug 19, 2020 11:37

## 2020-08-19 NOTE — PDOC
PULMONARY PROGRESS NOTES


DATE: 8/19/20 


TIME: 10:43


Subjective


on nc 2 lpm


feeling less weak today, up walking with walker 


feels better, sob better, has occ cough, no pain


Vitals





Vital Signs








  Date Time  Temp Pulse Resp B/P (MAP) Pulse Ox O2 Delivery O2 Flow Rate FiO2


 


8/19/20 07:00 97.8 87 18 106/53 (70) 96 Room Air  





 97.8       


 


8/18/20 23:00       3.0 








Comments


on 3 liters N/C


appears comfortable


alert


no audible wheezing


no accessory muscle use


alert


RRR, 


no edema 


No rash


ROS:  No Nausea, No Chest Pain, No Abdominal Pain, No Increase Cough


Labs





Laboratory Tests








Test


 8/17/20


19:45 8/18/20


03:50 8/19/20


05:25


 


Coronavirus (PCR)


 Not detected


(Not Detected) 


 





 


White Blood Count


 


 8.3 x10^3/uL


(4.0-11.0) 7.6 x10^3/uL


(4.0-11.0)


 


Red Blood Count


 


 4.27 x10^6/uL


(3.50-5.40) 4.27 x10^6/uL


(3.50-5.40)


 


Hemoglobin


 


 12.7 g/dL


(12.0-15.5) 13.0 g/dL


(12.0-15.5)


 


Hematocrit


 


 38.9 %


(36.0-47.0) 39.3 %


(36.0-47.0)


 


Mean Corpuscular Volume  91 fL ()  92 fL () 


 


Mean Corpuscular Hemoglobin  30 pg (25-35)  30 pg (25-35) 


 


Mean Corpuscular Hemoglobin


Concent 


 33 g/dL


(31-37) 33 g/dL


(31-37)


 


Red Cell Distribution Width


 


 13.8 %


(11.5-14.5) 14.3 %


(11.5-14.5)


 


Platelet Count


 


 215 x10^3/uL


(140-400) 174 x10^3/uL


(140-400)


 


Neutrophils (%) (Auto)  75 % (31-73)  59 % (31-73) 


 


Lymphocytes (%) (Auto)  15 % (24-48)  31 % (24-48) 


 


Monocytes (%) (Auto)  10 % (0-9)  8 % (0-9) 


 


Eosinophils (%) (Auto)  0 % (0-3)  1 % (0-3) 


 


Basophils (%) (Auto)  1 % (0-3)  1 % (0-3) 


 


Neutrophils # (Auto)


 


 6.2 x10^3/uL


(1.8-7.7) 4.5 x10^3/uL


(1.8-7.7)


 


Lymphocytes # (Auto)


 


 1.2 x10^3/uL


(1.0-4.8) 2.4 x10^3/uL


(1.0-4.8)


 


Monocytes # (Auto)


 


 0.8 x10^3/uL


(0.0-1.1) 0.6 x10^3/uL


(0.0-1.1)


 


Eosinophils # (Auto)


 


 0.0 x10^3/uL


(0.0-0.7) 0.1 x10^3/uL


(0.0-0.7)


 


Basophils # (Auto)


 


 0.0 x10^3/uL


(0.0-0.2) 0.1 x10^3/uL


(0.0-0.2)


 


Sodium Level


 


 142 mmol/L


(136-145) 143 mmol/L


(136-145)


 


Potassium Level


 


 4.2 mmol/L


(3.5-5.1) 4.3 mmol/L


(3.5-5.1)


 


Chloride Level


 


 107 mmol/L


() 108 mmol/L


()


 


Carbon Dioxide Level


 


 30 mmol/L


(21-32) 23 mmol/L


(21-32)


 


Anion Gap  5 (6-14)  12 (6-14) 


 


Blood Urea Nitrogen


 


 18 mg/dL


(7-20) 18 mg/dL


(7-20)


 


Creatinine


 


 0.7 mg/dL


(0.6-1.0) 0.4 mg/dL


(0.6-1.0)


 


Estimated GFR


(Cockcroft-Gault) 


 83.0 


 158.3 





 


Glucose Level


 


 90 mg/dL


(70-99) 57 mg/dL


(70-99)


 


Calcium Level


 


 7.9 mg/dL


(8.5-10.1) 8.1 mg/dL


(8.5-10.1)








Laboratory Tests








Test


 8/19/20


05:25


 


White Blood Count


 7.6 x10^3/uL


(4.0-11.0)


 


Red Blood Count


 4.27 x10^6/uL


(3.50-5.40)


 


Hemoglobin


 13.0 g/dL


(12.0-15.5)


 


Hematocrit


 39.3 %


(36.0-47.0)


 


Mean Corpuscular Volume 92 fL () 


 


Mean Corpuscular Hemoglobin 30 pg (25-35) 


 


Mean Corpuscular Hemoglobin


Concent 33 g/dL


(31-37)


 


Red Cell Distribution Width


 14.3 %


(11.5-14.5)


 


Platelet Count


 174 x10^3/uL


(140-400)


 


Neutrophils (%) (Auto) 59 % (31-73) 


 


Lymphocytes (%) (Auto) 31 % (24-48) 


 


Monocytes (%) (Auto) 8 % (0-9) 


 


Eosinophils (%) (Auto) 1 % (0-3) 


 


Basophils (%) (Auto) 1 % (0-3) 


 


Neutrophils # (Auto)


 4.5 x10^3/uL


(1.8-7.7)


 


Lymphocytes # (Auto)


 2.4 x10^3/uL


(1.0-4.8)


 


Monocytes # (Auto)


 0.6 x10^3/uL


(0.0-1.1)


 


Eosinophils # (Auto)


 0.1 x10^3/uL


(0.0-0.7)


 


Basophils # (Auto)


 0.1 x10^3/uL


(0.0-0.2)


 


Sodium Level


 143 mmol/L


(136-145)


 


Potassium Level


 4.3 mmol/L


(3.5-5.1)


 


Chloride Level


 108 mmol/L


()


 


Carbon Dioxide Level


 23 mmol/L


(21-32)


 


Anion Gap 12 (6-14) 


 


Blood Urea Nitrogen


 18 mg/dL


(7-20)


 


Creatinine


 0.4 mg/dL


(0.6-1.0)


 


Estimated GFR


(Cockcroft-Gault) 158.3 





 


Glucose Level


 57 mg/dL


(70-99)


 


Calcium Level


 8.1 mg/dL


(8.5-10.1)








Comments


CXR, reviewed 


 


Bilateral pneumonia, more confluent in the right upper lobe.





Impression


.


IMPRESSION:


1.  Acute hypoxic respiratory failure secondary to COVID-19 pneumonia and acute 

lung injury--- improving 


2.  No significant tobacco history.


3.  Abnormal chest x-ray consistent with COVID-19 pneumonia.


4.  Mildly increased liver function tests.


5. hypoxemia-- improving 


6. fever ,resolved





Plan


.


titrate FiO2 to keep sat 92%. 


Monitor the course of treatment.  


dc solumedrol . change to PO steroid taper


DVT prophylaxis.


S/P convalescent plasma on 8/5/2020 and remdesivir.


PT/OT -- OOB as tolerated 


IS at bedside 


6 min walk today








Discussed with RN and RT





OK to D/C home with PT/OT and home oxygen











MAURI GALLAGHER MD                 Aug 19, 2020 10:45

## 2020-08-19 NOTE — PDOC
[facility] [facility] PROGRESS NOTE


Date of Service


DOS:


DATE: 8/19/20 


TIME: 09:51





Chief Complaint


Chief Complaint


Acute hypoxic respiratory failure


SARS- COV2 pneumonia 


Hypokalemia


Diarrhea


Transaminitis 


Severe protein calorie malnutrition





History of Present Illness


History of Present Illness


8/17: Seen on [facility] telemetry unit after transfer from ICU. On 3L NCO2. CXR 

reviewed with bilateraly multifocal infiltrates, improved from prior, but still 

present. She is very weak, able to walk 6 steps before tiring out, desaturated 

to require 4L NCO2 with minimal movement. Afebrile.


8/18: Afebrile.  Feels a little stronger today.  On 3 L nasal cannula oxygen. 





Feeling even stronger today.  Afebrile.  On 2 L nasal cannulated oxygen.  After 

6-minute walk she requires 2 L nasal cannula at rest and up to 8 L/min on 

exertion up to 150 feet.  She is planning on discharge home with home health 

today.





08/16/2020


Patient seen and examined in the ICU


Patient markedly improved


Sitting comfortably in chair


Off vapotherm, O2 sat 99% on nasal canula


Chart reviewed


Discussed with RN





08/15/20


Patient seen and examined in ICU


Chart reviewed 


Discussed with RN


Patient continues to improve. Today she was off the bed and sitting comfortably 

in a chair


91% O2 sat








08/14/20


Patient seen and examined in ICU


Chart reviewed


Discussed with RN


Patient improving





08/13/20


Patient seen and examined in ICU


Chart reviewed


Discussed with RN


Patient is improving





08/12/20


Patient seen and examined in ICU


Chart reviewed


Discussed with RN


Patient was smiling and looking much better


For the fist time last night she didn't require Bipap





08/11/20


Patient seen and examined in ICU


Chart reviewed


Discussed with RN


Patient is improving


No cough today


Patient on Vapotherm 





08/10/20


Patient seen and examined in ICU


Discussed with RN


Patient seems to be improving 


She has a bit of a cough


Patient has received treatment with plasma and remdesivir





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 


On 7/30/2020 she began having symptoms of diarrhea and shortness of breath and 

went to the ED to have COVID-19 testing, she was notified of the results on 

7/31/2020 and asked to quarantine at home for 14 days.  Over the course the next

5 days she became progressively more weak had fever and chills and began having 

myalgias and worsening diarrhea with little bit of abdominal pain.  She returned

to the ED on 8/4/2020 at [facility] and was found to be hypoxic.


ABG on 8/4/2020 7.472/30 5.1/94.3 with HCO3 of 26.2 on nonrebreather 10 L labs 

on 8 4 sodium 135, K3.2, chloride 99, CO2 25, BUN 17, creatinine 1, glucose 115,

lactate 1.9, calcium 8, ferritin 1491, bilirubin 0.4, , ALT 5 8, AL K 

phosphatase 62 troponin negative, .6, total protein 6.3, albumin 2.7 

prealbumin 9, lipase 185, B12 995, procalcitonin 0.40, TSH 1.427, d-dimer 1.16, 

WBC 7.1, Hb 14.1, platelets 302 chest x-ray on 8 4 read as worsening 

interstitial and airspace opacities in the right upper lobe and left lung base 

and worsening interstitial opacities in the right lung base compatible with 

multifocal pneumonia


EKG appears normal sinus rhythm with heart rate approximately 85 bpm with normal

axis some inferior T wave changes that are nonspecific no ST segment changes.





8/6: Seen in our ICU on Vapotherm.  She is still complaining of weakness and 

diarrhea to me, 2 BM this morning. Afebrile. Still with cough today. O2 53 on 

Vapotherm. Remdesivir and convalescent FFP given


8/7: O2 63 on ABG this morning after BIPAP overnight. She feels a bit improved, 

appetite improved. No diarrhea yet today. Still weak with myalgias.


8/8:Less short of breath, on BiPAP with FiO2 70%.  No chest pain or wheezing, no

fevers.  Still with some loose stools.  LFTs normal today.  WBC 10.2, Hb 12.7, 

platelets 4 2,  5K3.6 BUN 22, CR 0.6 ABG 7.48, 35, 69 BiPAP 70%.


8/9: Febrile 101.2 F.  Seen on BiPAP 14/6 with 50% FiO2.  She is very comfor

table with this.  Has not had a bowel movement a day and a half.  Asking a 70 

bedpan right now though.  Appetite is decreased.





Vitals/I&O


Vitals/I&O:





                                   Vital Signs








  Date Time  Temp Pulse Resp B/P (MAP) Pulse Ox O2 Delivery O2 Flow Rate FiO2


 


8/19/20 07:00 97.8 87 18 106/53 (70) 96 Room Air  





 97.8       


 


8/18/20 23:00       3.0 














                                    I & O   


 


 8/18/20 8/18/20 8/19/20





 15:00 23:00 07:00


 


Intake Total 320 ml  


 


Output Total  400 ml 700 ml


 


Balance 320 ml -400 ml -700 ml











Physical Exam


Physical Exam:


GENERAL:  Propped up in bed, alert, calm, on 3 L. looks well


HEENT:  PERRL. Oral cavity dry, no thrush  


NECK:  Supple


LUNGS:  Diminished aeration, no accessory muscle use 


HEART:  S1, S2 regular.


ABDOMEN:  Soft and nontender 


EXTREMITIES:  No edema or cyanosis. 


SKIN:  No signs of rash.


NEUROLOGIC:  Alert and oriented x 3 


PIV looks ok


General:  Alert, Oriented X3, Cooperative, No acute distress


Heart:  Regular rate


Lungs:  Clear


Abdomen:  Normal bowel sounds, Soft, No tenderness, No hepatosplenomegaly, No 

masses


Extremities:  No clubbing, No cyanosis, No edema, Normal pulses, No 

tenderness/swelling


Skin:  No rashes, No breakdown, No significant lesion





Labs


Labs:





Laboratory Tests








Test


 8/19/20


05:25


 


White Blood Count


 7.6 x10^3/uL


(4.0-11.0)


 


Red Blood Count


 4.27 x10^6/uL


(3.50-5.40)


 


Hemoglobin


 13.0 g/dL


(12.0-15.5)


 


Hematocrit


 39.3 %


(36.0-47.0)


 


Mean Corpuscular Volume 92 fL () 


 


Mean Corpuscular Hemoglobin 30 pg (25-35) 


 


Mean Corpuscular Hemoglobin


Concent 33 g/dL


(31-37)


 


Red Cell Distribution Width


 14.3 %


(11.5-14.5)


 


Platelet Count


 174 x10^3/uL


(140-400)


 


Neutrophils (%) (Auto) 59 % (31-73) 


 


Lymphocytes (%) (Auto) 31 % (24-48) 


 


Monocytes (%) (Auto) 8 % (0-9) 


 


Eosinophils (%) (Auto) 1 % (0-3) 


 


Basophils (%) (Auto) 1 % (0-3) 


 


Neutrophils # (Auto)


 4.5 x10^3/uL


(1.8-7.7)


 


Lymphocytes # (Auto)


 2.4 x10^3/uL


(1.0-4.8)


 


Monocytes # (Auto)


 0.6 x10^3/uL


(0.0-1.1)


 


Eosinophils # (Auto)


 0.1 x10^3/uL


(0.0-0.7)


 


Basophils # (Auto)


 0.1 x10^3/uL


(0.0-0.2)


 


Sodium Level


 143 mmol/L


(136-145)


 


Potassium Level


 4.3 mmol/L


(3.5-5.1)


 


Chloride Level


 108 mmol/L


()


 


Carbon Dioxide Level


 23 mmol/L


(21-32)


 


Anion Gap 12 (6-14) 


 


Blood Urea Nitrogen


 18 mg/dL


(7-20)


 


Creatinine


 0.4 mg/dL


(0.6-1.0)


 


Estimated GFR


(Cockcroft-Gault) 158.3 





 


Glucose Level


 57 mg/dL


(70-99)


 


Calcium Level


 8.1 mg/dL


(8.5-10.1)











Comment


Review of Relevant


I have reviewed the following items anum (where applicable) has been applied.





Justicifation of Admission Dx:


Justifications for Admission:


Justification of Admission Dx:  Yes


Respiratory Failure:  Severe Resp Distress











BLAIR ERICKSON MD        Aug 19, 2020 09:51